# Patient Record
Sex: FEMALE | Race: WHITE | Employment: FULL TIME | URBAN - METROPOLITAN AREA
[De-identification: names, ages, dates, MRNs, and addresses within clinical notes are randomized per-mention and may not be internally consistent; named-entity substitution may affect disease eponyms.]

---

## 2017-01-06 ENCOUNTER — TELEPHONE (OUTPATIENT)
Dept: FAMILY MEDICINE CLINIC | Age: 22
End: 2017-01-06

## 2017-01-06 DIAGNOSIS — R11.2 NAUSEA AND VOMITING, INTRACTABILITY OF VOMITING NOT SPECIFIED, UNSPECIFIED VOMITING TYPE: ICD-10-CM

## 2017-01-06 RX ORDER — ONDANSETRON 4 MG/1
4 TABLET, ORALLY DISINTEGRATING ORAL
Qty: 25 TAB | Refills: 0 | Status: SHIPPED | OUTPATIENT
Start: 2017-01-06 | End: 2017-07-11

## 2017-01-06 NOTE — TELEPHONE ENCOUNTER
OK same dose Zofran- 4 mg  #25 up to every 8 hrs   Rx signed and sent to pharmacy per verbal order Dr Av Heath

## 2017-01-06 NOTE — TELEPHONE ENCOUNTER
Mana Borrero  [phone]    Pt is having nausea and vomiting  x a month. She has an appointment with a gastroenterologist on 01/10/17. She is requesting that Carrie Trinh call in something for nausea until she can be seen by the GI Dr.    Pharmacy confirmed.

## 2017-02-28 RX ORDER — DICYCLOMINE HYDROCHLORIDE 10 MG/1
CAPSULE ORAL
Qty: 120 CAP | Refills: 1 | Status: SHIPPED | OUTPATIENT
Start: 2017-02-28 | End: 2017-04-24 | Stop reason: SDUPTHER

## 2017-04-24 RX ORDER — DICYCLOMINE HYDROCHLORIDE 10 MG/1
CAPSULE ORAL
Qty: 120 CAP | Refills: 1 | Status: SHIPPED | OUTPATIENT
Start: 2017-04-24 | End: 2017-06-21 | Stop reason: SDUPTHER

## 2017-06-07 ENCOUNTER — OFFICE VISIT (OUTPATIENT)
Dept: FAMILY MEDICINE CLINIC | Age: 22
End: 2017-06-07

## 2017-06-07 VITALS
TEMPERATURE: 98 F | BODY MASS INDEX: 31.98 KG/M2 | SYSTOLIC BLOOD PRESSURE: 121 MMHG | WEIGHT: 173.8 LBS | HEART RATE: 88 BPM | RESPIRATION RATE: 18 BRPM | HEIGHT: 62 IN | OXYGEN SATURATION: 98 % | DIASTOLIC BLOOD PRESSURE: 84 MMHG

## 2017-06-07 DIAGNOSIS — R63.5 WEIGHT GAIN: ICD-10-CM

## 2017-06-07 DIAGNOSIS — Z00.00 ROUTINE GENERAL MEDICAL EXAMINATION AT A HEALTH CARE FACILITY: Primary | ICD-10-CM

## 2017-06-07 DIAGNOSIS — F31.9 BIPOLAR 1 DISORDER (HCC): ICD-10-CM

## 2017-06-07 DIAGNOSIS — D51.8 OTHER VITAMIN B12 DEFICIENCY ANEMIA: ICD-10-CM

## 2017-06-07 DIAGNOSIS — R35.89 POLYURIA: ICD-10-CM

## 2017-06-07 RX ORDER — HYDROXYZINE 25 MG/1
25 TABLET, FILM COATED ORAL
Qty: 30 TAB | Refills: 0
Start: 2017-06-07 | End: 2017-06-17

## 2017-06-07 NOTE — MR AVS SNAPSHOT
Visit Information Date & Time Provider Department Dept. Phone Encounter #  
 6/7/2017  2:00 PM Laith Fiore, [facility] [phone] [id number] Upcoming Health Maintenance Date Due  
 HPV AGE 9Y-34Y (1 of 3 - Female 3 Dose Series) 1/20/2004 INFLUENZA AGE 9 TO ADULT 8/1/2017 PAP AKA CERVICAL CYTOLOGY 9/2/2018 DTaP/Tdap/Td series (2 - Td) 6/7/2027 Allergies as of 6/7/2017  Review Complete On: 6/7/2017 By: Laith Fiore NP Severity Noted Reaction Type Reactions Coconut  03/10/2012    Rash Pcn [Penicillins]  03/10/2012    Hives Soy  09/09/2014    Hives Current Immunizations  Never Reviewed Name Date Influenza Vaccine 9/24/2016 Not reviewed this visit You Were Diagnosed With   
  
 Codes Comments Routine general medical examination at a health care facility    -  Primary ICD-10-CM: Z00.00 ICD-9-CM: V70.0 Polyuria     ICD-10-CM: R35.8 ICD-9-CM: 788.42 Weight gain     ICD-10-CM: R63.5 ICD-9-CM: 783.1 Bipolar 1 disorder (HCC)     ICD-10-CM: F31.9 ICD-9-CM: 296.7 Other vitamin B12 deficiency anemia     ICD-10-CM: D51.8 ICD-9-CM: 362. 1 Vitals BP Pulse Temp Resp Height(growth percentile) Weight(growth percentile) 121/84 (BP 1 Location: Left arm, BP Patient Position: Sitting) (!) 107 98 °F (36.7 °C) (Oral) 18 5' 2\" (1.575 m) 173 lb 12.8 oz (78.8 kg) LMP SpO2 BMI OB Status Smoking Status 06/04/2017 (Exact Date) 98% 31.79 kg/m2 Having regular periods Never Smoker BMI and BSA Data Body Mass Index Body Surface Area 31.79 kg/m 2 1.86 m 2 [facility]erred [facility]acy [facility] [facility] [phone] Your Updated Medication List  
  
   
This list is accurate as of: 6/7/17  2:32 PM.  Always use your most recent med list. ALLEGRA 180 mg tablet Generic drug:  fexofenadine Take  by mouth. CYMBALTA 60 mg capsule Generic drug:  DULoxetine Take 60 mg by mouth two (2) times a day. dicyclomine 10 mg capsule Commonly known as:  BENTYL TAKE 1 CAP BY MOUTH FOUR (4) TIMES DAILY. hydrOXYzine HCl 25 mg tablet Commonly known as:  ATARAX Take 1 Tab by mouth three (3) times daily as needed for Itching for up to 10 days. ibuprofen 100 mg tablet Take 100 mg by mouth every six (6) hours as needed. JUNEL FE 1/20 (28) 1 mg-20 mcg (21)/75 mg (7) Tab Generic drug:  norethindrone-ethinyl estradiol LATUDA 120 mg Tab tablet Generic drug:  lurasidone TAKE 1 TABLET BY MOUTH ONCE A DAY  
  
 lithium carbonate 300 mg capsule TAKE 3 CAPSULES BY MOUTH EVERY MORNING AND 3 CAPSULES EVERY NIGHT AT BEDTIME  
  
 ondansetron 4 mg disintegrating tablet Commonly known as:  ZOFRAN ODT Take 1 Tab by mouth every eight (8) hours as needed for Nausea. topiramate 50 mg tablet Commonly known as:  TOPAMAX TAKE 1 TABLET BY MOUTH IN THE MORNING AND TAKE 2 TABLETS BY MOUTH IN THE EVENING  
  
 VITAMIN D3 1,000 unit tablet Generic drug:  cholecalciferol Take  by mouth daily. ZANTAC 75 75 mg tablet Generic drug:  raNITIdine Take 75 mg by mouth daily. We Performed the Following CBC W/O DIFF [81468 CPT(R)] HEMOGLOBIN A1C WITH EAG [50720 CPT(R)] LIPID PANEL [03461 CPT(R)] LITHIUM [id number] CPT(R)] METABOLIC PANEL, COMPREHENSIVE [21643 CPT(R)] PROLACTIN [32865 CPT(R)] THYROID CASCADE PROFILE [TSU02539 Custom] VITAMIN B12 & FOLATE [22662 CPT(R)] VITAMIN D, 25 HYDROXY [id number] CPT(R)] Introducing [facility] & HEALTH SERVICES! [facility] introduces [employer] patient portal. Now you can access parts of your medical record, email your doctor's office, and request medication refills online. 1. In your internet browser, go to [email or web address] 2. Click on the First Time User? Click Here link in the Sign In box. You will see the New Member Sign Up page. 3. Enter your [employer] Access Code exactly as it appears below. You will not need to use this code after youve completed the sign-up process. If you do not sign up before the expiration date, you must request a new code. · [employer] Access Code: [email or web address] Expires: 9/5/2017  2:29 PM 
 
4. Enter the last four digits of your Social Security Number (xxxx) and Date of Birth (mm/dd/yyyy) as indicated and click Submit. You will be taken to the next sign-up page. 5. Create a [employer] ID. This will be your [employer] login ID and cannot be changed, so think of one that is secure and easy to remember. 6. Create a [employer] password. You can change your password at any time. 7. Enter your Password Reset Question and Answer. This can be used at a later time if you forget your password. 8. Enter your e-mail address. You will receive e-mail notification when new information is available in [address] 9. Click Sign Up. You can now view and download portions of your medical record. 10. Click the Download Summary menu link to download a portable copy of your medical information. If you have questions, please visit the Frequently Asked Questions section of the [employer] website. Remember, [employer] is NOT to be used for urgent needs. For medical emergencies, dial 911. Now available from your iPhone and Android! Please provide this summary of care documentation to your next provider. Your primary care clinician is listed as Mabel SCHNEIDER. If you have any questions after today's visit, please call [phone].

## 2017-06-07 NOTE — PROGRESS NOTES
Chief Complaint   Patient presents with    Complete Physical     annual exam, Patient has increased urination and drinking     1. Have you been to the ER, urgent care clinic since your last visit? Hospitalized since your last visit? No    2. Have you seen or consulted any other health care providers outside of the [address] [place] [address] since your last visit? Include any pap smears or colon screening. No     Learning Assessment 9/9/2015   PRIMARY LEARNER Patient   PRIMARY LANGUAGE ENGLISH   LEARNER PREFERENCE PRIMARY OTHER (COMMENT)   ANSWERED BY patient   RELATIONSHIP SELF       Abuse Screening Questionnaire 6/7/2017   Do you ever feel afraid of your partner? N   Are you in a relationship with someone who physically or mentally threatens you? N   Is it safe for you to go home?  Raulito Fritz

## 2017-06-07 NOTE — PROGRESS NOTES
Subjective:   23 y.o. female for Well Woman Check. Her gyne and breast care is done elsewhere by her Ob-Gyne physician. Patient Active Problem List    Diagnosis Date Noted    Iron deficiency anemia 03/31/2016    Bipolar 1 disorder ([address] 03/31/2016    Low serum vitamin D 12/07/2015     Current Outpatient Prescriptions   Medication Sig Dispense Refill    hydrOXYzine HCl (ATARAX) 25 mg tablet Take 1 Tab by mouth three (3) times daily as needed for Itching for up to 10 days. 30 Tab 0    ondansetron (ZOFRAN ODT) 4 mg disintegrating tablet Take 1 Tab by mouth every eight (8) hours as needed for Nausea. 25 Tab 0    lithium carbonate 300 mg capsule TAKE 3 CAPSULES BY MOUTH EVERY MORNING AND 3 CAPSULES EVERY NIGHT AT BEDTIME  0    topiramate (TOPAMAX) 50 mg tablet TAKE 1 TABLET BY MOUTH IN THE MORNING AND TAKE 2 TABLETS BY MOUTH IN THE EVENING  0    LATUDA 120 mg tab tablet TAKE 1 TABLET BY MOUTH ONCE A DAY  0    ranitidine (ZANTAC 75) 75 mg tablet Take 75 mg by mouth daily.  JUNEL FE 1/20, 28, 1 mg-20 mcg (21)/75 mg (7) per tablet       DULoxetine (CYMBALTA) 60 mg capsule Take 60 mg by mouth two (2) times a day.  fexofenadine (ALLEGRA) 180 mg tablet Take  by mouth.  ibuprofen 100 mg tablet Take 100 mg by mouth every six (6) hours as needed.  cholecalciferol, vitamin d3, (VITAMIN D) 1,000 unit tablet Take  by mouth daily.  dicyclomine (BENTYL) 10 mg capsule TAKE 1 CAP BY MOUTH FOUR (4) TIMES DAILY. 120 Cap 1     Allergies   Allergen Reactions    Coconut Rash    Pcn [Penicillins] Hives    Soy Hives     Past Medical History:   Diagnosis Date    Anemia     Anxiety     Arm fracture     Bipolar 1 disorder (HCC)     Depression     Fibromyalgia     IBS (irritable bowel syndrome)      History reviewed. No pertinent surgical history.   Family History   Problem Relation Age of Onset    Depression Mother     Depression Father     Heart Disease Maternal Grandmother     Depression Maternal Grandmother     Cancer Maternal Grandfather      brain cancer    Diabetes Maternal Grandfather     Hypertension Maternal Grandfather     Stroke Maternal Grandfather     Cancer Paternal Grandfather      Social History   Substance Use Topics    Smoking status: Never Smoker    Smokeless tobacco: Never Used    Alcohol use Yes      Comment: occ             ROS: Feeling generally well. No TIA's or unusual headaches, no dysphagia. No prolonged cough. No dyspnea or chest pain on exertion. No abdominal pain, change in bowel habits, black or bloody stools. No urinary tract symptoms. No new or unusual musculoskeletal symptoms. Specific concerns today: She reports being thirsty all the time and uses the bathroom a lot  Her psychiatrist requesting lab done also    Objective: The patient appears well, alert, oriented x 3, in no distress. Visit Vitals    /84 (BP 1 Location: Left arm, BP Patient Position: Sitting)    Pulse 88    Temp 98 °F (36.7 °C) (Oral)    Resp 18    Ht 5' 2\" (1.575 m)    Wt 173 lb 12.8 oz (78.8 kg)    LMP 06/04/2017 (Exact Date)    SpO2 98%    BMI 31.79 kg/m2     ENT normal.  Neck supple. No adenopathy or thyromegaly. MALU. Lungs are clear, good air entry, no wheezes, rhonchi or rales. S1 and S2 normal, no murmurs, regular rate and rhythm. Abdomen soft without tenderness, guarding, mass or organomegaly. Extremities show no edema, normal peripheral pulses. Neurological is normal, no focal findings. Breast and Pelvic exams are deferred. Assessment/Plan:   Well Woman  lose weight, increase physical activity, follow low fat diet, routine labs ordered    ICD-10-CM ICD-9-CM    1. Routine general medical examination at a health care facility Z00.00 V70.0 CBC W/O DIFF      LIPID PANEL      METABOLIC PANEL, COMPREHENSIVE      VITAMIN D, 25 HYDROXY      THYROID CASCADE PROFILE      CANCELED: TSH 3RD GENERATION   2.  Polyuria R35.8 788.42 HEMOGLOBIN A1C WITH EAG 3. Weight gain R63.5 783.1 THYROID CASCADE PROFILE   4.  Bipolar 1 disorder (HCC) F31.9 296.7 PROLACTIN      LITHIUM   5. Other vitamin B12 deficiency anemia D51.8 281.1 VITAMIN B12 & FOLATE   await labs  Pt was given an after visit summary which includes diagnosis, current medicines and vital and voiced understanding of treatment plan

## 2017-06-10 LAB
25(OH)D3+25(OH)D2 SERPL-MCNC: 15.8 NG/ML (ref 30–100)
ALBUMIN SERPL-MCNC: 4.4 G/DL (ref 3.5–5.5)
ALBUMIN/GLOB SERPL: 1.6 {RATIO} (ref 1.2–2.2)
ALP SERPL-CCNC: 81 IU/L (ref 39–117)
ALT SERPL-CCNC: 15 IU/L (ref 0–32)
AST SERPL-CCNC: 16 IU/L (ref 0–40)
BILIRUB SERPL-MCNC: 0.2 MG/DL (ref 0–1.2)
BUN SERPL-MCNC: 11 MG/DL (ref 6–20)
BUN/CREAT SERPL: 15 (ref 9–23)
CALCIUM SERPL-MCNC: 9.4 MG/DL (ref 8.7–10.2)
CHLORIDE SERPL-SCNC: 105 MMOL/L (ref 96–106)
CHOLEST SERPL-MCNC: 202 MG/DL (ref 100–199)
CO2 SERPL-SCNC: 18 MMOL/L (ref 18–29)
CREAT SERPL-MCNC: 0.75 MG/DL (ref 0.57–1)
ERYTHROCYTE [DISTWIDTH] IN BLOOD BY AUTOMATED COUNT: 13.8 % (ref 12.3–15.4)
EST. AVERAGE GLUCOSE BLD GHB EST-MCNC: 88 MG/DL
FOLATE SERPL-MCNC: 5.8 NG/ML
GLOBULIN SER CALC-MCNC: 2.7 G/DL (ref 1.5–4.5)
GLUCOSE SERPL-MCNC: 92 MG/DL (ref 65–99)
HBA1C MFR BLD: 4.7 % (ref 4.8–5.6)
HCT VFR BLD AUTO: 40.1 % (ref 34–46.6)
HDLC SERPL-MCNC: 51 MG/DL
HGB BLD-MCNC: 12.7 G/DL (ref 11.1–15.9)
INTERPRETATION, 910389: NORMAL
INTERPRETIVE COMMENT, 330017: NORMAL
LDLC SERPL CALC-MCNC: 125 MG/DL (ref 0–99)
LITHIUM SERPL-SCNC: 0.7 MMOL/L (ref 0.6–1.2)
MCH RBC QN AUTO: 27 PG (ref 26.6–33)
MCHC RBC AUTO-ENTMCNC: 31.7 G/DL (ref 31.5–35.7)
MCV RBC AUTO: 85 FL (ref 79–97)
PLATELET # BLD AUTO: 357 X10E3/UL (ref 150–379)
POTASSIUM SERPL-SCNC: 4.3 MMOL/L (ref 3.5–5.2)
PROLACTIN SERPL-MCNC: 30.5 NG/ML (ref 4.8–23.3)
PROT SERPL-MCNC: 7.1 G/DL (ref 6–8.5)
RBC # BLD AUTO: 4.7 X10E6/UL (ref 3.77–5.28)
SODIUM SERPL-SCNC: 140 MMOL/L (ref 134–144)
T4 FREE SERPL-MCNC: 0.93 NG/DL (ref 0.82–1.77)
THYROPEROXIDASE AB SERPL-ACNC: 7 IU/ML (ref 0–34)
TRIGL SERPL-MCNC: 129 MG/DL (ref 0–149)
TSH SERPL DL<=0.005 MIU/L-ACNC: 5.43 UIU/ML (ref 0.45–4.5)
VIT B12 SERPL-MCNC: 328 PG/ML (ref 211–946)
VLDLC SERPL CALC-MCNC: 26 MG/DL (ref 5–40)
WBC # BLD AUTO: 9.3 X10E3/UL (ref 3.4–10.8)

## 2017-06-21 RX ORDER — DICYCLOMINE HYDROCHLORIDE 10 MG/1
CAPSULE ORAL
Qty: 120 CAP | Refills: 1 | Status: SHIPPED | OUTPATIENT
Start: 2017-06-21 | End: 2017-08-22 | Stop reason: SDUPTHER

## 2017-07-11 ENCOUNTER — OFFICE VISIT (OUTPATIENT)
Dept: FAMILY MEDICINE CLINIC | Age: 22
End: 2017-07-11

## 2017-07-11 VITALS
OXYGEN SATURATION: 98 % | HEIGHT: 62 IN | TEMPERATURE: 98.4 F | RESPIRATION RATE: 18 BRPM | SYSTOLIC BLOOD PRESSURE: 132 MMHG | HEART RATE: 100 BPM | DIASTOLIC BLOOD PRESSURE: 90 MMHG

## 2017-07-11 DIAGNOSIS — E03.9 ACQUIRED HYPOTHYROIDISM: Primary | ICD-10-CM

## 2017-07-11 DIAGNOSIS — R11.2 NAUSEA AND VOMITING, INTRACTABILITY OF VOMITING NOT SPECIFIED, UNSPECIFIED VOMITING TYPE: ICD-10-CM

## 2017-07-11 RX ORDER — ONDANSETRON 8 MG/1
8 TABLET, ORALLY DISINTEGRATING ORAL
Qty: 25 TAB | Refills: 0 | Status: SHIPPED | OUTPATIENT
Start: 2017-07-11 | End: 2018-05-03 | Stop reason: SDUPTHER

## 2017-07-11 NOTE — PROGRESS NOTES
1. Have you been to the ER, urgent care clinic since your last visit? Hospitalized since your last visit? No    2. Have you seen or consulted any other health care providers outside of the [address] [place] [address] since your last visit? Include any pap smears or colon screening. No     Chief Complaint   Patient presents with    Vomiting     patient vomit on sunday and today     Learning assessment complete    Abuse Screening Questionnaire 7/11/2017   Do you ever feel afraid of your partner? N   Are you in a relationship with someone who physically or mentally threatens you? N   Is it safe for you to go home?  Jim Herrera

## 2017-07-11 NOTE — PROGRESS NOTES
HISTORY OF PRESENT ILLNESS  Luna Benavides is a 23 y.o. female. HPI: Pt report nausea and vomiting for three days. She vomited X 3. Usually taking her psych medications causes nausea especially if she takes them on an empty tomach  Denies abdominal pain, diarrhea or constipation. She has been drinking ginger ale and eating bland diet  Her tsh was elevated last time, will repeat her TSH and free T4. Past Medical History:   Diagnosis Date    Anemia     Anxiety     Arm fracture     Bipolar 1 disorder (HCC)     Depression     Fibromyalgia     IBS (irritable bowel syndrome)      Allergies   Allergen Reactions    Coconut Rash    Pcn [Penicillins] Hives    Soy Hives     Current Outpatient Prescriptions:     ondansetron (ZOFRAN ODT) 8 mg disintegrating tablet, Take 1 Tab by mouth every eight (8) hours as needed for Nausea., Disp: 25 Tab, Rfl: 0    dicyclomine (BENTYL) 10 mg capsule, TAKE 1 CAP BY MOUTH FOUR (4) TIMES DAILY. , Disp: 120 Cap, Rfl: 1    lithium carbonate 300 mg capsule, TAKE 3 CAPSULES BY MOUTH EVERY MORNING AND 3 CAPSULES EVERY NIGHT AT BEDTIME, Disp: , Rfl: 0    topiramate (TOPAMAX) 50 mg tablet, TAKE 1 TABLET BY MOUTH IN THE MORNING AND TAKE 2 TABLETS BY MOUTH IN THE EVENING, Disp: , Rfl: 0    LATUDA 120 mg tab tablet, TAKE 1 TABLET BY MOUTH ONCE A DAY, Disp: , Rfl: 0    JUNEL FE 1/20, 28, 1 mg-20 mcg (21)/75 mg (7) per tablet, , Disp: , Rfl:     DULoxetine (CYMBALTA) 60 mg capsule, Take 60 mg by mouth two (2) times a day., Disp: , Rfl:     fexofenadine (ALLEGRA) 180 mg tablet, Take  by mouth., Disp: , Rfl:     ibuprofen 100 mg tablet, Take 100 mg by mouth every six (6) hours as needed. , Disp: , Rfl:     cholecalciferol, vitamin d3, (VITAMIN D) 1,000 unit tablet, Take  by mouth daily. , Disp: , Rfl:     ranitidine (ZANTAC 75) 75 mg tablet, Take 75 mg by mouth daily. , Disp: , Rfl:   Review of Systems   Constitutional: Negative. Respiratory: Negative.     Cardiovascular: Negative. Gastrointestinal: Positive for nausea and vomiting. Negative for abdominal pain, blood in stool, constipation, diarrhea, heartburn and melena. Blood pressure 132/90, pulse 100, temperature 98.4 °F (36.9 °C), temperature source Oral, resp. rate 18, height 5' 2\" (1.575 m), last menstrual period 06/21/2017, SpO2 98 %. Physical Exam   Constitutional: No distress. HENT:   Mouth/Throat: Oropharynx is clear and moist.   Neck: Normal range of motion. Neck supple. Cardiovascular: Normal rate and regular rhythm. No murmur heard. Pulmonary/Chest: Effort normal and breath sounds normal.   Abdominal: Soft. Bowel sounds are normal.   Nursing note and vitals reviewed. ASSESSMENT and PLAN    ICD-10-CM ICD-9-CM    1.  Acquired hypothyroidism E03.9 244.9 TSH 3RD GENERATION      T4, FREE   2. Nausea and vomiting, intractability of vomiting not specified, unspecified vomiting type R11.2 787.01 ondansetron (ZOFRAN ODT) 8 mg disintegrating tablet   await labs  Advised take medication after eating  Drink carolin tea with honey, eat peanut butter and jelly  Follow up if not improved  Pt was given an after visit summary which includes diagnosis, current medicines and vital and voiced understanding of treatment plan

## 2017-07-11 NOTE — MR AVS SNAPSHOT
Visit Information Date & Time Provider Department Dept. Phone Encounter #  
 7/11/2017  3:45 PM Gonzalo Vance, [address] [facility] [phone] [id number] Upcoming Health Maintenance Date Due  
 HPV AGE 9Y-34Y (1 of 3 - Female 3 Dose Series) 1/20/2004 INFLUENZA AGE 9 TO ADULT 8/1/2017 PAP AKA CERVICAL CYTOLOGY 9/2/2018 DTaP/Tdap/Td series (2 - Td) 6/7/2027 Allergies as of 7/11/2017  Review Complete On: 7/11/2017 By: Gonzalo Vance NP Severity Noted Reaction Type Reactions Coconut  03/10/2012    Rash Pcn [Penicillins]  03/10/2012    Hives Soy  09/09/2014    Hives Current Immunizations  Never Reviewed Name Date Influenza Vaccine 9/24/2016 Not reviewed this visit You Were Diagnosed With   
  
 Codes Comments Acquired hypothyroidism    -  Primary ICD-10-CM: E03.9 ICD-9-CM: 244.9 Nausea and vomiting, intractability of vomiting not specified, unspecified vomiting type     ICD-10-CM: R11.2 ICD-9-CM: 787.01 Vitals BP Pulse Temp Resp Height(growth percentile) LMP  
 132/90 (BP 1 Location: Left arm, BP Patient Position: Sitting) (!) 106 98.4 °F (36.9 °C) (Oral) 18 5' 2\" (1.575 m) 06/21/2017 (Exact Date) SpO2 OB Status Smoking Status 98% Having regular periods Never Smoker Preferred [facility] [address] [place] [address] Rd [phone] Your Updated Medication List  
  
   
This list is accurate as of: 7/11/17  4:20 PM.  Always use your most recent med list. ALLEGRA 180 mg tablet Generic drug:  fexofenadine Take  by mouth. CYMBALTA 60 mg capsule Generic drug:  DULoxetine Take 60 mg by mouth two (2) times a day. dicyclomine 10 mg capsule Commonly known as:  BENTYL TAKE 1 CAP BY MOUTH FOUR (4) TIMES DAILY. ibuprofen 100 mg tablet Take 100 mg by mouth every six (6) hours as needed. JUNEL FE 1/20 (28) 1 mg-20 mcg (21)/75 mg (7) Tab Generic drug:  norethindrone-ethinyl estradiol LATUDA 120 mg Tab tablet Generic drug:  lurasidone TAKE 1 TABLET BY MOUTH ONCE A DAY  
  
 lithium carbonate 300 mg capsule TAKE 3 CAPSULES BY MOUTH EVERY MORNING AND 3 CAPSULES EVERY NIGHT AT BEDTIME  
  
 ondansetron 8 mg disintegrating tablet Commonly known as:  ZOFRAN ODT Take 1 Tab by mouth every eight (8) hours as needed for Nausea. topiramate 50 mg tablet Commonly known as:  TOPAMAX TAKE 1 TABLET BY MOUTH IN THE MORNING AND TAKE 2 TABLETS BY MOUTH IN THE EVENING  
  
 VITAMIN D3 1,000 unit tablet Generic drug:  cholecalciferol Take  by mouth daily. ZANTAC 75 75 mg tablet Generic drug:  raNITIdine Take 75 mg by mouth daily. Prescriptions Sent to Pharmacy Refills  
 ondansetron (ZOFRAN ODT) 8 mg disintegrating tablet 0 Sig: Take 1 Tab by mouth every eight (8) hours as needed for Nausea. Class: Normal  
 Pharmacy: [address] [place] Ph #: [phone] Route: Oral  
  
We Performed the Following T4, FREE [id number] CPT(R)] TSH 3RD GENERATION [97609 CPT(R)] Introducing [facility] & HEALTH SERVICES! Geoff Beth introduces [employer] patient portal. Now you can access parts of your medical record, email your doctor's office, and request medication refills online. 1. In your internet browser, go to [email or web address] 2. Click on the First Time User? Click Here link in the Sign In box. You will see the New Member Sign Up page. 3. Enter your [employer] Access Code exactly as it appears below. You will not need to use this code after youve completed the sign-up process. If you do not sign up before the expiration date, you must request a new code. · [employer] Access Code: [email or web address] Expires: 9/5/2017  2:29 PM 
 
 4. Enter the last four digits of your Social Security Number (xxxx) and Date of Birth (mm/dd/yyyy) as indicated and click Submit. You will be taken to the next sign-up page. 5. Create a [employer] ID. This will be your [employer] login ID and cannot be changed, so think of one that is secure and easy to remember. 6. Create a [employer] password. You can change your password at any time. 7. Enter your Password Reset Question and Answer. This can be used at a later time if you forget your password. 8. Enter your e-mail address. You will receive e-mail notification when new information is available in [address] 9. Click Sign Up. You can now view and download portions of your medical record. 10. Click the Download Summary menu link to download a portable copy of your medical information. If you have questions, please visit the Frequently Asked Questions section of the [employer] website. Remember, [employer] is NOT to be used for urgent needs. For medical emergencies, dial 911. Now available from your iPhone and Android! Please provide this summary of care documentation to your next provider. Your primary care clinician is listed as Rebecca SCHNEIDER. If you have any questions after today's visit, please call [phone].

## 2017-07-12 ENCOUNTER — TELEPHONE (OUTPATIENT)
Dept: FAMILY MEDICINE CLINIC | Age: 22
End: 2017-07-12

## 2017-07-12 NOTE — TELEPHONE ENCOUNTER
Patient report she can't hold anything down, and is worried that she is dehydrated   Advised to go to ER

## 2017-07-12 NOTE — TELEPHONE ENCOUNTER
Patient is calling, patient states that she would like  acall back from the nurse or NP [address] in regards to the symptoms she is currently having, patient states that she believes that she may be dehydrated. Patient states that she peed one time today and it was very dark yellow, she states that it was maybe one drop of urine, no stream. Patient states that when she gets up she is extremely dizzy and nauseas, she states that she has kept down the food recommended by DENI Armendariz at her appointment yesterday, and she is still having sweats and chills. Patient is requesting a call back from the nurse on what she should do about this.     Best call back # for [id number]:[phone]

## 2017-08-22 RX ORDER — DICYCLOMINE HYDROCHLORIDE 10 MG/1
CAPSULE ORAL
Qty: 120 CAP | Refills: 1 | Status: SHIPPED | OUTPATIENT
Start: 2017-08-22 | End: 2017-10-20 | Stop reason: SDUPTHER

## 2017-10-21 RX ORDER — DICYCLOMINE HYDROCHLORIDE 10 MG/1
CAPSULE ORAL
Qty: 120 CAP | Refills: 1 | Status: SHIPPED | OUTPATIENT
Start: 2017-10-21 | End: 2017-12-14 | Stop reason: SDUPTHER

## 2017-11-14 ENCOUNTER — OFFICE VISIT (OUTPATIENT)
Dept: FAMILY MEDICINE CLINIC | Age: 22
End: 2017-11-14

## 2017-11-14 VITALS
OXYGEN SATURATION: 100 % | TEMPERATURE: 97.8 F | RESPIRATION RATE: 16 BRPM | HEIGHT: 62 IN | SYSTOLIC BLOOD PRESSURE: 124 MMHG | WEIGHT: 174 LBS | HEART RATE: 74 BPM | BODY MASS INDEX: 32.02 KG/M2 | DIASTOLIC BLOOD PRESSURE: 88 MMHG

## 2017-11-14 DIAGNOSIS — J06.9 VIRAL UPPER RESPIRATORY TRACT INFECTION: Primary | ICD-10-CM

## 2017-11-14 RX ORDER — AZITHROMYCIN 250 MG/1
TABLET, FILM COATED ORAL
Qty: 6 TAB | Refills: 0 | Status: SHIPPED | OUTPATIENT
Start: 2017-11-14 | End: 2017-11-19

## 2017-11-14 NOTE — PROGRESS NOTES
Pt presents to office today for a cold symptoms that she has been having for 5 days. Chief Complaint   Patient presents with    Cough     Greenish phlegm for 5 days. , Dayquil and Nyquil.  Nasal Congestion     For 5 days.  Fatigue     For 5 days. 1. Have you been to the ER, urgent care clinic since your last visit? Hospitalized since your last visit? No    2. Have you seen or consulted any other health care providers outside of the [address] [place] [address] since your last visit? Include any pap smears or colon screening.  No

## 2017-11-14 NOTE — MR AVS SNAPSHOT
Visit Information Date & Time Provider Department Dept. Phone Encounter #  
 11/14/2017  3:30 PM Pablo Bowers, [address] [phone] [id number] Follow-up Instructions Return if symptoms worsen or fail to improve. Upcoming Health Maintenance Date Due  
 HPV AGE 9Y-34Y (1 of 3 - Female 3 Dose Series) 1/20/2004 PAP AKA CERVICAL CYTOLOGY 9/2/2018 DTaP/Tdap/Td series (2 - Td) 6/7/2027 Allergies as of 11/14/2017  Review Complete On: 11/14/2017 By: Pablo Bowers NP Severity Noted Reaction Type Reactions Coconut  03/10/2012    Rash Pcn [Penicillins]  03/10/2012    Hives Soy  09/09/2014    Hives Current Immunizations  Never Reviewed Name Date Influenza Vaccine 9/6/2017, 9/24/2016 Not reviewed this visit You Were Diagnosed With   
  
 Codes Comments Viral upper respiratory tract infection    -  Primary ICD-10-CM: J06.9, B97.89 ICD-9-CM: 465.9 Vitals BP Pulse Temp Resp Height(growth percentile) Weight(growth percentile) 124/88 74 97.8 °F (36.6 °C) (Oral) 16 5' 2\" (1.575 m) 174 lb (78.9 kg) LMP SpO2 BMI OB Status Smoking Status 08/22/2017 100% 31.83 kg/m2 Having regular periods Never Smoker Vitals History BMI and BSA Data Body Mass Index Body Surface Area  
 31.83 kg/m 2 1.86 m 2 Preferred Pharmacy Pharmacy Name Phone [address] [phone] Your Updated Medication List  
  
   
This list is accurate as of: 11/14/17  3:58 PM.  Always use your most recent med list. ALLEGRA 180 mg tablet Generic drug:  fexofenadine Take  by mouth. azithromycin 250 mg tablet Commonly known as:  Alfredo Rodriguez Take 2 tablets today, then take 1 tablet daily CYMBALTA 60 mg capsule Generic drug:  DULoxetine Take 60 mg by mouth two (2) times a day. dicyclomine 10 mg capsule Commonly known as:  BENTYL TAKE 1 CAP BY MOUTH FOUR (4) TIMES DAILY. ibuprofen 100 mg tablet Take 100 mg by mouth every six (6) hours as needed. JUNEL FE 1/20 (28) 1 mg-20 mcg (21)/75 mg (7) Tab Generic drug:  norethindrone-ethinyl estradiol LATUDA 120 mg Tab tablet Generic drug:  lurasidone TAKE 1 TABLET BY MOUTH ONCE A DAY  
  
 lithium carbonate 300 mg capsule TAKE 3 CAPSULES BY MOUTH EVERY MORNING AND 3 CAPSULES EVERY NIGHT AT BEDTIME  
  
 ondansetron 8 mg disintegrating tablet Commonly known as:  ZOFRAN ODT Take 1 Tab by mouth every eight (8) hours as needed for Nausea. topiramate 50 mg tablet Commonly known as:  TOPAMAX TAKE 1 TABLET BY MOUTH IN THE MORNING AND TAKE 2 TABLETS BY MOUTH IN THE EVENING  
  
 VITAMIN D3 1,000 unit tablet Generic drug:  cholecalciferol Take  by mouth daily. ZANTAC 75 75 mg tablet Generic drug:  raNITIdine Take 75 mg by mouth daily. Prescriptions Printed Refills  
 azithromycin (ZITHROMAX) 250 mg tablet 0 Sig: Take 2 tablets today, then take 1 tablet daily Class: Print Follow-up Instructions Return if symptoms worsen or fail to improve. Patient Instructions Upper Respiratory Infection (Cold): Care Instructions Your Care Instructions An upper respiratory infection, or URI, is an infection of the nose, sinuses, or throat. URIs are spread by coughs, sneezes, and direct contact. The common cold is the most frequent kind of URI. The flu and sinus infections are other kinds of URIs. Almost all URIs are caused by viruses. Antibiotics won't cure them. But you can treat most infections with home care. This may include drinking lots of fluids and taking over-the-counter pain medicine. You will probably feel better in 4 to 10 days. The doctor has checked you carefully, but problems can develop later. If you notice any problems or new symptoms, get medical treatment right away. Follow-up care is a key part of your treatment and safety. Be sure to make and go to all appointments, and call your doctor if you are having problems. It's also a good idea to know your test results and keep a list of the medicines you take. How can you care for yourself at home? · To prevent dehydration, drink plenty of fluids, enough so that your urine is light yellow or clear like water. Choose water and other caffeine-free clear liquids until you feel better. If you have kidney, heart, or liver disease and have to limit fluids, talk with your doctor before you increase the amount of fluids you drink. · Take an over-the-counter pain medicine, such as acetaminophen (Tylenol), ibuprofen (Advil, Motrin), or naproxen (Aleve). Read and follow all instructions on the label. · Before you use cough and cold medicines, check the label. These medicines may not be safe for young children or for people with certain health problems. · Be careful when taking over-the-counter cold or flu medicines and Tylenol at the same time. Many of these medicines have acetaminophen, which is Tylenol. Read the labels to make sure that you are not taking more than the recommended dose. Too much acetaminophen (Tylenol) can be harmful. · Get plenty of rest. 
· Do not smoke or allow others to smoke around you. If you need help quitting, talk to your doctor about stop-smoking programs and medicines. These can increase your chances of quitting for good. When should you call for help? Call 911 anytime you think you may need emergency care. For example, call if: 
? · You have severe trouble breathing. ?Call your doctor now or seek immediate medical care if: 
? · You seem to be getting much sicker. ? · You have new or worse trouble breathing. ? · You have a new or higher fever. ? · You have a new rash. ? Watch closely for changes in your health, and be sure to contact your doctor if: ? · You have a new symptom, such as a sore throat, an earache, or sinus pain. ? · You cough more deeply or more often, especially if you notice more mucus or a change in the color of your mucus. ? · You do not get better as expected. Where can you learn more? Go to http://thomas-bryce.info/. Enter J465 in the search box to learn more about \"Upper Respiratory Infection (Cold): Care Instructions. \" Current as of: May 12, 2017 Content Version: 11.4 © 6275-8664 [employer]. Care instructions adapted under license by [employer] (which disclaims liability or warranty for this information). If you have questions about a medical condition or this instruction, always ask your healthcare professional. [address] any warranty or liability for your use of this information. Introducing [facility] HE[facility]! Dorothy Colon introduces Measy patient portal. Now you can access parts of your medical record, email your doctor's office, and request medication refills online. 1. In your internet browser, go to [email or web address] 2. Click on the First Time User? Click Here link in the Sign In box. You will see the New Member Sign Up page. 3. Enter your Measy Access Code exactly as it appears below. You will not need to use this code after youve completed the sign-up process. If you do not sign up before the expiration date, you must request a new code. · Measy Access Code: [email or web address] Expires: 2/12/2018  3:27 PM 
 
4. Enter the last four digits of your Social Security Number (xxxx) and Date of Birth (mm/dd/yyyy) as indicated and click Submit. You will be taken to the next sign-up page. 5. Create a MySiteAppt ID. This will be your Measy login ID and cannot be changed, so think of one that is secure and easy to remember. 6. Create a MySiteAppt password. You can change your password at any time. 7. Enter your Password Reset Question and Answer. This can be used at a later time if you forget your password. 8. Enter your e-mail address. You will receive e-mail notification when new information is available in [address] 9. Click Sign Up. You can now view and download portions of your medical record. 10. Click the Download Summary menu link to download a portable copy of your medical information. If you have questions, please visit the Frequently Asked Questions section of the [employer] website. Remember, [employer] is NOT to be used for urgent needs. For medical emergencies, dial 911. Now available from your iPhone and Android! Please provide this summary of care documentation to your next provider. Your primary care clinician is listed as Dave SCHNEIDER. If you have any questions after today's visit, please call [phone].

## 2017-11-14 NOTE — PATIENT INSTRUCTIONS
Upper Respiratory Infection (Cold): Care Instructions  Your Care Instructions    An upper respiratory infection, or URI, is an infection of the nose, sinuses, or throat. URIs are spread by coughs, sneezes, and direct contact. The common cold is the most frequent kind of URI. The flu and sinus infections are other kinds of URIs. Almost all URIs are caused by viruses. Antibiotics won't cure them. But you can treat most infections with home care. This may include drinking lots of fluids and taking over-the-counter pain medicine. You will probably feel better in 4 to 10 days. The doctor has checked you carefully, but problems can develop later. If you notice any problems or new symptoms, get medical treatment right away. Follow-up care is a key part of your treatment and safety. Be sure to make and go to all appointments, and call your doctor if you are having problems. It's also a good idea to know your test results and keep a list of the medicines you take. How can you care for yourself at home? · To prevent dehydration, drink plenty of fluids, enough so that your urine is light yellow or clear like water. Choose water and other caffeine-free clear liquids until you feel better. If you have kidney, heart, or liver disease and have to limit fluids, talk with your doctor before you increase the amount of fluids you drink. · Take an over-the-counter pain medicine, such as acetaminophen (Tylenol), ibuprofen (Advil, Motrin), or naproxen (Aleve). Read and follow all instructions on the label. · Before you use cough and cold medicines, check the label. These medicines may not be safe for young children or for people with certain health problems. · Be careful when taking over-the-counter cold or flu medicines and Tylenol at the same time. Many of these medicines have acetaminophen, which is Tylenol. Read the labels to make sure that you are not taking more than the recommended dose.  Too much acetaminophen (Tylenol) can be harmful. · Get plenty of rest.  · Do not smoke or allow others to smoke around you. If you need help quitting, talk to your doctor about stop-smoking programs and medicines. These can increase your chances of quitting for good. When should you call for help? Call 911 anytime you think you may need emergency care. For example, call if:  ? · You have severe trouble breathing. ?Call your doctor now or seek immediate medical care if:  ? · You seem to be getting much sicker. ? · You have new or worse trouble breathing. ? · You have a new or higher fever. ? · You have a new rash. ? Watch closely for changes in your health, and be sure to contact your doctor if:  ? · You have a new symptom, such as a sore throat, an earache, or sinus pain. ? · You cough more deeply or more often, especially if you notice more mucus or a change in the color of your mucus. ? · You do not get better as expected. Where can you learn more? Go to http://thomas-bryce.info/. Enter T324 in the search box to learn more about \"Upper Respiratory Infection (Cold): Care Instructions. \"  Current as of: May 12, 2017  Content Version: 11.4  © 5085-3599 Healthwise[employer] Incorporated. Care instructions adapted under license by [employer] (which disclaims liability or warranty for this information). If you have questions about a medical condition or this instruction, always ask your healthcare professional. [place] [address] any warranty or liability for your use of this information.

## 2017-11-14 NOTE — PROGRESS NOTES
Michoacano Cross is a 23 y.o. female who was seen in clinic today (11/14/2017). Subjective:  Upper Respiratory Infection  Patient complains of symptoms of a URI. Symptoms include congestion, sore throat and cough. Onset of symptoms was 5 days ago, gradually improving since that time. She also c/o achiness, congestion, cough described as productive of clear sputum, purulent nasal discharge, sinus pressure and sore throat for the past 5 days . She is drinking plenty of fluids. Evaluation to date: none. Treatment to date: OTC products. Prior to Admission medications    Medication Sig Start Date End Date Taking? Authorizing Provider   azithromycin (ZITHROMAX) 250 mg tablet Take 2 tablets today, then take 1 tablet daily 11/14/17 11/19/17 Yes Khadijah Burleson NP   dicyclomine (BENTYL) 10 mg capsule TAKE 1 CAP BY MOUTH FOUR (4) TIMES DAILY. 10/21/17  Yes Casie Armendariz NP   ondansetron (ZOFRAN ODT) 8 mg disintegrating tablet Take 1 Tab by mouth every eight (8) hours as needed for Nausea. 7/11/17  Yes Casie Armendariz NP   lithium carbonate 300 mg capsule TAKE 3 CAPSULES BY MOUTH EVERY MORNING AND 3 CAPSULES EVERY NIGHT AT BEDTIME 8/17/16  Yes Historical Provider   topiramate (TOPAMAX) 50 mg tablet TAKE 1 TABLET BY MOUTH IN THE MORNING AND TAKE 2 TABLETS BY MOUTH IN THE EVENING 8/10/16  Yes Historical Provider   LATUDA 120 mg tab tablet TAKE 1 TABLET BY MOUTH ONCE A DAY 7/2/16  Yes Historical Provider   ranitidine (ZANTAC 75) 75 mg tablet Take 75 mg by mouth daily. Yes Historical Provider   JUNEL FE 1/20, 28, 1 mg-20 mcg (21)/75 mg (7) per tablet  8/25/15  Yes Historical Provider   DULoxetine (CYMBALTA) 60 mg capsule Take 60 mg by mouth two (2) times a day. Yes Historical Provider   ibuprofen 100 mg tablet Take 100 mg by mouth every six (6) hours as needed. Yes Stacey Baez MD   cholecalciferol, vitamin d3, (VITAMIN D) 1,000 unit tablet Take  by mouth daily.      Yes Stacey Baez MD   fexofenadine (ALLEGRA) 180 mg tablet Take  by mouth. Historical Provider          Allergies   Allergen Reactions    Coconut Rash    Pcn [Penicillins] Hives    Soy Hives           ROS  See HPI    Objective:   Physical Exam   Constitutional: She is oriented to person, place, and time. She appears well-developed and well-nourished. No distress. HENT:   Right Ear: Tympanic membrane and ear canal normal.   Left Ear: Tympanic membrane and ear canal normal.   Nose: Mucosal edema present. Right sinus exhibits no maxillary sinus tenderness and no frontal sinus tenderness. Left sinus exhibits no maxillary sinus tenderness and no frontal sinus tenderness. Mouth/Throat: Oropharynx is clear and moist.   Cardiovascular: Normal rate, regular rhythm and normal heart sounds. No murmur heard. Pulmonary/Chest: Effort normal and breath sounds normal. She has no decreased breath sounds. She has no wheezes. She has no rhonchi. Lymphadenopathy:     She has no cervical adenopathy. Neurological: She is alert and oriented to person, place, and time. Psychiatric: She has a normal mood and affect. Her behavior is normal.   Nursing note and vitals reviewed. Visit Vitals    /88    Pulse 74    Temp 97.8 °F (36.6 °C) (Oral)    Resp 16    Ht 5' 2\" (1.575 m)    Wt 174 lb (78.9 kg)    LMP 08/22/2017    SpO2 100%    BMI 31.83 kg/m2       Assessment & Plan:  Diagnoses and all orders for this visit:    1. Viral upper respiratory tract infection  Discussed that symptoms were viral and recommended treating symptoms. Increase fluids and rest. Reviewed OTC products that patient could take. -    Delay fill azithromycin (ZITHROMAX) 250 mg tablet; Take 2 tablets today, then take 1 tablet daily      I have discussed the diagnosis with the patient and the intended plan as seen in the above orders. The patient has received an after-visit summary along with patient information handout.   I have discussed medication side effects and warnings with the patient as well. Follow-up Disposition:  Return if symptoms worsen or fail to improve.         Khadijah Burleson NP

## 2017-12-14 RX ORDER — DICYCLOMINE HYDROCHLORIDE 10 MG/1
CAPSULE ORAL
Qty: 120 CAP | Refills: 1 | Status: SHIPPED | OUTPATIENT
Start: 2017-12-14 | End: 2018-02-09 | Stop reason: SDUPTHER

## 2018-01-26 ENCOUNTER — OFFICE VISIT (OUTPATIENT)
Dept: FAMILY MEDICINE CLINIC | Age: 23
End: 2018-01-26

## 2018-01-26 VITALS
TEMPERATURE: 97.2 F | WEIGHT: 179 LBS | OXYGEN SATURATION: 99 % | BODY MASS INDEX: 32.94 KG/M2 | HEIGHT: 62 IN | SYSTOLIC BLOOD PRESSURE: 127 MMHG | HEART RATE: 89 BPM | RESPIRATION RATE: 16 BRPM | DIASTOLIC BLOOD PRESSURE: 81 MMHG

## 2018-01-26 DIAGNOSIS — R79.89 ABNORMAL TSH: ICD-10-CM

## 2018-01-26 DIAGNOSIS — R79.89 LOW SERUM VITAMIN D: ICD-10-CM

## 2018-01-26 DIAGNOSIS — R53.83 FATIGUE, UNSPECIFIED TYPE: Primary | ICD-10-CM

## 2018-01-26 DIAGNOSIS — F31.9 BIPOLAR 1 DISORDER (HCC): ICD-10-CM

## 2018-01-26 DIAGNOSIS — E66.9 OBESITY (BMI 30-39.9): ICD-10-CM

## 2018-01-26 RX ORDER — HYDROXYZINE 25 MG/1
TABLET, FILM COATED ORAL
Refills: 1 | COMMUNITY
Start: 2017-10-25 | End: 2018-05-03 | Stop reason: ALTCHOICE

## 2018-01-26 NOTE — PATIENT INSTRUCTIONS
Fatigue: Care Instructions  Your Care Instructions    Fatigue is a feeling of tiredness, exhaustion, or lack of energy. You may feel fatigue because of too much or not enough activity. It can also come from stress, lack of sleep, boredom, and poor diet. Many medical problems, such as viral infections, can cause fatigue. Emotional problems, especially depression, are often the cause of fatigue. Fatigue is most often a symptom of another problem. Treatment for fatigue depends on the cause. For example, if you have fatigue because you have a certain health problem, treating this problem also treats your fatigue. If depression or anxiety is the cause, treatment may help. Follow-up care is a key part of your treatment and safety. Be sure to make and go to all appointments, and call your doctor if you are having problems. It's also a good idea to know your test results and keep a list of the medicines you take. How can you care for yourself at home? · Get regular exercise. But don't overdo it. Go back and forth between rest and exercise. · Get plenty of rest.  · Eat a healthy diet. Do not skip meals, especially breakfast.  · Reduce your use of caffeine, tobacco, and alcohol. Caffeine is most often found in coffee, tea, cola drinks, and chocolate. · Limit medicines that can cause fatigue. This includes tranquilizers and cold and allergy medicines. When should you call for help? Watch closely for changes in your health, and be sure to contact your doctor if:  ? · You have new symptoms such as fever or a rash. ? · Your fatigue gets worse. ? · You have been feeling down, depressed, or hopeless. Or you may have lost interest in things that you usually enjoy. ? · You are not getting better as expected. Where can you learn more? Go to http://thomas-bryce.info/. Enter N576 in the search box to learn more about \"Fatigue: Care Instructions. \"  Current as of: March 20, 2017  Content Version: 11.4  © 5110-6872 Healthwise, Incorporated. Care instructions adapted under license by [employer] (which disclaims liability or warranty for this information). If you have questions about a medical condition or this instruction, always ask your healthcare professional. [address] any warranty or liability for your use of this information.

## 2018-01-26 NOTE — PROGRESS NOTES
Chief Complaint   Patient presents with    Fatigue     fatigue and coldness, bloodwork    Nausea     medication refill     1. Have you been to the ER, urgent care clinic since your last visit? Hospitalized since your last visit? No    2. Have you seen or consulted any other health care providers outside of the [address] [place] [address] since your last visit? Include any pap smears or colon screening.  No

## 2018-01-26 NOTE — PROGRESS NOTES
Assessment/Plan:     Diagnoses and all orders for this visit:    1. Fatigue, unspecified type  -     CBC WITH AUTOMATED DIFF  Unclear etiology. Labs today. Discussed dietary modifications. Continue to encourage weight loss. Follow up if symptoms persist.     2. Abnormal TSH  -     TSH 3RD GENERATION  -     T4, FREE  Previously mild hypothyroid. Recheck today. 3. Low serum vitamin D  -     VITAMIN D, 25 HYDROXY    4. Bipolar 1 disorder (HCC)  Assessment & Plan:  Stable, based on history, physical exam and review of pertinent labs, studies and medications; meds reconciled; continue current treatment plan., This condition is managed by Specialist.  Key Psychotherapeutic Meds             lithium carbonate 300 mg capsule  (Taking) TAKE 3 CAPSULES BY MOUTH EVERY MORNING AND 3 CAPSULES EVERY NIGHT AT BEDTIME    LATUDA 120 mg tab tablet  (Taking) TAKE 1 TABLET BY MOUTH ONCE A DAY    DULoxetine (CYMBALTA) 60 mg capsule  (Taking) Take 60 mg by mouth two (2) times a day. Other [facility] Meds             topiramate (TOPAMAX) 50 mg tablet  (Taking) TAKE 1 TABLET BY MOUTH IN THE MORNING AND TAKE 2 TABLETS BY MOUTH IN THE EVENING        Lab Results   Component Value Date/Time    Sodium 140 06/09/2017 10:15 AM    Creatinine 0.75 06/09/2017 10:15 AM    TSH 5.430 06/09/2017 10:16 AM    WBC 9.3 06/09/2017 10:15 AM    ALT (SGPT) 15 06/09/2017 10:15 AM    AST (SGOT) 16 06/09/2017 10:15 AM    Lithium level 0.7 06/09/2017 10:16 AM         5. Obesity (BMI 30-39. 9)  She is working on diet modification and we discussed the addition of more protein to her diet. Follow-up Disposition:  Return if symptoms worsen or fail to improve. Discussed expected course/resolution/complications of diagnosis in detail with patient.    Medication risks/benefits/costs/interactions/alternatives discussed with patient.    Pt was given after visit summary which includes diagnoses, current medications & vitals.    Pt expressed understanding with the diagnosis and plan          Subjective:      Tracie Steinberg is a 20 y.o. female who presents for had concerns including Fatigue and Nausea. Fatigue  Patient complains of fatigue. Symptoms began several weeks ago. Sentinal symptom the patient feels fatigue began with: fatigue upon waking. Symptoms of her fatigue have been general malaise. Patient describes the following psychologic symptoms: none. Patient denies fever, significant change in weight. The course has been gradually improving. Severity has been symptoms bothersome, but easily able to carry out all usual work/school/family. Previous visits for this problem: none. History of anemia which she would like evaluated. She is on continuous oral contraception and denies recent bleeding. She is followed by psychiatry for bipolar disorder which she states as well controlled at this time. Current Outpatient Prescriptions   Medication Sig Dispense Refill    hydrOXYzine HCl (ATARAX) 25 mg tablet TAKE 1 TABLET (25 MG) BY MOUTH EVERY 6 HOURS AS NEEDED ANXIETY  1    dicyclomine (BENTYL) 10 mg capsule TAKE 1 CAP BY MOUTH FOUR (4) TIMES DAILY. 120 Cap 1    ondansetron (ZOFRAN ODT) 8 mg disintegrating tablet Take 1 Tab by mouth every eight (8) hours as needed for Nausea. 25 Tab 0    lithium carbonate 300 mg capsule TAKE 3 CAPSULES BY MOUTH EVERY MORNING AND 3 CAPSULES EVERY NIGHT AT BEDTIME  0    topiramate (TOPAMAX) 50 mg tablet TAKE 1 TABLET BY MOUTH IN THE MORNING AND TAKE 2 TABLETS BY MOUTH IN THE EVENING  0    LATUDA 120 mg tab tablet TAKE 1 TABLET BY MOUTH ONCE A DAY  0    ranitidine (ZANTAC 75) 75 mg tablet Take 75 mg by mouth daily.  JUNEL FE 1/20, 28, 1 mg-20 mcg (21)/75 mg (7) per tablet       DULoxetine (CYMBALTA) 60 mg capsule Take 60 mg by mouth two (2) times a day.  ibuprofen 100 mg tablet Take 100 mg by mouth every six (6) hours as needed.         cholecalciferol, vitamin d3, (VITAMIN D) 1,000 unit tablet Take  by mouth daily. Allergies   Allergen Reactions    Coconut Rash    Pcn [Penicillins] Hives    Soy Hives       ROS:   Complete review of systems was reviewed with pertinent information listed in HPI. Objective:     Visit Vitals    /81 (BP 1 Location: Left arm, BP Patient Position: Sitting)    Pulse 89    Temp 97.2 °F (36.2 °C) (Oral)    Resp 16    Ht 5' 2\" (1.575 m)    Wt 179 lb (81.2 kg)    SpO2 99%    BMI 32.74 kg/m2       Vitals and Nurse Documentation reviewed. Physical Exam   Constitutional: No distress. Neck: No thyroid mass and no thyromegaly present. Cardiovascular: S1 normal and S2 normal.  Exam reveals no gallop and no friction rub. No murmur heard. Pulmonary/Chest: Breath sounds normal. No respiratory distress.    Psychiatric: Mood normal.

## 2018-01-26 NOTE — MR AVS SNAPSHOT
[address] 
[phone] Patient: Carmelina Prader MRN: [id number] UZU:[birth year] Visit Information Date & Time Provider Department Dept. Phone Encounter #  
 1/26/2018  3:00 PM Vallery Hatchet,  [facility] [phone] [id number] Follow-up Instructions Return if symptoms worsen or fail to improve. Upcoming Health Maintenance Date Due  
 HPV AGE 9Y-34Y (1 of 3 - Female 3 Dose Series) 1/20/2004 PAP AKA CERVICAL CYTOLOGY 9/2/2018 DTaP/Tdap/Td series (2 - Td) 6/7/2027 Allergies as of 1/26/2018  Review Complete On: 11/14/2017 By: Carmela Lopez NP Severity Noted Reaction Type Reactions Coconut  03/10/2012    Rash Pcn [Penicillins]  03/10/2012    Hives Soy  09/09/2014    Hives Current Immunizations  Never Reviewed Name Date Influenza Vaccine 9/6/2017, 9/24/2016 Not reviewed this visit You Were Diagnosed With   
  
 Codes Comments Fatigue, unspecified type    -  Primary ICD-10-CM: R53.83 ICD-9-CM: 780.79 Abnormal TSH     ICD-10-CM: R94.6 ICD-9-CM: 790.6 Low serum vitamin D     ICD-10-CM: R79.89 ICD-9-CM: 790.6 Vitals BP Pulse Temp Resp Height(growth percentile) Weight(growth percentile) 127/81 (BP 1 Location: Left arm, BP Patient Position: Sitting) 89 97.2 °F (36.2 °C) (Oral) 16 5' 2\" (1.575 m) 179 lb (81.2 kg) SpO2 BMI OB Status Smoking Status 99% 32.74 kg/m2 Medically Induced Never Smoker Vitals History BMI and BSA Data Body Mass Index Body Surface Area 32.74 kg/m 2 1.88 m 2 Preferred Pharmacy Pharmacy Name Phone [facility] [phone] Your Updated Medication List  
  
   
This list is accurate as of: 1/26/18  4:03 PM.  Always use your most recent med list.  
  
  
  
  
 CYMBALTA 60 mg capsule Generic drug:  DULoxetine Take 60 mg by mouth two (2) times a day. dicyclomine 10 mg capsule Commonly known as:  BENTYL TAKE 1 CAP BY MOUTH FOUR (4) TIMES DAILY. hydrOXYzine HCl 25 mg tablet Commonly known as:  ATARAX TAKE 1 TABLET (25 MG) BY MOUTH EVERY 6 HOURS AS NEEDED ANXIETY  
  
 ibuprofen 100 mg tablet Take 100 mg by mouth every six (6) hours as needed. JUNEL FE 1/20 (28) 1 mg-20 mcg (21)/75 mg (7) Tab Generic drug:  norethindrone-ethinyl estradiol LATUDA 120 mg Tab tablet Generic drug:  lurasidone TAKE 1 TABLET BY MOUTH ONCE A DAY  
  
 lithium carbonate 300 mg capsule TAKE 3 CAPSULES BY MOUTH EVERY MORNING AND 3 CAPSULES EVERY NIGHT AT BEDTIME  
  
 ondansetron 8 mg disintegrating tablet Commonly known as:  ZOFRAN ODT Take 1 Tab by mouth every eight (8) hours as needed for Nausea. topiramate 50 mg tablet Commonly known as:  TOPAMAX TAKE 1 TABLET BY MOUTH IN THE MORNING AND TAKE 2 TABLETS BY MOUTH IN THE EVENING  
  
 VITAMIN D3 1,000 unit tablet Generic drug:  cholecalciferol Take  by mouth daily. ZANTAC 75 75 mg tablet Generic drug:  raNITIdine Take 75 mg by mouth daily. We Performed the Following CBC WITH AUTOMATED DIFF [97690 CPT(R)] T4, FREE [id number] CPT(R)] TSH 3RD GENERATION [70971 CPT(R)] VITAMIN D, 25 HYDROXY [id number] CPT(R)] Follow-up Instructions Return if symptoms worsen or fail to improve. Patient Instructions Fatigue: Care Instructions Your Care Instructions Fatigue is a feeling of tiredness, exhaustion, or lack of energy. You may feel fatigue because of too much or not enough activity. It can also come from stress, lack of sleep, boredom, and poor diet. Many medical problems, such as viral infections, can cause fatigue. Emotional problems, especially depression, are often the cause of fatigue. Fatigue is most often a symptom of another problem.  Treatment for fatigue depends on the cause. For example, if you have fatigue because you have a certain health problem, treating this problem also treats your fatigue. If depression or anxiety is the cause, treatment may help. Follow-up care is a key part of your treatment and safety. Be sure to make and go to all appointments, and call your doctor if you are having problems. It's also a good idea to know your test results and keep a list of the medicines you take. How can you care for yourself at home? · Get regular exercise. But don't overdo it. Go back and forth between rest and exercise. · Get plenty of rest. 
· Eat a healthy diet. Do not skip meals, especially breakfast. 
· Reduce your use of caffeine, tobacco, and alcohol. Caffeine is most often found in coffee, tea, cola drinks, and chocolate. · Limit medicines that can cause fatigue. This includes tranquilizers and cold and allergy medicines. When should you call for help? Watch closely for changes in your health, and be sure to contact your doctor if: 
? · You have new symptoms such as fever or a rash. ? · Your fatigue gets worse. ? · You have been feeling down, depressed, or hopeless. Or you may have lost interest in things that you usually enjoy. ? · You are not getting better as expected. Where can you learn more? Go to http://thomas-bryce.info/. Enter V759 in the search box to learn more about \"Fatigue: Care Instructions. \" Current as of: March 20, 2017 Content Version: 11.4 © 0284-9180 Healthwise, Incorporated. Care instructions adapted under license by [employer] (which disclaims liability or warranty for this information). If you have questions about a medical condition or this instruction, always ask your healthcare professional. [place] [address] any warranty or liability for your use of this information. Introducing [facility] & HEALTH SERVICES! Fabio Barrett introduces [employer] patient portal. Now you can access parts of your medical record, email your doctor's office, and request medication refills online. 1. In your internet browser, go to [email or web address] 2. Click on the First Time User? Click Here link in the Sign In box. You will see the New Member Sign Up page. 3. Enter your [employer] Access Code exactly as it appears below. You will not need to use this code after youve completed the sign-up process. If you do not sign up before the expiration date, you must request a new code. · [employer] Access Code: [email or web address] Expires: 2/12/2018  3:27 PM 
 
4. Enter the last four digits of your Social Security Number (xxxx) and Date of Birth (mm/dd/yyyy) as indicated and click Submit. You will be taken to the next sign-up page. 5. Create a [employer] ID. This will be your [employer] login ID and cannot be changed, so think of one that is secure and easy to remember. 6. Create a [employer] password. You can change your password at any time. 7. Enter your Password Reset Question and Answer. This can be used at a later time if you forget your password. 8. Enter your e-mail address. You will receive e-mail notification when new information is available in [address] 9. Click Sign Up. You can now view and download portions of your medical record. 10. Click the Download Summary menu link to download a portable copy of your medical information. If you have questions, please visit the Frequently Asked Questions section of the [employer] website. Remember, [employer] is NOT to be used for urgent needs. For medical emergencies, dial 911. Now available from your iPhone and Android! Please provide this summary of care documentation to your next provider. Your primary care clinician is listed as Elis Vásquez. If you have any questions after today's visit, please call [phone].

## 2018-01-27 LAB
25(OH)D3+25(OH)D2 SERPL-MCNC: 10.7 NG/ML (ref 30–100)
BASOPHILS # BLD AUTO: 0.1 X10E3/UL (ref 0–0.2)
BASOPHILS NFR BLD AUTO: 1 %
EOSINOPHIL # BLD AUTO: 0.2 X10E3/UL (ref 0–0.4)
EOSINOPHIL NFR BLD AUTO: 3 %
ERYTHROCYTE [DISTWIDTH] IN BLOOD BY AUTOMATED COUNT: 13.8 % (ref 12.3–15.4)
HCT VFR BLD AUTO: 38.5 % (ref 34–46.6)
HGB BLD-MCNC: 12.7 G/DL (ref 11.1–15.9)
IMM GRANULOCYTES # BLD: 0 X10E3/UL (ref 0–0.1)
IMM GRANULOCYTES NFR BLD: 0 %
LYMPHOCYTES # BLD AUTO: 2.8 X10E3/UL (ref 0.7–3.1)
LYMPHOCYTES NFR BLD AUTO: 35 %
MCH RBC QN AUTO: 27 PG (ref 26.6–33)
MCHC RBC AUTO-ENTMCNC: 33 G/DL (ref 31.5–35.7)
MCV RBC AUTO: 82 FL (ref 79–97)
MONOCYTES # BLD AUTO: 0.6 X10E3/UL (ref 0.1–0.9)
MONOCYTES NFR BLD AUTO: 7 %
NEUTROPHILS # BLD AUTO: 4.3 X10E3/UL (ref 1.4–7)
NEUTROPHILS NFR BLD AUTO: 54 %
PLATELET # BLD AUTO: 379 X10E3/UL (ref 150–379)
RBC # BLD AUTO: 4.71 X10E6/UL (ref 3.77–5.28)
T4 FREE SERPL-MCNC: 1.05 NG/DL (ref 0.82–1.77)
TSH SERPL DL<=0.005 MIU/L-ACNC: 2.4 UIU/ML (ref 0.45–4.5)
WBC # BLD AUTO: 8 X10E3/UL (ref 3.4–10.8)

## 2018-01-29 DIAGNOSIS — R79.89 LOW SERUM VITAMIN D: Primary | ICD-10-CM

## 2018-01-29 RX ORDER — ERGOCALCIFEROL 1.25 MG/1
50000 CAPSULE ORAL
Qty: 12 CAP | Refills: 3 | Status: SHIPPED | OUTPATIENT
Start: 2018-01-29 | End: 2018-07-31 | Stop reason: SDUPTHER

## 2018-01-29 NOTE — PROGRESS NOTES
Vitamin D is very low. Rest of labs are normal.  I have called in a prescription vitamin d to take once weekly. Let's see if that improves her energy.

## 2018-01-30 ENCOUNTER — TELEPHONE (OUTPATIENT)
Dept: FAMILY MEDICINE CLINIC | Age: 23
End: 2018-01-30

## 2018-01-30 NOTE — PROGRESS NOTES
Received permission from patient to leave detailed message.  Informed patient of results and recommendations

## 2018-01-30 NOTE — TELEPHONE ENCOUNTER
Patient is calling in regards to her most recent lab draw, she is requesting a call back with the results.      Best call back # for patient: [phone] *patient states that she works tonight and would like to give permission to leave a  if needed*

## 2018-01-30 NOTE — TELEPHONE ENCOUNTER
Notes Recorded by Pam Collier LPN on 0/61/4288 at 34:85 PM  Received permission from patient to leave detailed message. Informed patient of results and recommendations    RE:  Notes Recorded by Laurel Wright NP on 1/29/2018 at 8:07 AM  Vitamin D is very low.  Rest of labs are normal.  I have called in a prescription vitamin d to take once weekly.  Let's see if that improves her energy.

## 2018-02-09 RX ORDER — DICYCLOMINE HYDROCHLORIDE 10 MG/1
CAPSULE ORAL
Qty: 120 CAP | Refills: 1 | Status: SHIPPED | OUTPATIENT
Start: 2018-02-09 | End: 2018-04-03 | Stop reason: SDUPTHER

## 2018-02-20 ENCOUNTER — TELEPHONE (OUTPATIENT)
Dept: FAMILY MEDICINE CLINIC | Age: 23
End: 2018-02-20

## 2018-02-20 NOTE — TELEPHONE ENCOUNTER
----- Message from Maria C Navarro sent at 2/20/2018  9:45 AM EST -----  Regarding: DENI Woodard/Zach  The patient is requesting a call back from the NP to confirm that she needs to continue taking Rx Vitamin D. ([phone]

## 2018-02-21 NOTE — TELEPHONE ENCOUNTER
If she is getting through milk and diet as well as 15 minutes of sun exposure daily, can stop and recheck at next routine lab draw.

## 2018-02-21 NOTE — TELEPHONE ENCOUNTER
Call to patient.  verified. Informed patient that if she gets Vitamin d though diet and milk and sun she can stop high dose vitamin d. Patient states she does not drink milk. Not getting a lot through her diet. Informed patient to continue taking vitamin d and to follow up with pcp or alejandra at the end of March (around 8 weeks) for evaluation and lab draw.  Patient understands

## 2018-03-27 ENCOUNTER — TELEPHONE (OUTPATIENT)
Dept: FAMILY MEDICINE CLINIC | Age: 23
End: 2018-03-27

## 2018-03-27 NOTE — TELEPHONE ENCOUNTER
----- Message from Arnold Sadler sent at 3/27/2018  8:18 AM EDT -----  Regarding: DENI Woodard/telephone  Pt would like a call back regarding a bill she received for lab that were drawn at [address] [place] [address] contacted [employer] and was told that she needed to contact [facility] Cal[facility]illo patient billing. When she spoke to the billing department she was told that she would have to contact her doctor's office, because the bill had the wrong codes on it Pt spoke to her insurance company and was told that the thyroxine and vitamin d should be covered under her insurance.  Pt can be reached at [phone].

## 2018-04-04 ENCOUNTER — TELEPHONE (OUTPATIENT)
Dept: FAMILY MEDICINE CLINIC | Age: 23
End: 2018-04-04

## 2018-04-04 RX ORDER — DICYCLOMINE HYDROCHLORIDE 10 MG/1
CAPSULE ORAL
Qty: 120 CAP | Refills: 1 | Status: SHIPPED | OUTPATIENT
Start: 2018-04-04 | End: 2019-04-22 | Stop reason: SDUPTHER

## 2018-04-04 NOTE — TELEPHONE ENCOUNTER
DENI Stevens, LPN        Caller: Unspecified (Today,  4:03 PM)                     She needs to call her psychiatrist       Patient informed of note.

## 2018-04-04 NOTE — TELEPHONE ENCOUNTER
Patient is calling stating that she was seen by her Psychiatrics and was prescribed Braylar 3mg for her Bi-Polor Disorder. She states that she has been experiencing hand tremors on and off and would like [facility] opinion about it.     Patient last seen by Yamilet :January 26, 2018  Patient is requesting a call back from the nurse    Patients best call back : [phone]

## 2018-05-02 ENCOUNTER — TELEPHONE (OUTPATIENT)
Dept: FAMILY MEDICINE CLINIC | Age: 23
End: 2018-05-02

## 2018-05-02 NOTE — TELEPHONE ENCOUNTER
Called and spoke with patient. Patient states that she ate salami and crackers. Patient did suspect that the salami was bad but it smelled fine and tasted fine. Patient states that she had nausea and vomiting that started last night 3 hrs after eating the salami and crackers. Spoke to Antony Bautista NP, she states for patient to drink carolin tea with honey and to see Ricarda Mcmanus NP tomorrow. Gave patient this message.  Appt is scheduled for Thursday, May 03, 2018 10:15 AM.

## 2018-05-02 NOTE — TELEPHONE ENCOUNTER
Pt feels she ate something that is making her feel bad. Would like to know if we can call in a script.  She will come in if needed  Pharmacy on file verified  Best # [phone]

## 2018-05-03 ENCOUNTER — OFFICE VISIT (OUTPATIENT)
Dept: FAMILY MEDICINE CLINIC | Age: 23
End: 2018-05-03

## 2018-05-03 VITALS
OXYGEN SATURATION: 98 % | BODY MASS INDEX: 30.69 KG/M2 | TEMPERATURE: 98.5 F | RESPIRATION RATE: 16 BRPM | HEIGHT: 62 IN | WEIGHT: 166.8 LBS | DIASTOLIC BLOOD PRESSURE: 70 MMHG | HEART RATE: 99 BPM | SYSTOLIC BLOOD PRESSURE: 108 MMHG

## 2018-05-03 DIAGNOSIS — A08.4 VIRAL GASTROENTERITIS: Primary | ICD-10-CM

## 2018-05-03 DIAGNOSIS — E66.9 OBESITY (BMI 30-39.9): ICD-10-CM

## 2018-05-03 RX ORDER — ONDANSETRON 8 MG/1
8 TABLET, ORALLY DISINTEGRATING ORAL
Qty: 20 TAB | Refills: 0 | Status: SHIPPED | OUTPATIENT
Start: 2018-05-03 | End: 2019-05-08 | Stop reason: SDUPTHER

## 2018-05-03 NOTE — MR AVS SNAPSHOT
Pham Howard 
 
 
 [address] 
[phone] Patient: Sophia Stoll MRN: [id number] QRY:9/99/[birth year] Visit Information Date & Time Provider Department Dept. Phone Encounter #  
 5/3/2018 10:15 AM Jamison Gracia  [facility] [phone] [id number] Upcoming Health Maintenance Date Due  
 HPV Age 9Y-34Y (1 of 1 - Female 3 Dose Series) 1/20/2004 Influenza Age 5 to Adult 8/1/2018 PAP AKA CERVICAL CYTOLOGY 9/2/2018 DTaP/Tdap/Td series (2 - Td) 6/7/2027 Allergies as of 5/3/2018  Review Complete On: 5/3/2018 By: Jamison Gracia NP Severity Noted Reaction Type Reactions Coconut  03/10/2012    Rash Pcn [Penicillins]  03/10/2012    Hives Soy  09/09/2014    Hives Current Immunizations  Never Reviewed Name Date Influenza Vaccine 9/6/2017, 9/24/2016 Not reviewed this visit You Were Diagnosed With   
  
 Codes Comments Viral gastroenteritis    -  Primary ICD-10-CM: A08.4 ICD-9-CM: 637. 8 Vitals BP Pulse Temp Resp Height(growth percentile) Weight(growth percentile) 108/70 99 98.5 °F (36.9 °C) (Oral) 16 5' 2\" (1.575 m) 166 lb 12.8 oz (75.7 kg) SpO2 BMI OB Status Smoking Status 98% 30.51 kg/m2 Medically Induced Never Smoker BMI and BSA Data Body Mass Index Body Surface Area 30.51 kg/m 2 1.82 m 2 Preferred Pharmacy [facility] [address] [phone] Your Updated Medication List  
  
   
This list is accurate as of 5/3/18 10:36 AM.  Always use your most recent med list.  
  
  
  
  
 CYMBALTA 60 mg capsule Generic drug:  DULoxetine Take 60 mg by mouth two (2) times a day. dicyclomine 10 mg capsule Commonly known as:  BENTYL TAKE 1 CAPSULE BY MOUTH 4 TIMES A DAY  
  
 ergocalciferol 50,000 unit capsule Commonly known as:  ERGOCALCIFEROL Take 1 Cap by mouth every seven (7) days. Indications: Vitamin D Deficiency  
  
 ibuprofen 100 mg tablet Take 100 mg by mouth every six (6) hours as needed. JUNEL FE 1/20 (28) 1 mg-20 mcg (21)/75 mg (7) Tab Generic drug:  norethindrone-ethinyl estradiol  
  
 lithium carbonate 300 mg capsule TAKE 3 CAPSULES BY MOUTH EVERY MORNING AND 3 CAPSULES EVERY NIGHT AT BEDTIME  
  
 ondansetron 8 mg disintegrating tablet Commonly known as:  ZOFRAN ODT Take 1 Tab by mouth every eight (8) hours as needed for Nausea. topiramate 50 mg tablet Commonly known as:  TOPAMAX TAKE 1 TABLET BY MOUTH IN THE MORNING AND TAKE 2 TABLETS BY MOUTH IN THE EVENING  
  
 VRAYLAR 3 mg capsule Generic drug:  cariprazine Take 3 mg by mouth nightly. ZANTAC 75 75 mg tablet Generic drug:  raNITIdine Take 75 mg by mouth daily. Prescriptions Sent to Pharmacy Refills  
 ondansetron (ZOFRAN ODT) 8 mg disintegrating tablet 0 Sig: Take 1 Tab by mouth every eight (8) hours as needed for Nausea. Class: Normal  
 Pharmacy: [facility] [address] [address] [place] Ph #: [phone] Route: Oral  
  
Introducing [facility] & HEALTH SERVICES! [employer] introduces [employer] patient portal. Now you can access parts of your medical record, email your doctor's office, and request medication refills online. 1. In your internet browser, go to [email or web address] 2. Click on the First Time User? Click Here link in the Sign In box. You will see the New Member Sign Up page. 3. Enter your [employer] Access Code exactly as it appears below. You will not need to use this code after youve completed the sign-up process. If you do not sign up before the expiration date, you must request a new code. · [employer] Access Code: [email or web address] Expires: 8/1/2018 10:36 AM 
 
 4. Enter the last four digits of your Social Security Number (xxxx) and Date of Birth (mm/dd/yyyy) as indicated and click Submit. You will be taken to the next sign-up page. 5. Create a [employer] ID. This will be your [employer] login ID and cannot be changed, so think of one that is secure and easy to remember. 6. Create a [employer] password. You can change your password at any time. 7. Enter your Password Reset Question and Answer. This can be used at a later time if you forget your password. 8. Enter your e-mail address. You will receive e-mail notification when new information is available in [address] 9. Click Sign Up. You can now view and download portions of your medical record. 10. Click the Download Summary menu link to download a portable copy of your medical information. If you have questions, please visit the Frequently Asked Questions section of the [employer] website. Remember, [employer] is NOT to be used for urgent needs. For medical emergencies, dial 911. Now available from your iPhone and Android! Please provide this summary of care documentation to your next provider. Your primary care clinician is listed as Eric SCHNEIDER. If you have any questions after today's visit, please call [phone].

## 2018-05-03 NOTE — LETTER
NOTIFICATION RETURN TO WORK / SCHOOL 
 
5/3/2018 10:34 AM 
 
Ms. Tushar Hernández [address] [address] To Whom It May Concern: 
 
Tushar Hernández is currently under the care of DELANEY Leroy. She has been out school 5/2/18 and 5/3/18 due to illness. If there are questions or concerns please have the patient contact our office.  
 
 
 
Sincerely, 
 
 
Jamison Gracia NP

## 2018-05-03 NOTE — PROGRESS NOTES
Pt presents to office today for a Nausea and Vomiting that she has had for last 3 days, threw up once 5/1/2018. She reports that she felt really sick the last 2 days and has been dry heaving especially after eating a meal. Pt also states that she is also taking Benzatropine (from her psychiatrist) but not sure os her dosage, so will call office back with that information. Chief Complaint   Patient presents with    Nausea     For 3 days    Vomiting     For 3 day, has not taken medication for her symptoms yet. 1. Have you been to the ER, urgent care clinic since your last visit? Hospitalized since your last visit? No    2. Have you seen or consulted any other health care providers outside of the [address] [place] [address] since your last visit? Include any pap smears or colon screening.  No

## 2018-05-03 NOTE — PROGRESS NOTES
HISTORY OF PRESENT ILLNESS  Nuria Nova is a 20 y.o. female. HPI  C/o nausea x 3 days. Positive vomiting three days ago, now resolved. No fever, mild chills. Continues with mild nausea. Appetite is improving. Taking in liquids without difficulty. Past medical history, social history, family history and medications were reviewed and updated. Visit Vitals    /70    Pulse 99    Temp 98.5 °F (36.9 °C) (Oral)    Resp 16    Ht 5' 2\" (1.575 m)    Wt 166 lb 12.8 oz (75.7 kg)    SpO2 98%    BMI 30.51 kg/m2     Review of Systems   Constitutional: Negative for chills, fever and malaise/fatigue. Respiratory: Negative. Cardiovascular: Negative. Gastrointestinal: Positive for nausea. Negative for abdominal pain, constipation, diarrhea and vomiting. All other systems reviewed and are negative. Physical Exam   Constitutional: No distress. Overweight. Neck: Neck supple. Cardiovascular: Normal rate, regular rhythm and normal heart sounds. Pulmonary/Chest: Effort normal and breath sounds normal.   Abdominal: Soft. She exhibits no distension. There is no tenderness. There is no guarding. Lymphadenopathy:     She has no cervical adenopathy. Skin: Skin is warm and dry. ASSESSMENT and PLAN  Diagnoses and all orders for this visit:    1. Viral gastroenteritis  -     ondansetron (ZOFRAN ODT) 8 mg disintegrating tablet; Take 1 Tab by mouth every eight (8) hours as needed for Nausea. Symptoms resolving. Soft, bland diet. Advance as tolerated. Maintain adequate fluid intake. May use zofran prn. Medication risks, benefits and potential side effects were reviewed. 2. Obesity (BMI 30-39. 9)  Reviewed healthy diet and exercise recommendations. Follow up if sx worsen or fail to resolve.

## 2018-07-10 ENCOUNTER — OFFICE VISIT (OUTPATIENT)
Dept: FAMILY MEDICINE CLINIC | Age: 23
End: 2018-07-10

## 2018-07-10 VITALS
DIASTOLIC BLOOD PRESSURE: 88 MMHG | HEART RATE: 98 BPM | BODY MASS INDEX: 30.47 KG/M2 | TEMPERATURE: 98.2 F | RESPIRATION RATE: 16 BRPM | OXYGEN SATURATION: 100 % | HEIGHT: 62 IN | SYSTOLIC BLOOD PRESSURE: 128 MMHG | WEIGHT: 165.6 LBS

## 2018-07-10 DIAGNOSIS — K58.9 IRRITABLE BOWEL SYNDROME, UNSPECIFIED TYPE: ICD-10-CM

## 2018-07-10 DIAGNOSIS — E66.9 OBESITY (BMI 30.0-34.9): ICD-10-CM

## 2018-07-10 DIAGNOSIS — R10.30 LOWER ABDOMINAL PAIN: Primary | ICD-10-CM

## 2018-07-10 LAB
BILIRUB UR QL STRIP: NEGATIVE
GLUCOSE UR-MCNC: NEGATIVE MG/DL
HCG URINE, QL. (POC): NEGATIVE
KETONES P FAST UR STRIP-MCNC: NEGATIVE MG/DL
PH UR STRIP: 7 [PH] (ref 4.6–8)
PROT UR QL STRIP: NEGATIVE
SP GR UR STRIP: 1.01 (ref 1–1.03)
UA UROBILINOGEN AMB POC: NORMAL (ref 0.2–1)
URINALYSIS CLARITY POC: CLEAR
URINALYSIS COLOR POC: COLORLESS
URINE BLOOD POC: NEGATIVE
URINE LEUKOCYTES POC: NORMAL
URINE NITRITES POC: NEGATIVE
VALID INTERNAL CONTROL?: YES

## 2018-07-10 RX ORDER — FAMOTIDINE 20 MG/1
20 TABLET, FILM COATED ORAL 2 TIMES DAILY
Qty: 30 TAB | Refills: 0 | Status: SHIPPED | OUTPATIENT
Start: 2018-07-10 | End: 2018-08-12 | Stop reason: SDUPTHER

## 2018-07-10 RX ORDER — BENZTROPINE MESYLATE 1 MG/1
TABLET ORAL 2 TIMES DAILY
COMMUNITY
End: 2020-08-31 | Stop reason: ALTCHOICE

## 2018-07-10 NOTE — PROGRESS NOTES
HISTORY OF PRESENT ILLNESS  Shannon Park is a 20 y.o. female. HPI: Patient complaints of pain in lower abdomen x two days. She describes pain as burning. It is associated with frequent bowel movements  She has histroy of IBS, depression/anxiety and FM. She is under care of psychiatrist for her bipolar disorder  . She reports her girlfriend and her broke up recently. She is very anxious , as a result she has been eating a lot of fast food and drinking more alcohol. She lives with her parents and usually eat at home. She sexually active. and reports she has been also having frequent urination. She obese, doesn't exercise, doesn't watch diet  Past Medical History:   Diagnosis Date    Anemia     Anxiety     Arm fracture     Bipolar 1 disorder (HCC)     Depression     Fibromyalgia     IBS (irritable bowel syndrome)      History reviewed. No pertinent surgical history. Allergies   Allergen Reactions    Coconut Rash    Pcn [Penicillins] Hives    Soy Hives       Current Outpatient Prescriptions:     benztropine (COGENTIN) 1 mg tablet, Take  by mouth two (2) times a day., Disp: , Rfl:     famotidine (PEPCID) 20 mg tablet, Take 1 Tab by mouth two (2) times a day., Disp: 30 Tab, Rfl: 0    cariprazine (VRAYLAR) 3 mg capsule, Take 4.5 mg by mouth nightly., Disp: , Rfl:     ondansetron (ZOFRAN ODT) 8 mg disintegrating tablet, Take 1 Tab by mouth every eight (8) hours as needed for Nausea., Disp: 20 Tab, Rfl: 0    dicyclomine (BENTYL) 10 mg capsule, TAKE 1 CAPSULE BY MOUTH 4 TIMES A DAY, Disp: 120 Cap, Rfl: 1    ergocalciferol (ERGOCALCIFEROL) 50,000 unit capsule, Take 1 Cap by mouth every seven (7) days.  Indications: Vitamin D Deficiency, Disp: 12 Cap, Rfl: 3    lithium carbonate 300 mg capsule, TAKE 3 CAPSULES BY MOUTH EVERY MORNING AND 3 CAPSULES EVERY NIGHT AT BEDTIME, Disp: , Rfl: 0    topiramate (TOPAMAX) 50 mg tablet, TAKE 1 TABLET BY MOUTH IN THE MORNING AND TAKE 2 TABLETS BY MOUTH IN THE EVENING, Disp: , Rfl: 0    ranitidine (ZANTAC 75) 75 mg tablet, Take 75 mg by mouth daily. , Disp: , Rfl:     JUNEL FE 1/20, 28, 1 mg-20 mcg (21)/75 mg (7) per tablet, , Disp: , Rfl:     DULoxetine (CYMBALTA) 60 mg capsule, Take 60 mg by mouth daily. Takes 1 cap in the am., Disp: , Rfl:     ibuprofen 100 mg tablet, Take 100 mg by mouth every six (6) hours as needed. , Disp: , Rfl:   Review of Systems   Constitutional: Negative. Respiratory: Negative. Cardiovascular: Negative. Gastrointestinal: Positive for abdominal pain. Genitourinary: Positive for frequency. Blood pressure 128/88, pulse 98, temperature 98.2 °F (36.8 °C), temperature source Oral, resp. rate 16, height 5' 2\" (1.575 m), weight 165 lb 9.6 oz (75.1 kg), SpO2 100 %. Body mass index is 30.29 kg/(m^2). Physical Exam   Constitutional: No distress. HENT:   Mouth/Throat: Oropharynx is clear and moist.   Neck: Normal range of motion. Neck supple. Cardiovascular: Normal rate and regular rhythm. No murmur heard. Pulmonary/Chest: Effort normal and breath sounds normal.   Abdominal: Soft. Bowel sounds are normal. She exhibits no distension and no mass. There is tenderness. There is no rebound and no guarding. Genitourinary: No vaginal discharge found. Genitourinary Comments: UA is positive for (+1) leukocytes  Urine HCG is negative   Nursing note and vitals reviewed. ASSESSMENT and PLAN  Diagnoses and all orders for this visit:    1. Lower abdominal pain  -     AMB POC URINALYSIS DIP STICK MANUAL W/O MICRO  -     famotidine (PEPCID) 20 mg tablet; Take 1 Tab by mouth two (2) times a day. -     CULTURE, URINE  -     AMB POC URINE PREGNANCY TEST, VISUAL COLOR COMPARISON        -       2. Irritable bowel syndrome, unspecified type       Take bentyl QID rather than bid        Avoid fast food and alcohol  3.  Obesity (BMI 30.0-34.9)      Normal BMI discussed, advised to watch diet and exercise  Follow up if not improved  Pt was given an after visit summary which includes diagnosis, current medicines and vital and voiced understanding of treatment plan

## 2018-07-10 NOTE — PROGRESS NOTES
Chief Complaint   Patient presents with    Abdominal Pain     Right behind belly button , feels like a scratch on the inside for about 2 days, worse today. 1. Have you been to the ER, urgent care clinic since your last visit? Hospitalized since your last visit? No    2. Have you seen or consulted any other health care providers outside of the [address] [place] [address] since your last visit? Include any pap smears or colon screening.  No

## 2018-07-10 NOTE — MR AVS SNAPSHOT
[address] 
[phone] Patient: Ara Orantes MRN: [id number] NNW:[birth year] Visit Information Date & Time Provider Department Dept. Phone Encounter #  
 7/10/2018  2:45 PM Ashwini Jerze, 403 [facility] [phone] [id number] Upcoming Health Maintenance Date Due  
 HPV Age 9Y-34Y (1 of 1 - Female 3 Dose Series) 1/20/2004 PAP AKA CERVICAL CYTOLOGY 9/2/2018 Influenza Age 5 to Adult 8/1/2018 DTaP/Tdap/Td series (2 - Td) 6/7/2027 Allergies as of 7/10/2018  Review Complete On: 7/10/2018 By: Ashwini Jerez NP Severity Noted Reaction Type Reactions Coconut  03/10/2012    Rash Pcn [Penicillins]  03/10/2012    Hives Soy  09/09/2014    Hives Current Immunizations  Never Reviewed Name Date Influenza Vaccine 9/6/2017, 9/24/2016 Not reviewed this visit You Were Diagnosed With   
  
 Codes Comments Lower abdominal pain    -  Primary ICD-10-CM: R10.30 ICD-9-CM: 789.09 Irritable bowel syndrome, unspecified type     ICD-10-CM: K58.9 ICD-9-CM: 666.7 Vitals BP Pulse Temp Resp Height(growth percentile) Weight(growth percentile) 128/88 98 98.2 °F (36.8 °C) (Oral) 16 5' 2\" (1.575 m) 165 lb 9.6 oz (75.1 kg) SpO2 BMI OB Status Smoking Status 100% 30.29 kg/m2 Medically Induced Never Smoker Vitals History BMI and BSA Data Body Mass Index Body Surface Area  
 30.29 kg/m 2 1.81 m 2 Preferred Pharmacy Pharmacy Name Phone [facility] [address] [phone] Your Updated Medication List  
  
   
This list is accurate as of 7/10/18  3:02 PM.  Always use your most recent med list.  
  
  
  
  
 benztropine 1 mg tablet Commonly known as:  COGENTIN Take  by mouth two (2) times a day. CYMBALTA 60 mg capsule Generic drug:  DULoxetine Take 60 mg by mouth daily. Takes 1 cap in the am.  
  
 dicyclomine 10 mg capsule Commonly known as:  BENTYL TAKE 1 CAPSULE BY MOUTH 4 TIMES A DAY  
  
 ergocalciferol 50,000 unit capsule Commonly known as:  ERGOCALCIFEROL Take 1 Cap by mouth every seven (7) days. Indications: Vitamin D Deficiency  
  
 famotidine 20 mg tablet Commonly known as:  PEPCID Take 1 Tab by mouth two (2) times a day. ibuprofen 100 mg tablet Take 100 mg by mouth every six (6) hours as needed. JUNEL FE 1/20 (28) 1 mg-20 mcg (21)/75 mg (7) Tab Generic drug:  norethindrone-ethinyl estradiol  
  
 lithium carbonate 300 mg capsule TAKE 3 CAPSULES BY MOUTH EVERY MORNING AND 3 CAPSULES EVERY NIGHT AT BEDTIME  
  
 ondansetron 8 mg disintegrating tablet Commonly known as:  ZOFRAN ODT Take 1 Tab by mouth every eight (8) hours as needed for Nausea. topiramate 50 mg tablet Commonly known as:  TOPAMAX TAKE 1 TABLET BY MOUTH IN THE MORNING AND TAKE 2 TABLETS BY MOUTH IN THE EVENING  
  
 VRAYLAR 3 mg capsule Generic drug:  cariprazine Take 4.5 mg by mouth nightly. ZANTAC 75 75 mg tablet Generic drug:  raNITIdine Take 75 mg by mouth daily. Prescriptions Sent to Pharmacy Refills  
 famotidine (PEPCID) 20 mg tablet 0 Sig: Take 1 Tab by mouth two (2) times a day. Class: Normal  
 Pharmacy: [facility] [address] [place] [address] [place] Ph #: [phone] Route: Oral  
  
We Performed the Following AMB POC URINALYSIS DIP STICK MANUAL W/O MICRO [42024 CPT(R)] CULTURE, URINE [id number] CPT(R)] Introducing [facility] & HEALTH SERVICES! [address] introduces Zuber[employer] patient portal. Now you can access parts of your medical record, email your doctor's office, and request medication refills online. 1. In your internet browser, go to [email or web address] 2. Click on the First Time User? Click Here link in the Sign In box.  You will see the New Member Sign Up page. 3. Enter your Spotted Access Code exactly as it appears below. You will not need to use this code after youve completed the sign-up process. If you do not sign up before the expiration date, you must request a new code. · Spotted Access Code: [email or web address] Expires: 8/1/2018 10:36 AM 
 
4. Enter the last four digits of your Social Security Number (xxxx) and Date of Birth (mm/dd/yyyy) as indicated and click Submit. You will be taken to the next sign-up page. 5. Create a Aetel[employer]inc (Droppy)t ID. This will be your Spotted login ID and cannot be changed, so think of one that is secure and easy to remember. 6. Create a Spotted password. You can change your password at any time. 7. Enter your Password Reset Question and Answer. This can be used at a later time if you forget your password. 8. Enter your e-mail address. You will receive e-mail notification when new information is available in [address] 9. Click Sign Up. You can now view and download portions of your medical record. 10. Click the Download Summary menu link to download a portable copy of your medical information. If you have questions, please visit the Frequently Asked Questions section of the Spotted website. Remember, Spotted is NOT to be used for urgent needs. For medical emergencies, dial 911. Now available from your iPhone and Android! Please provide this summary of care documentation to your next provider. Your primary care clinician is listed as Alyson SCHNEIDER. If you have any questions after today's visit, please call [phone].

## 2018-07-11 LAB — BACTERIA UR CULT: NORMAL

## 2018-07-16 ENCOUNTER — TELEPHONE (OUTPATIENT)
Dept: FAMILY MEDICINE CLINIC | Age: 23
End: 2018-07-16

## 2018-07-17 NOTE — TELEPHONE ENCOUNTER
Sher Gasca, DENI Cristobal LPN        Caller: Unspecified Bipin August,  3:43 PM)                     Left a message with labs results

## 2018-07-31 ENCOUNTER — OFFICE VISIT (OUTPATIENT)
Dept: FAMILY MEDICINE CLINIC | Age: 23
End: 2018-07-31

## 2018-07-31 VITALS
OXYGEN SATURATION: 98 % | BODY MASS INDEX: 29.96 KG/M2 | HEIGHT: 62 IN | HEART RATE: 86 BPM | DIASTOLIC BLOOD PRESSURE: 78 MMHG | SYSTOLIC BLOOD PRESSURE: 110 MMHG | RESPIRATION RATE: 16 BRPM | WEIGHT: 162.8 LBS | TEMPERATURE: 98.1 F

## 2018-07-31 DIAGNOSIS — E66.3 OVER WEIGHT: ICD-10-CM

## 2018-07-31 DIAGNOSIS — R79.89 LOW SERUM VITAMIN D: ICD-10-CM

## 2018-07-31 DIAGNOSIS — L08.9 SKIN INFECTION: Primary | ICD-10-CM

## 2018-07-31 DIAGNOSIS — M79.7 FIBROMYALGIA: ICD-10-CM

## 2018-07-31 RX ORDER — DOXYCYCLINE 100 MG/1
100 TABLET ORAL 2 TIMES DAILY
Qty: 20 TAB | Refills: 0 | Status: SHIPPED | OUTPATIENT
Start: 2018-07-31 | End: 2018-08-10

## 2018-07-31 RX ORDER — ERGOCALCIFEROL 1.25 MG/1
50000 CAPSULE ORAL
Qty: 12 CAP | Refills: 3 | Status: SHIPPED | OUTPATIENT
Start: 2018-07-31 | End: 2021-04-08 | Stop reason: ALTCHOICE

## 2018-07-31 RX ORDER — CYCLOBENZAPRINE HCL 5 MG
5 TABLET ORAL
Qty: 20 TAB | Refills: 0 | Status: SHIPPED | OUTPATIENT
Start: 2018-07-31 | End: 2018-09-07 | Stop reason: SDUPTHER

## 2018-07-31 NOTE — LETTER
NOTIFICATION RETURN TO WORK / SCHOOL 
 
7/31/2018 3:33 PM 
 
Ms. Tushar Hernández [address] [address] To Whom It May Concern: 
 
Tushar Hernández is currently under the care of DELANEY Leroy. She return to school 8/1/2018 If there are questions or concerns please have the patient contact our office. Sincerely, Chris Hart NP

## 2018-07-31 NOTE — PATIENT INSTRUCTIONS
Fibromyalgia: Care Instructions  Your Care Instructions    Fibromyalgia is a painful condition that is not completely understood by medical experts. The cause of fibromyalgia is not known. It can make you feel tired and ache all over. It causes tender spots at specific points of the body that hurt only when you press on them. You may have trouble sleeping, as well as other symptoms. These problems can upset your work and home life. Symptoms tend to come and go, although they may never go away completely. Fibromyalgia does not harm your muscles, joints, or organs. Follow-up care is a key part of your treatment and safety. Be sure to make and go to all appointments, and call your doctor if you are having problems. It's also a good idea to know your test results and keep a list of the medicines you take. How can you care for yourself at home? · Exercise often. Walk, swim, or bike to help with pain and sleep problems and to make you feel better. · Try to get a good night's sleep. Go to bed and get up at the same time each day, whether you feel rested or not. Make sure you have a good mattress and pillow. · Reduce stress. Avoid things that cause you stress, if you can. If not, work at making them less stressful. Learn to use biofeedback, guided imagery, meditation, or other methods to relax. · Make healthy changes. Eat a balanced diet, quit smoking, and limit alcohol and caffeine. · Use a heating pad set on low or take warm baths or showers for pain. Using cold packs for up to 20 minutes at a time can also relieve pain. Put a thin cloth between the cold pack and your skin. A gentle massage might help too. · Be safe with medicines. Take your medicines exactly as prescribed. Call your doctor if you think you are having a problem with your medicine. Your doctor may talk to you about taking antidepressant medicines. These medicines may improve sleep, relieve pain, and in some cases treat depression.   · Learn about fibromyalgia. This makes coping easier. Then, take an active role in your treatment. · Think about joining a support group with others who have fibromyalgia to learn more and get support. When should you call for help? Watch closely for changes in your health, and be sure to contact your doctor if:    · You feel sad, helpless, or hopeless; lose interest in things you used to enjoy; or have other symptoms of depression.     · Your fibromyalgia symptoms get worse. Where can you learn more? Go to http://thomas-bryce.info/. Enter V003 in the search box to learn more about \"Fibromyalgia: Care Instructions. \"  Current as of: October 9, 2017  Content Version: 11.7  © 3706-3810 [employer], [employer]. Care instructions adapted under license by [employer]map (which disclaims liability or warranty for this information). If you have questions about a medical condition or this instruction, always ask your healthcare professional. [address] any warranty or liability for your use of this information.

## 2018-07-31 NOTE — MR AVS SNAPSHOT
[address] [facility] 
 
 
 [address] 
[phone] Patient: Luis Richards MRN: [id number] EEI:[birth year] Visit Information Date & Time Provider Department Dept. Phone Encounter #  
 7/31/2018  3:00 PM Viky Turcios, 403 [facility] [phone] [id number] Upcoming Health Maintenance Date Due  
 HPV Age 9Y-34Y (1 of 1 - Female 3 Dose Series) 1/20/2004 PAP AKA CERVICAL CYTOLOGY 9/2/2018 Influenza Age 5 to Adult 8/1/2018 DTaP/Tdap/Td series (2 - Td) 6/7/2027 Allergies as of 7/31/2018  Review Complete On: 7/31/2018 By: Viky Turcios NP Severity Noted Reaction Type Reactions Coconut  03/10/2012    Rash Pcn [Penicillins]  03/10/2012    Hives Soy  09/09/2014    Hives Current Immunizations  Never Reviewed Name Date Influenza Vaccine 9/6/2017, 9/24/2016 Not reviewed this visit You Were Diagnosed With   
  
 Codes Comments Skin infection    -  Primary ICD-10-CM: L08.9 ICD-9-CM: 686.9 Low serum vitamin D     ICD-10-CM: R79.89 ICD-9-CM: 790.6 Fibromyalgia     ICD-10-CM: M79.7 ICD-9-CM: 729.1 Vitals BP Pulse Temp Resp Height(growth percentile) Weight(growth percentile) 110/78 86 98.1 °F (36.7 °C) (Oral) 16 5' 2\" (1.575 m) 162 lb 12.8 oz (73.8 kg) SpO2 BMI OB Status Smoking Status 98% 29.78 kg/m2 Medically Induced Never Smoker Vitals History BMI and BSA Data Body Mass Index Body Surface Area  
 29.78 kg/m 2 1.8 m 2 Preferred Pharmacy [facility]acy [facility] [facility] [phone] Your Updated Medication List  
  
   
This list is accurate as of 7/31/18  3:38 PM.  Always use your most recent med list.  
  
  
  
  
 benztropine 1 mg tablet Commonly known as:  COGENTIN Take  by mouth two (2) times a day. cyclobenzaprine 5 mg tablet Commonly known as:  FLEXERIL Take 1 Tab by mouth nightly. CYMBALTA 60 mg capsule Generic drug:  DULoxetine Take 60 mg by mouth two (2) times a day. Takes 1 cap in the am and 1 cap at night. dicyclomine 10 mg capsule Commonly known as:  BENTYL TAKE 1 CAPSULE BY MOUTH 4 TIMES A DAY  
  
 doxycycline 100 mg tablet Commonly known as:  ADOXA Take 1 Tab by mouth two (2) times a day for 10 days. ergocalciferol 50,000 unit capsule Commonly known as:  ERGOCALCIFEROL Take 1 Cap by mouth every seven (7) days. Indications: Vitamin D Deficiency  
  
 famotidine 20 mg tablet Commonly known as:  PEPCID Take 1 Tab by mouth two (2) times a day. ibuprofen 100 mg tablet Take 100 mg by mouth every six (6) hours as needed. JUNEL FE 1/20 (28) 1 mg-20 mcg (21)/75 mg (7) Tab Generic drug:  norethindrone-ethinyl estradiol  
  
 lithium carbonate 300 mg capsule TAKE 3 CAPSULES BY MOUTH EVERY MORNING AND 3 CAPSULES EVERY NIGHT AT BEDTIME  
  
 ondansetron 8 mg disintegrating tablet Commonly known as:  ZOFRAN ODT Take 1 Tab by mouth every eight (8) hours as needed for Nausea. topiramate 50 mg tablet Commonly known as:  TOPAMAX TAKE 1 TABLET BY MOUTH IN THE MORNING AND TAKE 2 TABLETS BY MOUTH IN THE EVENING  
  
 VRAYLAR 3 mg capsule Generic drug:  cariprazine Take 4.5 mg by mouth nightly. ZANTAC 75 75 mg tablet Generic drug:  raNITIdine Take 75 mg by mouth daily. Prescriptions Sent to Pharmacy Refills  
 ergocalciferol (ERGOCALCIFEROL) 50,000 unit capsule 3 Sig: Take 1 Cap by mouth every seven (7) days. Indications: Vitamin D Deficiency Class: Normal  
 Pharmacy: [facility] [id number] [place] [facility], [address] Ph #: [phone] Route: Oral  
 doxycycline (ADOXA) 100 mg tablet 0 Sig: Take 1 Tab by mouth two (2) times a day for 10 days.   
 Class: Normal  
 Pharmacy: [facility] [id number] [place] [address] [place] Ph #: [phone] Route: Oral  
 cyclobenzaprine (FLEXERIL) 5 mg tablet 0 Sig: Take 1 Tab by mouth nightly. Class: Normal  
 Pharmacy: [facility] [id number] [place] [address] [place] Ph #: [phone] Route: Oral  
  
Patient Instructions Fibromyalgia: Care Instructions Your Care Instructions Fibromyalgia is a painful condition that is not completely understood by medical experts. The cause of fibromyalgia is not known. It can make you feel tired and ache all over. It causes tender spots at specific points of the body that hurt only when you press on them. You may have trouble sleeping, as well as other symptoms. These problems can upset your work and home life. Symptoms tend to come and go, although they may never go away completely. Fibromyalgia does not harm your muscles, joints, or organs. Follow-up care is a key part of your treatment and safety. Be sure to make and go to all appointments, and call your doctor if you are having problems. It's also a good idea to know your test results and keep a list of the medicines you take. How can you care for yourself at home? · Exercise often. Walk, swim, or bike to help with pain and sleep problems and to make you feel better. · Try to get a good night's sleep. Go to bed and get up at the same time each day, whether you feel rested or not. Make sure you have a good mattress and pillow. · Reduce stress. Avoid things that cause you stress, if you can. If not, work at making them less stressful. Learn to use biofeedback, guided imagery, meditation, or other methods to relax. · Make healthy changes. Eat a balanced diet, quit smoking, and limit alcohol and caffeine. · Use a heating pad set on low or take warm baths or showers for pain. Using cold packs for up to 20 minutes at a time can also relieve pain.  Put a thin cloth between the cold pack and your skin. A gentle massage might help too. · Be safe with medicines. Take your medicines exactly as prescribed. Call your doctor if you think you are having a problem with your medicine. Your doctor may talk to you about taking antidepressant medicines. These medicines may improve sleep, relieve pain, and in some cases treat depression. · Learn about fibromyalgia. This makes coping easier. Then, take an active role in your treatment. · Think about joining a support group with others who have fibromyalgia to learn more and get support. When should you call for help? Watch closely for changes in your health, and be sure to contact your doctor if: 
  · You feel sad, helpless, or hopeless; lose interest in things you used to enjoy; or have other symptoms of depression.  
  · Your fibromyalgia symptoms get worse. Where can you learn more? Go to http://thomas-bryce.info/. Enter V003 in the search box to learn more about \"Fibromyalgia: Care Instructions. \" Current as of: October 9, 2017 Content Version: 11.7 © 3343-4460 [employer]. Care instructions adapted under license by [employer] (which disclaims liability or warranty for this information). If you have questions about a medical condition or this instruction, always ask your healthcare professional. [address] any warranty or liability for your use of this information. Introducing [facility] & HEALTH SERVICES! [employer] introduces Pingboard patient portal. Now you can access parts of your medical record, email your doctor's office, and request medication refills online. 1. In your internet browser, go to [email or web address] 2. Click on the First Time User? Click Here link in the Sign In box. You will see the New Member Sign Up page. 3. Enter your Pingboard Access Code exactly as it appears below.  You will not need to use this code after youve completed the sign-up process. If you do not sign up before the expiration date, you must request a new code. · [employer] Access Code: [email or web address] Expires: 8/1/2018 10:36 AM 
 
4. Enter the last four digits of your Social Security Number (xxxx) and Date of Birth (mm/dd/yyyy) as indicated and click Submit. You will be taken to the next sign-up page. 5. Create a [employer] ID. This will be your [employer] login ID and cannot be changed, so think of one that is secure and easy to remember. 6. Create a [employer] password. You can change your password at any time. 7. Enter your Password Reset Question and Answer. This can be used at a later time if you forget your password. 8. Enter your e-mail address. You will receive e-mail notification when new information is available in [address] 9. Click Sign Up. You can now view and download portions of your medical record. 10. Click the Download Summary menu link to download a portable copy of your medical information. If you have questions, please visit the Frequently Asked Questions section of the [employer] website. Remember, Bio[employer] Systems is NOT to be used for urgent needs. For medical emergencies, dial 911. Now available from your iPhone and Android! Please provide this summary of care documentation to your next provider. Your primary care clinician is listed as Quang SCHNEIDER. If you have any questions after today's visit, please call [phone].

## 2018-07-31 NOTE — PROGRESS NOTES
HISTORY OF PRESENT ILLNESS  Carlos Ventura is a 20 y.o. female. HPI: Patient reports redness and swelling with purulent discharge from her naval for 2-3 days. Swelling  is slowly getting better  She also states that she has been under a lot of stress lately and that her fibromyalgia has been acting up. She has aches and pains all over her boday. Taking her usual medication for FM is not helping. She has called her rheumatologist for appointment  but they haven't called her back. She will stop by the office to get appointment    Filippo maintenance: she is over weight, doesn't watch her diet, doesn't exercise, but she is active during the day    Past Medical History:   Diagnosis Date    Anemia     Anxiety     Arm fracture     Bipolar 1 disorder (HCC)     Depression     Fibromyalgia     IBS (irritable bowel syndrome)    History reviewed. No pertinent surgical history. Allergies   Allergen Reactions    Coconut Rash    Pcn [Penicillins] Hives    Soy Hives     Current Outpatient Prescriptions:     ergocalciferol (ERGOCALCIFEROL) 50,000 unit capsule, Take 1 Cap by mouth every seven (7) days. Indications: Vitamin D Deficiency, Disp: 12 Cap, Rfl: 3    doxycycline (ADOXA) 100 mg tablet, Take 1 Tab by mouth two (2) times a day for 10 days. , Disp: 20 Tab, Rfl: 0    cyclobenzaprine (FLEXERIL) 5 mg tablet, Take 1 Tab by mouth nightly., Disp: 20 Tab, Rfl: 0    benztropine (COGENTIN) 1 mg tablet, Take  by mouth two (2) times a day., Disp: , Rfl:     famotidine (PEPCID) 20 mg tablet, Take 1 Tab by mouth two (2) times a day., Disp: 30 Tab, Rfl: 0    cariprazine (VRAYLAR) 3 mg capsule, Take 4.5 mg by mouth nightly., Disp: , Rfl:     ondansetron (ZOFRAN ODT) 8 mg disintegrating tablet, Take 1 Tab by mouth every eight (8) hours as needed for Nausea., Disp: 20 Tab, Rfl: 0    dicyclomine (BENTYL) 10 mg capsule, TAKE 1 CAPSULE BY MOUTH 4 TIMES A DAY, Disp: 120 Cap, Rfl: 1    lithium carbonate 300 mg capsule, TAKE 3 CAPSULES BY MOUTH EVERY MORNING AND 3 CAPSULES EVERY NIGHT AT BEDTIME, Disp: , Rfl: 0    topiramate (TOPAMAX) 50 mg tablet, TAKE 1 TABLET BY MOUTH IN THE MORNING AND TAKE 2 TABLETS BY MOUTH IN THE EVENING, Disp: , Rfl: 0    JUNEL FE 1/20, 28, 1 mg-20 mcg (21)/75 mg (7) per tablet, , Disp: , Rfl:     DULoxetine (CYMBALTA) 60 mg capsule, Take 60 mg by mouth two (2) times a day. Takes 1 cap in the am and 1 cap at night., Disp: , Rfl:     ibuprofen 100 mg tablet, Take 100 mg by mouth every six (6) hours as needed. , Disp: , Rfl:     ranitidine (ZANTAC 75) 75 mg tablet, Take 75 mg by mouth daily. , Disp: , Rfl:   Review of Systems   Constitutional: Positive for malaise/fatigue. Respiratory: Negative. Cardiovascular: Negative. Gastrointestinal: Negative. Blood pressure 110/78, pulse 86, temperature 98.1 °F (36.7 °C), temperature source Oral, resp. rate 16, height 5' 2\" (1.575 m), weight 162 lb 12.8 oz (73.8 kg), SpO2 98 %. Body mass index is 29.78 kg/(m^2). Physical Exam   HENT:   Mouth/Throat: Oropharynx is clear and moist.   Neck: Normal range of motion. Neck supple. Cardiovascular: Normal rate and regular rhythm. No murmur heard. Pulmonary/Chest: Effort normal and breath sounds normal.   Abdominal: Soft. Bowel sounds are normal.   Skin:   Small erythematous bump on her naval, appear like insect bite   Nursing note and vitals reviewed. ASSESSMENT and PLAN  Diagnoses and all orders for this visit:    1. Skin infection  -     doxycycline (ADOXA) 100 mg tablet; Take 1 Tab by mouth two (2) times a day for 10 days. 2. Low serum vitamin D  -     ergocalciferol (ERGOCALCIFEROL) 50,000 unit capsule; Take 1 Cap by mouth every seven (7) days. Indications: Vitamin D Deficiency    3. Fibromyalgia  -     cyclobenzaprine (FLEXERIL) 5 mg tablet; Take 1 Tab by mouth nightly.     4. Over weight     Normal BMI discussed, advised to watch diet and exercise  Pt was given an after visit summary which includes diagnosis, current medicines and vital and voiced understanding of treatment plan

## 2018-08-04 ENCOUNTER — TELEPHONE (OUTPATIENT)
Dept: FAMILY MEDICINE CLINIC | Age: 23
End: 2018-08-04

## 2018-08-04 NOTE — TELEPHONE ENCOUNTER
Pt called saying doxy making her very nauseated  Reviewed note  Called pt and left message advising her to stop doxy, has had it enough for a small infection  Don't see any surgical history  Wonder if she has small granuloma  At any rate not wanting to give more abx without seeing it.  Could follow up next week if needed

## 2018-08-12 DIAGNOSIS — R10.30 LOWER ABDOMINAL PAIN: ICD-10-CM

## 2018-08-12 RX ORDER — FAMOTIDINE 20 MG/1
TABLET, FILM COATED ORAL
Qty: 30 TAB | Refills: 0 | Status: SHIPPED | OUTPATIENT
Start: 2018-08-12 | End: 2018-08-14 | Stop reason: SDUPTHER

## 2018-08-14 DIAGNOSIS — R10.30 LOWER ABDOMINAL PAIN: ICD-10-CM

## 2018-08-14 RX ORDER — FAMOTIDINE 20 MG/1
TABLET, FILM COATED ORAL
Qty: 30 TAB | Refills: 0 | Status: SHIPPED | OUTPATIENT
Start: 2018-08-14 | End: 2018-09-13 | Stop reason: SDUPTHER

## 2018-09-07 DIAGNOSIS — M79.7 FIBROMYALGIA: ICD-10-CM

## 2018-09-08 RX ORDER — CYCLOBENZAPRINE HCL 5 MG
TABLET ORAL
Qty: 20 TAB | Refills: 0 | Status: SHIPPED | OUTPATIENT
Start: 2018-09-08 | End: 2020-12-31 | Stop reason: ALTCHOICE

## 2018-09-13 DIAGNOSIS — R10.30 LOWER ABDOMINAL PAIN: ICD-10-CM

## 2018-09-14 RX ORDER — FAMOTIDINE 20 MG/1
TABLET, FILM COATED ORAL
Qty: 30 TAB | Refills: 0 | Status: SHIPPED | OUTPATIENT
Start: 2018-09-14 | End: 2018-10-16 | Stop reason: SDUPTHER

## 2018-10-16 DIAGNOSIS — R10.30 LOWER ABDOMINAL PAIN: ICD-10-CM

## 2018-10-16 RX ORDER — FAMOTIDINE 20 MG/1
TABLET, FILM COATED ORAL
Qty: 30 TAB | Refills: 0 | Status: SHIPPED | OUTPATIENT
Start: 2018-10-16 | End: 2018-11-18 | Stop reason: SDUPTHER

## 2018-10-27 DIAGNOSIS — R10.30 LOWER ABDOMINAL PAIN: ICD-10-CM

## 2018-10-29 RX ORDER — FAMOTIDINE 20 MG/1
TABLET, FILM COATED ORAL
Qty: 30 TAB | Refills: 0 | OUTPATIENT
Start: 2018-10-29

## 2018-11-18 DIAGNOSIS — R10.30 LOWER ABDOMINAL PAIN: ICD-10-CM

## 2018-11-19 RX ORDER — FAMOTIDINE 20 MG/1
TABLET, FILM COATED ORAL
Qty: 30 TAB | Refills: 0 | Status: SHIPPED | OUTPATIENT
Start: 2018-11-19 | End: 2019-01-02 | Stop reason: SDUPTHER

## 2019-01-02 DIAGNOSIS — R10.30 LOWER ABDOMINAL PAIN: ICD-10-CM

## 2019-01-02 RX ORDER — FAMOTIDINE 20 MG/1
TABLET, FILM COATED ORAL
Qty: 30 TAB | Refills: 0 | Status: SHIPPED | OUTPATIENT
Start: 2019-01-02 | End: 2019-01-17 | Stop reason: SDUPTHER

## 2019-01-03 ENCOUNTER — OFFICE VISIT (OUTPATIENT)
Dept: FAMILY MEDICINE CLINIC | Age: 24
End: 2019-01-03

## 2019-01-03 VITALS
DIASTOLIC BLOOD PRESSURE: 84 MMHG | HEIGHT: 62 IN | BODY MASS INDEX: 31.21 KG/M2 | RESPIRATION RATE: 16 BRPM | WEIGHT: 169.6 LBS | HEART RATE: 90 BPM | SYSTOLIC BLOOD PRESSURE: 128 MMHG | OXYGEN SATURATION: 99 % | TEMPERATURE: 98.1 F

## 2019-01-03 DIAGNOSIS — J06.9 UPPER RESPIRATORY TRACT INFECTION, UNSPECIFIED TYPE: Primary | ICD-10-CM

## 2019-01-03 RX ORDER — AZITHROMYCIN 250 MG/1
TABLET, FILM COATED ORAL
Qty: 6 TAB | Refills: 0 | Status: SHIPPED | OUTPATIENT
Start: 2019-01-03 | End: 2019-01-08

## 2019-01-03 NOTE — PROGRESS NOTES
Chief Complaint   Patient presents with    Cough     For about 1 week, coughing up greenish Phlegm.  Sore Throat     Per Pt, may be from coughing so much. 1. Have you been to the ER, urgent care clinic since your last visit? Hospitalized since your last visit? No    2. Have you seen or consulted any other health care providers outside of the [address] [place] [address] since your last visit? Include any pap smears or colon screening. Yes .

## 2019-01-03 NOTE — PROGRESS NOTES
HISTORY OF PRESENT ILLNESS  Phan More is a 20 y.o. female. HPI  C/o cough and chest congestion x 6 days. Feeling feverish with chills. Taking nyquil at bedtime with some sx relief. Past medical history, social history, family history and medications were reviewed and updated. Blood pressure 128/84, pulse 90, temperature 98.1 °F (36.7 °C), temperature source Oral, resp. rate 16, height 5' 2\" (1.575 m), weight 169 lb 9.6 oz (76.9 kg), SpO2 99 %. Review of Systems   Constitutional: Positive for chills and malaise/fatigue. Negative for fever. HENT: Positive for congestion and sore throat. Negative for ear pain and sinus pain. Respiratory: Positive for cough. Negative for sputum production, shortness of breath and wheezing. Cardiovascular: Negative for chest pain. All other systems reviewed and are negative. Physical Exam   Constitutional:   overweight   HENT:   Right Ear: Tympanic membrane and ear canal normal.   Left Ear: Tympanic membrane and ear canal normal.   Nose: Mucosal edema present. Right sinus exhibits no maxillary sinus tenderness and no frontal sinus tenderness. Left sinus exhibits no maxillary sinus tenderness and no frontal sinus tenderness. Mouth/Throat: Posterior oropharyngeal erythema present. No oropharyngeal exudate or posterior oropharyngeal edema. Neck: Neck supple. Cardiovascular: Normal rate, regular rhythm and normal heart sounds. Pulmonary/Chest: Effort normal and breath sounds normal. She has no wheezes. Lymphadenopathy:     She has no cervical adenopathy. Skin: Skin is warm and dry. ASSESSMENT and PLAN  Diagnoses and all orders for this visit:    1. Upper respiratory tract infection, unspecified type  -     azithromycin (ZITHROMAX) 250 mg tablet; Take 2 tablets today, then take 1 tablet daily  Rest and fluids. Add mucinex DM bid as directed. Begin z pack INI over next 48-72. Follow up prn if sx worsen or FTI.     2. BMI 31.0-31.9,adult  Reviewed healthy diet and exercise recommendations.

## 2019-01-17 DIAGNOSIS — R10.30 LOWER ABDOMINAL PAIN: ICD-10-CM

## 2019-01-18 RX ORDER — FAMOTIDINE 20 MG/1
TABLET, FILM COATED ORAL
Qty: 30 TAB | Refills: 0 | Status: SHIPPED | OUTPATIENT
Start: 2019-01-18 | End: 2019-02-02 | Stop reason: SDUPTHER

## 2019-02-02 DIAGNOSIS — R10.30 LOWER ABDOMINAL PAIN: ICD-10-CM

## 2019-02-04 RX ORDER — FAMOTIDINE 20 MG/1
TABLET, FILM COATED ORAL
Qty: 30 TAB | Refills: 0 | Status: SHIPPED | OUTPATIENT
Start: 2019-02-04 | End: 2019-03-10 | Stop reason: SDUPTHER

## 2019-03-10 DIAGNOSIS — R10.30 LOWER ABDOMINAL PAIN: ICD-10-CM

## 2019-03-11 RX ORDER — FAMOTIDINE 20 MG/1
TABLET, FILM COATED ORAL
Qty: 30 TAB | Refills: 0 | Status: SHIPPED | OUTPATIENT
Start: 2019-03-11 | End: 2019-03-22 | Stop reason: SDUPTHER

## 2019-03-22 DIAGNOSIS — R10.30 LOWER ABDOMINAL PAIN: ICD-10-CM

## 2019-03-24 RX ORDER — FAMOTIDINE 20 MG/1
TABLET, FILM COATED ORAL
Qty: 30 TAB | Refills: 0 | Status: SHIPPED | OUTPATIENT
Start: 2019-03-24 | End: 2019-04-28 | Stop reason: SDUPTHER

## 2019-04-22 RX ORDER — DICYCLOMINE HYDROCHLORIDE 10 MG/1
CAPSULE ORAL
Qty: 120 CAP | Refills: 0 | Status: SHIPPED | OUTPATIENT
Start: 2019-04-22 | End: 2020-06-03 | Stop reason: SDUPTHER

## 2019-04-23 RX ORDER — DICYCLOMINE HYDROCHLORIDE 10 MG/1
CAPSULE ORAL
Qty: 120 CAP | Refills: 0 | Status: SHIPPED | OUTPATIENT
Start: 2019-04-23 | End: 2019-04-29 | Stop reason: SDUPTHER

## 2019-04-28 DIAGNOSIS — R10.30 LOWER ABDOMINAL PAIN: ICD-10-CM

## 2019-04-29 ENCOUNTER — TELEPHONE (OUTPATIENT)
Dept: FAMILY MEDICINE CLINIC | Age: 24
End: 2019-04-29

## 2019-04-29 RX ORDER — FAMOTIDINE 20 MG/1
TABLET, FILM COATED ORAL
Qty: 30 TAB | Refills: 0 | Status: SHIPPED | OUTPATIENT
Start: 2019-04-29 | End: 2019-06-07 | Stop reason: SDUPTHER

## 2019-04-29 RX ORDER — DICYCLOMINE HYDROCHLORIDE 10 MG/1
CAPSULE ORAL
Qty: 120 CAP | Refills: 0 | Status: CANCELLED | OUTPATIENT
Start: 2019-04-29

## 2019-04-29 RX ORDER — DICYCLOMINE HYDROCHLORIDE 10 MG/1
10 CAPSULE ORAL 4 TIMES DAILY
Qty: 120 CAP | Refills: 0 | Status: SHIPPED | OUTPATIENT
Start: 2019-04-29 | End: 2019-05-08 | Stop reason: SDUPTHER

## 2019-04-29 NOTE — TELEPHONE ENCOUNTER
Patient is calling stating that she really needs her medicine, pt is getting nauseous. I see under the medication it says \"transmission to pharmacy failed\". dicyclomine (BENTYL) 10 mg capsule       Best callback:  [phone]      . Pharmacy verified      LOV:  Thursday, January 03, 2019

## 2019-05-08 ENCOUNTER — OFFICE VISIT (OUTPATIENT)
Dept: FAMILY MEDICINE CLINIC | Age: 24
End: 2019-05-08

## 2019-05-08 VITALS
OXYGEN SATURATION: 98 % | RESPIRATION RATE: 18 BRPM | HEART RATE: 91 BPM | DIASTOLIC BLOOD PRESSURE: 78 MMHG | SYSTOLIC BLOOD PRESSURE: 106 MMHG | WEIGHT: 167 LBS | BODY MASS INDEX: 30.73 KG/M2 | TEMPERATURE: 98 F | HEIGHT: 62 IN

## 2019-05-08 DIAGNOSIS — R19.7 DIARRHEA, UNSPECIFIED TYPE: Primary | ICD-10-CM

## 2019-05-08 DIAGNOSIS — R11.0 NAUSEA: ICD-10-CM

## 2019-05-08 DIAGNOSIS — F31.9 BIPOLAR 1 DISORDER (HCC): ICD-10-CM

## 2019-05-08 RX ORDER — ONDANSETRON 8 MG/1
8 TABLET, ORALLY DISINTEGRATING ORAL
Qty: 20 TAB | Refills: 0 | Status: SHIPPED | OUTPATIENT
Start: 2019-05-08 | End: 2020-12-31

## 2019-05-08 RX ORDER — LOPERAMIDE HYDROCHLORIDE 2 MG/1
2 CAPSULE ORAL
Qty: 20 CAP | Refills: 0 | Status: SHIPPED | OUTPATIENT
Start: 2019-05-08 | End: 2019-05-18

## 2019-05-08 NOTE — PROGRESS NOTES
HISTORY OF PRESENT ILLNESS  Issac Vogel is a 30 y.o. female. HPI: Patient is complaining of nausea, mild abdominal tenderness, diarrhea and  headache x 3 days. She is able to drink and eat but she has diarrhea as soon as she eats. She has not taking anything for diarrhea or nausea. She has history of bipolar diseases and depression, she is followed by a psychiatrist, but she  requesting another psychiatrist.   Past Medical History:   Diagnosis Date    Anemia     Anxiety     Arm fracture     Bipolar 1 disorder ([address]     Depression     Fibromyalgia     IBS (irritable bowel syndrome)    History reviewed. No pertinent surgical history. Allergies   Allergen Reactions    Coconut Rash    Pcn [Penicillins] Hives    Soy Hives     Current Outpatient Medications:     loperamide (IMODIUM A-D) 2 mg capsule, Take 1 Cap by mouth four (4) times daily as needed for Diarrhea for up to 10 days. , Disp: 20 Cap, Rfl: 0    ondansetron (ZOFRAN ODT) 8 mg disintegrating tablet, Take 1 Tab by mouth every eight (8) hours as needed for Nausea., Disp: 20 Tab, Rfl: 0    famotidine (PEPCID) 20 mg tablet, TAKE 1 TABLET BY MOUTH TWICE A DAY, Disp: 30 Tab, Rfl: 0    dicyclomine (BENTYL) 10 mg capsule, TAKE 1 CAPSULE BY MOUTH 4 TIMES A DAY, Disp: 120 Cap, Rfl: 0    cyclobenzaprine (FLEXERIL) 5 mg tablet, TAKE 1 TABLET BY MOUTH EVERY DAY AT NIGHT, Disp: 20 Tab, Rfl: 0    ergocalciferol (ERGOCALCIFEROL) 50,000 unit capsule, Take 1 Cap by mouth every seven (7) days.  Indications: Vitamin D Deficiency, Disp: 12 Cap, Rfl: 3    benztropine (COGENTIN) 1 mg tablet, Take  by mouth two (2) times a day., Disp: , Rfl:     cariprazine (VRAYLAR) 3 mg capsule, Take 4.5 mg by mouth nightly., Disp: , Rfl:     lithium carbonate 300 mg capsule, TAKE 3 CAPSULES BY MOUTH EVERY MORNING AND 3 CAPSULES EVERY NIGHT AT BEDTIME, Disp: , Rfl: 0    topiramate (TOPAMAX) 50 mg tablet, TAKE 1 TABLET BY MOUTH IN THE MORNING AND TAKE 2 TABLETS BY MOUTH IN THE EVENING, Disp: , Rfl: 0    JUNEL FE 1/20, 28, 1 mg-20 mcg (21)/75 mg (7) per tablet, , Disp: , Rfl:     DULoxetine (CYMBALTA) 60 mg capsule, Take 60 mg by mouth two (2) times a day. Takes 1 cap in the am and 1 cap at night., Disp: , Rfl:     ibuprofen 100 mg tablet, Take 100 mg by mouth every six (6) hours as needed. , Disp: , Rfl:     Review of Systems   Constitutional: Negative. Respiratory: Negative. Cardiovascular: Negative. Gastrointestinal: Positive for abdominal pain, diarrhea and nausea. Negative for blood in stool, constipation, melena and vomiting. Psychiatric/Behavioral: Positive for depression. Blood pressure 106/78, pulse 91, temperature 98 °F (36.7 °C), temperature source Oral, resp. rate 18, height 5' 2\" (1.575 m), weight 167 lb (75.8 kg), SpO2 98 %. Physical Exam   Constitutional: No distress. HENT:   Mouth/Throat: Oropharynx is clear and moist.   Neck: Normal range of motion. Neck supple. Cardiovascular: Normal rate and regular rhythm. No murmur heard. Pulmonary/Chest: Effort normal and breath sounds normal.   Abdominal: Soft. Bowel sounds are normal. She exhibits no distension and no mass. There is tenderness. There is no rebound and no guarding. Mild generalised abdominal tenderness in palpation   Psychiatric: She has a normal mood and affect. Her behavior is normal.   Nursing note and vitals reviewed. ASSESSMENT and PLAN  Diagnoses and all orders for this visit:    1. Diarrhea, unspecified type  -     loperamide (IMODIUM A-D) 2 mg capsule; Take 1 Cap by mouth four (4) times daily as needed for Diarrhea for up to 10 days.        -     Fluids   2. Nausea  -     ondansetron (ZOFRAN ODT) 8 mg disintegrating tablet; Take 1 Tab by mouth every eight (8) hours as needed for Nausea.     3. Bipolar 1 disorder ([facility]  -     REFERRAL TO PSYCHIATRY  Pt was given an after visit summary which includes diagnosis, current medicines and vital and voiced understanding of treatment plan

## 2019-05-08 NOTE — PROGRESS NOTES
Chief Complaint   Patient presents with    Headache     x 4 days     Diarrhea     x 4 days    Nausea     x 4 days      1. Have you been to the ER, urgent care clinic since your last visit? Hospitalized since your last visit? No    2. Have you seen or consulted any other health care providers outside of the [address] [place] [address] since your last visit? Include any pap smears or colon screening.  No

## 2019-05-08 NOTE — PATIENT INSTRUCTIONS
Diarrhea: Care Instructions  Your Care Instructions    Diarrhea is loose, watery stools (bowel movements). The exact cause is often hard to find. Sometimes diarrhea is your body's way of getting rid of what caused an upset stomach. Viruses, food poisoning, and many medicines can cause diarrhea. Some people get diarrhea in response to emotional stress, anxiety, or certain foods. Almost everyone has diarrhea now and then. It usually isn't serious, and your stools will return to normal soon. The important thing to do is replace the fluids you have lost, so you can prevent dehydration. The doctor has checked you carefully, but problems can develop later. If you notice any problems or new symptoms, get medical treatment right away. Follow-up care is a key part of your treatment and safety. Be sure to make and go to all appointments, and call your doctor if you are having problems. It's also a good idea to know your test results and keep a list of the medicines you take. How can you care for yourself at home? · Watch for signs of dehydration, which means your body has lost too much water. Dehydration is a serious condition and should be treated right away. Signs of dehydration are:  ? Increasing thirst and dry eyes and mouth. ? Feeling faint or lightheaded. ? Darker urine, and a smaller amount of urine than normal.  · To prevent dehydration, drink plenty of fluids, enough so that your urine is light yellow or clear like water. Choose water and other caffeine-free clear liquids until you feel better. If you have kidney, heart, or liver disease and have to limit fluids, talk with your doctor before you increase the amount of fluids you drink. · Begin eating small amounts of mild foods the next day, if you feel like it. ? Try yogurt that has live cultures of Lactobacillus. (Check the label.)  ? Avoid spicy foods, fruits, alcohol, and caffeine until 48 hours after all symptoms are gone. ?  Avoid chewing gum that contains sorbitol. ? Avoid dairy products (except for yogurt with Lactobacillus) while you have diarrhea and for 3 days after symptoms are gone. · The doctor may recommend that you take over-the-counter medicine, such as loperamide (Imodium), if you still have diarrhea after 6 hours. Read and follow all instructions on the label. Do not use this medicine if you have bloody diarrhea, a high fever, or other signs of serious illness. Call your doctor if you think you are having a problem with your medicine. When should you call for help? Call 911 anytime you think you may need emergency care. For example, call if:    · You passed out (lost consciousness).     · Your stools are maroon or very bloody.    Call your doctor now or seek immediate medical care if:    · You are dizzy or lightheaded, or you feel like you may faint.     · Your stools are black and look like tar, or they have streaks of blood.     · You have new or worse belly pain.     · You have symptoms of dehydration, such as:  ? Dry eyes and a dry mouth. ? Passing only a little dark urine. ? Feeling thirstier than usual.     · You have a new or higher fever.    Watch closely for changes in your health, and be sure to contact your doctor if:    · Your diarrhea is getting worse.     · You see pus in the diarrhea.     · You are not getting better after 2 days (48 hours). Where can you learn more? Go to http://thomas-bryce.info/. Enter K878 in the search box to learn more about \"Diarrhea: Care Instructions. \"  Current as of: September 23, 2018  Content Version: 11.9  © 5985-7160 [employer]. Care instructions adapted under license by [employer] (which disclaims liability or warranty for this information).  If you have questions about a medical condition or this instruction, always ask your healthcare professional. [place] [address] any warranty or liability for your use of this information.

## 2019-06-07 DIAGNOSIS — R10.30 LOWER ABDOMINAL PAIN: ICD-10-CM

## 2019-06-07 RX ORDER — FAMOTIDINE 20 MG/1
TABLET, FILM COATED ORAL
Qty: 30 TAB | Refills: 5 | Status: SHIPPED | OUTPATIENT
Start: 2019-06-07 | End: 2020-12-31

## 2019-06-07 NOTE — TELEPHONE ENCOUNTER
----- Message from Toya Salcido sent at 6/7/2019 12:37 PM EDT -----  Regarding: DENI Armendariz/Refill  Pt is requesting refill on famotidine (PEPCID) 20 mg tablet sent to [facility] pharmacy [phone]. Best contact number is [phone].

## 2019-10-28 ENCOUNTER — TELEPHONE (OUTPATIENT)
Dept: FAMILY MEDICINE CLINIC | Age: 24
End: 2019-10-28

## 2019-10-28 NOTE — TELEPHONE ENCOUNTER
Has a list from insurance. Most are not taking new pts and can't get in with anyone.   Wants advised on what to do

## 2019-10-28 NOTE — TELEPHONE ENCOUNTER
Patient is calling stating that she has been calling around for the psychiatrist who takes Medicaid and there are none. She has couple more days till she runs out of her antipsychotic drugs, requesting call back to what to do.     [facility]# [phone]

## 2019-10-28 NOTE — TELEPHONE ENCOUNTER
----- Message from Ulysses Rei sent at 10/28/2019  4:52 PM EDT -----  Regarding: [facility] [place]/ Telephone  Patient return call    Caller's first and last name and relationship (if not the patient):      Best contact number(s):  [phone]    Whose call is being returned:  Jhonatan Jairon    Details to clarify the request:  Pt states it was in regards to a physiatrist. Pt has a week of medication left.     Ulysses Rei

## 2019-10-28 NOTE — TELEPHONE ENCOUNTER
DENI Ramirez LPN   Caller: Unspecified (Today,  2:03 PM)             [facility]      [facility]

## 2019-10-31 ENCOUNTER — TELEPHONE (OUTPATIENT)
Dept: FAMILY MEDICINE CLINIC | Age: 24
End: 2019-10-31

## 2019-10-31 NOTE — TELEPHONE ENCOUNTER
Patient is calling stating that she is in [facility] and they do not do medication management . She only has medication for one more day. She can call the [facility] but there apt is not until mid February 2020. She is asking [facility] to prescribe her medication until min feb.     Seroquel 150mgs  Lithium 200mgs twice daily  Topamax 100mgs twice daily  Cymbalta 60mgs twice daily        Pharmacy verified   LOV : May 08, 2019 10:30 AM   Requetsing call back [facility]# [phone]

## 2019-11-01 RX ORDER — DULOXETIN HYDROCHLORIDE 60 MG/1
60 CAPSULE, DELAYED RELEASE ORAL 2 TIMES DAILY
Qty: 60 CAP | Refills: 2 | Status: SHIPPED | OUTPATIENT
Start: 2019-11-01

## 2019-11-01 RX ORDER — TOPIRAMATE 50 MG/1
100 TABLET, FILM COATED ORAL 2 TIMES DAILY
Qty: 60 TAB | Refills: 2 | Status: SHIPPED | OUTPATIENT
Start: 2019-11-01 | End: 2019-11-05 | Stop reason: SDUPTHER

## 2019-11-01 RX ORDER — LITHIUM CARBONATE 300 MG/1
300 CAPSULE ORAL 2 TIMES DAILY WITH MEALS
Qty: 60 CAP | Refills: 2 | Status: ON HOLD | OUTPATIENT
Start: 2019-11-01 | End: 2021-07-14 | Stop reason: DRUGHIGH

## 2019-11-01 NOTE — TELEPHONE ENCOUNTER
There is no seroquel in her medication list, she needs to call her pharmacy to send me the medication with dosage   She also need to find a psychiatrist, no psychologist . She should call Maxim Reyes-- she is taking new patients-- it will take time for appointment and  I will refill her medications for 2-3 months until she makes appointment with a psychiatrist

## 2019-11-01 NOTE — TELEPHONE ENCOUNTER
----- Message from [employer] sent at 11/1/2019  2:30 PM EDT -----  Regarding: Tanesha/Telephone   General Message/Vendor Calls    Caller's first and last name:Pt       Reason for call:Rx      Callback required yes/no and why:Yes      Best contact number(s):[phone]      Details to clarify the request:Pt requesting a call back regarding seeing if the provider can refill the \"Seroquel\" Rx due to the [address] [place] [address] [facility] was not able to fill the Rx. Pt stated this is her 2nd attempt and she's currently out of the Rx. Pt stated if no answer;please leave a detail message.       Candice A Joella Severance

## 2019-11-04 RX ORDER — QUETIAPINE FUMARATE 100 MG/1
TABLET, FILM COATED ORAL
Refills: 0 | COMMUNITY
Start: 2019-09-17 | End: 2019-11-04 | Stop reason: SDUPTHER

## 2019-11-04 RX ORDER — QUETIAPINE FUMARATE 100 MG/1
TABLET, FILM COATED ORAL
Qty: 45 TAB | Refills: 2 | Status: SHIPPED | OUTPATIENT
Start: 2019-11-04

## 2019-11-04 NOTE — TELEPHONE ENCOUNTER
----- Message from Liza Hatchet sent at 11/1/2019  6:37 PM EDT -----  Regarding: Marcello CHEEMA/Refill  URGENT  Medication Refill    Caller (if not patient):  Yumiko Flynn      Relationship of caller (if not patient):  Self      Best contact number(s):  [phone]      Name of medication and dosage if known:  Seraquel      Is patient out of this medication (yes/no):  Yes      Pharmacy name:  [facility]    Pharmacy listed in chart? (yes/no):  Yes  Pharmacy phone number:      Details to clarify the request:  Pt states she was given the number on 11/01/19 to call psychiatrist Dr. Becky Casanova office to make an appointment, the practice is not open on Friday's and the Pt is upset that she does not have enough medication to last her through the weekend. Pt states she can't go without her medication, as it would be a big problem for her. Pt is requesting for a refill to be sent to the [facility] at least until she can get an appointment to see Dr. Ivan Szymanski or whomever can see her sooner rather than later.     Thanks,  Liza Hatchet

## 2019-11-04 NOTE — TELEPHONE ENCOUNTER
----- Message from [employer] sent at 11/4/2019 10:48 AM EST -----  Regarding: Luis Martínez / Refill  Patient has been talking to nurse Betsey Turcios because she can't find a psychiatrist taking new Medicaid patients. Patient has scheduled with Dr. Sergio Dowd in March and needs refill on Seroquel at [address] on file. Rx is 100mg, and patient takes 1.5 tabs nightly. Ivory stated DENI Armendariz can call to verify that patient is taking Rx. Patient received a few pills from pharmacy to hold her over. She can be reached at [phone].

## 2019-11-05 RX ORDER — TOPIRAMATE 50 MG/1
100 TABLET, FILM COATED ORAL 2 TIMES DAILY
Qty: 120 TAB | Refills: 2 | Status: SHIPPED | OUTPATIENT
Start: 2019-11-05 | End: 2020-12-31 | Stop reason: SDUPTHER

## 2019-11-05 RX ORDER — TOPIRAMATE 50 MG/1
100 TABLET, FILM COATED ORAL 2 TIMES DAILY
Qty: 60 TAB | Refills: 2 | Status: CANCELLED | OUTPATIENT
Start: 2019-11-05

## 2019-11-05 NOTE — TELEPHONE ENCOUNTER
Pharmacy needs clarification on topamax. Directions are 2 bid but only was given 60 pills .  Would like rx changed for 30 days

## 2020-04-28 ENCOUNTER — VIRTUAL VISIT (OUTPATIENT)
Dept: FAMILY MEDICINE CLINIC | Age: 25
End: 2020-04-28

## 2020-04-28 DIAGNOSIS — K13.0 ANGULAR CHEILITIS: Primary | ICD-10-CM

## 2020-04-28 RX ORDER — MUPIROCIN 20 MG/G
OINTMENT TOPICAL DAILY
Qty: 30 G | Refills: 1 | Status: SHIPPED | OUTPATIENT
Start: 2020-04-28 | End: 2021-04-27

## 2020-04-28 NOTE — PROGRESS NOTES
Lindsey rigo Reliance  30 y.o. female  1995  [address] [id number]  [id number]     DELANE[address]       Encounter Date: 4/28/2020           Established Patient Visit Note: Elver Christensen NP    Reason for Appointment:  Chief Complaint   Patient presents with    Skin Problem       History of Present Illness:  History provided by patient    Ziyad Rossi is a 30 y.o. female who presents today for painful, dry cracked in the corner of lips. She states that her endocrinologist checked her labs and she was low on vitamin B complex. She is taking vitamin B supplement daily . She denies having Sjogren syndrome, celiac diease or crohn's diease        Review of Systems  Review of Systems   Constitutional: Negative. HENT:        Dry, painful skin in the corner of her month   Respiratory: Negative. Cardiovascular: Negative. Gastrointestinal: Negative. Allergies: Coconut; Pcn [penicillins]; and Soy    Medications: (Updated to reflect final medication list after visit)    Current Outpatient Medications:     mupirocin (BACTROBAN) 2 % ointment, Apply  to affected area daily. , Disp: 30 g, Rfl: 1    topiramate (TOPAMAX) 50 mg tablet, Take 2 Tabs by mouth two (2) times a day., Disp: 120 Tab, Rfl: 2    QUEtiapine (SEROQUEL) 100 mg tablet, TAKE 1 AND 1/2 TABLET BY MOUTH EVERY EVENING, Disp: 45 Tab, Rfl: 2    DULoxetine (CYMBALTA) 60 mg capsule, Take 1 Cap by mouth two (2) times a day. Takes 1 cap in the am and 1 cap at night., Disp: 60 Cap, Rfl: 2    lithium carbonate 300 mg capsule, Take 1 Cap by mouth two (2) times daily (with meals). , Disp: 60 Cap, Rfl: 2    famotidine (PEPCID) 20 mg tablet, TAKE 1 TABLET BY MOUTH TWICE A DAY, Disp: 30 Tab, Rfl: 5    ondansetron (ZOFRAN ODT) 8 mg disintegrating tablet, Take 1 Tab by mouth every eight (8) hours as needed for Nausea., Disp: 20 Tab, Rfl: 0    dicyclomine (BENTYL) 10 mg capsule, TAKE 1 CAPSULE BY MOUTH 4 TIMES A DAY, Disp: 120 Cap, Rfl: 0    cyclobenzaprine (FLEXERIL) 5 mg tablet, TAKE 1 TABLET BY MOUTH EVERY DAY AT NIGHT, Disp: 20 Tab, Rfl: 0    ergocalciferol (ERGOCALCIFEROL) 50,000 unit capsule, Take 1 Cap by mouth every seven (7) days. Indications: Vitamin D Deficiency, Disp: 12 Cap, Rfl: 3    benztropine (COGENTIN) 1 mg tablet, Take  by mouth two (2) times a day., Disp: , Rfl:     cariprazine (VRAYLAR) 3 mg capsule, Take 4.5 mg by mouth nightly., Disp: , Rfl:     JUNEL FE 1/20, 28, 1 mg-20 mcg (21)/75 mg (7) per tablet, , Disp: , Rfl:     ibuprofen 100 mg tablet, Take 100 mg by mouth every six (6) hours as needed. , Disp: , Rfl:     History  Patient Care Team:  Kalee Knott NP as PCP - General (Family Practice)  Kalee Knott NP as PCP - [facility] Provider  Lourdes Hamilton NP as Nurse Practitioner (Nurse Practitioner)    Past Medical History: she has a past medical history of Anemia, Anxiety, Arm fracture, Bipolar 1 disorder ([address] Depression, Fibromyalgia, and IBS (irritable bowel syndrome). Past Surgical History: she has no past surgical history on file. Family Medical History: family history includes Cancer in her maternal grandfather and paternal grandfather; Depression in her father, maternal grandmother, and mother; Diabetes in her maternal grandfather; Heart Disease in her maternal grandmother; Hypertension in her maternal grandfather; Stroke in her maternal grandfather. Social History: she reports that she has never smoked. She has never used smokeless tobacco. She reports current alcohol use. She reports that she does not use drugs. No specialty comments available. Objective:   Vital Signs  Unable to obtain vital signs today as patient does not have equipment for this at home    Physical Exam  Constitutional:       Appearance: Normal appearance.    HENT:      Mouth/Throat:      Comments: Skin in corners of mouths is dry, red and cracked   Neurological:      Mental Status: She is alert and oriented to person, place, and time. Psychiatric:         Mood and Affect: Mood normal.         Thought Content: Thought content normal.         Assessment & Plan:    1. Angular cheilitis    - mupirocin (BACTROBAN) 2 % ointment; Apply  to affected area daily. Dispense: 30 g; Refill: 1  Advised to take biotin daily    I was in the office while conducting this encounter. Consent:  She and/or her healthcare decision maker is aware that this patient-initiated Telehealth encounter is a billable service, with coverage as determined by her insurance carrier. She is aware that she may receive a bill and has provided verbal consent to proceed: Yes    This virtual visit was conducted via [email or web address]. Pursuant to the emergency declaration under the [facility], [facility] waiver authority and the "[employer] and [employer] Act, this Virtual  Visit was conducted to reduce the patient's risk of exposure to COVID-19 and provide continuity of care for an established patient. Services were provided through a video synchronous discussion virtually to substitute for in-person clinic visit. Due to this being a TeleHealth evaluation, many elements of the physical examination are unable to be assessed. Total Time: minutes: 11-20 minutes. I have discussed the diagnosis with the patient and the intended plan as seen in the above orders. The patient has received an after-visit summary along with patient information handout. I have discussed medication side effects and warnings with the patient as well.     Disposition    Will call back if not improved,, will check for Sjogren, and celiac disease     Burgess Maribell NP

## 2020-05-07 ENCOUNTER — TELEPHONE (OUTPATIENT)
Dept: FAMILY MEDICINE CLINIC | Age: 25
End: 2020-05-07

## 2020-05-07 NOTE — TELEPHONE ENCOUNTER
[phone] notified patient via private VM Bactroban can use it daily as needed and if she has question to call me back

## 2020-05-07 NOTE — TELEPHONE ENCOUNTER
----- Message from Suzanna Feliciano sent at 5/6/2020  1:14 PM EDT -----  Regarding: DENI, Marcello/Telephone  Env General Message/Vendor Calls    Caller's first and last name:      Reason for call: Regarding the medicated ointment for the dry cracking by her mouth if she could use it for more than 5 days.        Call back required yes/no and why: Yes       Best contact number(s): [phone]      Details to clarify the request:      Suzannacorey Whitmanjose    Pt was given mupirocin (BACTROBAN) 2 % ointment [[id number]]     On 4/28/20

## 2020-05-26 ENCOUNTER — OFFICE VISIT (OUTPATIENT)
Dept: PRIMARY CARE CLINIC | Age: 25
End: 2020-05-26

## 2020-05-26 VITALS — OXYGEN SATURATION: 99 % | TEMPERATURE: 99.1 F | HEART RATE: 104 BPM

## 2020-05-26 DIAGNOSIS — Z20.822 SUSPECTED COVID-19 VIRUS INFECTION: ICD-10-CM

## 2020-05-26 DIAGNOSIS — R19.7 DIARRHEA, UNSPECIFIED TYPE: ICD-10-CM

## 2020-05-26 DIAGNOSIS — M94.0 COSTOCHONDRITIS, ACUTE: Primary | ICD-10-CM

## 2020-05-26 DIAGNOSIS — J20.9 ACUTE BRONCHITIS, UNSPECIFIED ORGANISM: ICD-10-CM

## 2020-05-26 RX ORDER — AZITHROMYCIN 250 MG/1
TABLET, FILM COATED ORAL
Qty: 6 TAB | Refills: 0 | Status: SHIPPED | OUTPATIENT
Start: 2020-05-26 | End: 2020-05-31

## 2020-05-26 RX ORDER — NAPROXEN 375 MG/1
375 TABLET ORAL 2 TIMES DAILY WITH MEALS
Qty: 30 TAB | Refills: 0 | Status: SHIPPED | OUTPATIENT
Start: 2020-05-26 | End: 2021-02-26

## 2020-05-26 NOTE — PROGRESS NOTES
HISTORY OF PRESENT ILLNESS  Rufina Nogueira is a 30 y.o. female. seen in flu clinic along with mom. HPI  This patient was seen in drive through Flu Clinic at [facility] while in their vehicle due to COVID-19 pandemic with PPE and focused examination in order to decrease community viral transmission. Please seen scanned form for visit documentation. Cough,fever,CP x 3 days accompanied by SOB. No known exposure    Review of Systems   Constitutional: Positive for chills and fever. Negative for malaise/fatigue. HENT: Positive for sore throat. Negative for congestion, ear pain and tinnitus. Eyes: Negative for blurred vision, double vision, pain and discharge. Respiratory: Positive for cough. Negative for shortness of breath and wheezing. Cardiovascular: Positive for chest pain. Negative for palpitations and leg swelling. Gastrointestinal: Negative for abdominal pain, blood in stool, constipation, diarrhea, nausea and vomiting. Genitourinary: Negative for dysuria, frequency, hematuria and urgency. Musculoskeletal: Negative for back pain, joint pain and myalgias. Skin: Negative for rash. Neurological: Negative for dizziness, tremors, seizures and headaches. Endo/Heme/Allergies: Negative for polydipsia. Does not bruise/bleed easily. Psychiatric/Behavioral: Negative for depression and substance abuse. The patient is not nervous/anxious. Physical Exam  Vitals signs and nursing note reviewed. Constitutional:       General: She is not in acute distress. HENT:      Right Ear: Tympanic membrane normal. No decreased hearing noted. No drainage. No middle ear effusion. Tympanic membrane is not injected. Left Ear: Tympanic membrane normal. No decreased hearing noted. No drainage. No middle ear effusion. Tympanic membrane is not injected. Nose: Mucosal edema present. No rhinorrhea. Right Sinus: Maxillary sinus tenderness and frontal sinus tenderness present.       Left Sinus: Maxillary sinus tenderness and frontal sinus tenderness present. Mouth/Throat:      Pharynx: Uvula midline. Posterior oropharyngeal erythema present. No oropharyngeal exudate. Cardiovascular:      Rate and Rhythm: Regular rhythm. Heart sounds: Normal heart sounds. No murmur. Pulmonary:      Effort: Pulmonary effort is normal.      Breath sounds: Normal breath sounds. No wheezing or rales. Chest:       Lymphadenopathy:      Head:      Right side of head: No tonsillar adenopathy. Left side of head: No tonsillar adenopathy. Cervical: No cervical adenopathy. ASSESSMENT and PLAN  Diagnoses and all orders for this visit:    1. Costochondritis, acute  -     naproxen (NAPROSYN) 375 mg tablet; Take 1 Tab by mouth two (2) times daily (with meals). 2. Acute bronchitis, unspecified organism  -     azithromycin (ZITHROMAX) 250 mg tablet; Take 2 tablets today, then take 1 tablet daily  -     NOVEL CORONAVIRUS (COVID-19); Future    3. Diarrhea, unspecified type    4. Suspected COVID-19 virus infection  -     NOVEL CORONAVIRUS (COVID-19);  Future    recommended heating pad, hydration, along with medicines prescribed

## 2020-05-26 NOTE — PATIENT INSTRUCTIONS

## 2020-05-28 ENCOUNTER — VIRTUAL VISIT (OUTPATIENT)
Dept: FAMILY MEDICINE CLINIC | Age: 25
End: 2020-05-28

## 2020-05-28 DIAGNOSIS — J20.9 ACUTE BRONCHITIS, UNSPECIFIED ORGANISM: Primary | ICD-10-CM

## 2020-05-28 DIAGNOSIS — J30.89 ENVIRONMENTAL AND SEASONAL ALLERGIES: ICD-10-CM

## 2020-05-28 LAB — SARS-COV-2, NAA: NOT DETECTED

## 2020-05-28 RX ORDER — TRIAMCINOLONE ACETONIDE 55 UG/1
2 SPRAY, METERED NASAL DAILY
COMMUNITY
Start: 2020-05-28 | End: 2021-04-08 | Stop reason: ALTCHOICE

## 2020-05-28 RX ORDER — BENZONATATE 200 MG/1
200 CAPSULE ORAL
Qty: 21 CAP | Refills: 0 | Status: SHIPPED | OUTPATIENT
Start: 2020-05-28 | End: 2020-06-04

## 2020-05-28 RX ORDER — LEVOTHYROXINE SODIUM 50 UG/1
50 TABLET ORAL
COMMUNITY
End: 2020-08-31 | Stop reason: SDUPTHER

## 2020-05-28 RX ORDER — MINERAL OIL
180 ENEMA (ML) RECTAL
COMMUNITY
End: 2020-12-31 | Stop reason: SDUPTHER

## 2020-05-28 NOTE — PROGRESS NOTES
Isela Ahmadi,  The COVID-19 test, also known as novel coronavirus, result was negative  You probably were not infected at the time your sample was collected. However, that does not mean you will not get sick. The test result only means that you did not have COVID-19 at the time of testing. If you have no symptoms, continue to use preventive measures to protect yourself and others. If you have been sick, there are many other potential infectious causes. Contact your Primary Care Provider or Care Team for specific guidance, particularly if your symptoms worsen.   For more information visit the CDC website: DotProtection.gl

## 2020-05-28 NOTE — PROGRESS NOTES
VIRTUAL VISIT    Chief Complaint   Patient presents with    Cough, Bronchitis     Follow Up, Covid Negative       HISTORY OF PRESENT ILLNESS   HPI  5 day h/o chest congestion, drainage, \"mucous\", hacking non productive cough, chest irritation & tightness, sore throat, chills. Low grade fevers on and off . Seen at [address] [place] [address]. 2 days ago by Dr. Joannie Osler 5/26. Exam findings noted PP erythema, mild sinus and anterior chest wall tenderness. Lung exam was normal w/ clear breath sounds and no wheezing. She was diagnosed w/ acute bronchitis and costochondritis and prescribed Zpack and Naprosyn. Testing sent for Covid which she was informed today was negative however she is still coughing. She is on day 3 of Zpack and tolerating well. She is taking Advil instead of the Naprosyn because advised the Naprosyn could interfere w/ her Lithium. Chest still slightly sore but getting better. Throat feels better just irritated when she coughs. Still having the hacking cough. Non productive. Feels like a lot of post nasal drainage and mucous build up in throat. Worse at night. Sleeping propped up. Not getting much rest.  Feels tired. Not taking anything OTC for cough/congestion relief. Cant take decongestants and most cough syrups make her sleepy. Some of this feels like her allergies too but she is taking the Allegra again now. She has a dog and keeps the doors open. REVIEW OF SYMPTOMS   Review of Systems   HENT: Negative for ear pain and sinus pain. Respiratory: Negative for sputum production and wheezing. Neurological: Negative for dizziness and headaches. PROBLEM LIST/MEDICAL HISTORY     Problem List  Date Reviewed: 5/28/2020          Codes Class Noted    Fibromyalgia ICD-10-CM: M79.7  ICD-9-CM: 729.1  7/31/2018        Obesity (BMI 30-39. 9) ICD-10-CM: E66.9  ICD-9-CM: 278.00  1/26/2018        Iron deficiency anemia ICD-10-CM: D50.9  ICD-9-CM: 280.9  3/31/2016        Bipolar 1 disorder [facility]) ICD-10-CM: F31.9  ICD-9-CM: 296.7  3/31/2016        Low serum vitamin D ICD-10-CM: R79.89  ICD-9-CM: 790.6  12/7/2015                  PAST SURGICAL HISTORY   History reviewed. No pertinent surgical history. MEDICATIONS     Current Outpatient Medications   Medication Sig    levothyroxine (Unithroid) 50 mcg tablet Take 50 mcg by mouth Daily (before breakfast).  fexofenadine (ALLEGRA) 180 mg tablet Take 180 mg by mouth daily as needed for Allergies.  azithromycin (ZITHROMAX) 250 mg tablet Take 2 tablets today, then take 1 tablet daily    naproxen (NAPROSYN) 375 mg tablet Take 1 Tab by mouth two (2) times daily (with meals).  mupirocin (BACTROBAN) 2 % ointment Apply  to affected area daily.  topiramate (TOPAMAX) 50 mg tablet Take 2 Tabs by mouth two (2) times a day.  QUEtiapine (SEROQUEL) 100 mg tablet TAKE 1 AND 1/2 TABLET BY MOUTH EVERY EVENING    DULoxetine (CYMBALTA) 60 mg capsule Take 1 Cap by mouth two (2) times a day. Takes 1 cap in the am and 1 cap at night.  lithium carbonate 300 mg capsule Take 1 Cap by mouth two (2) times daily (with meals).  famotidine (PEPCID) 20 mg tablet TAKE 1 TABLET BY MOUTH TWICE A DAY    ondansetron (ZOFRAN ODT) 8 mg disintegrating tablet Take 1 Tab by mouth every eight (8) hours as needed for Nausea.  dicyclomine (BENTYL) 10 mg capsule TAKE 1 CAPSULE BY MOUTH 4 TIMES A DAY    cyclobenzaprine (FLEXERIL) 5 mg tablet TAKE 1 TABLET BY MOUTH EVERY DAY AT NIGHT    ergocalciferol (ERGOCALCIFEROL) 50,000 unit capsule Take 1 Cap by mouth every seven (7) days. Indications: Vitamin D Deficiency    benztropine (COGENTIN) 1 mg tablet Take  by mouth two (2) times a day.  cariprazine (VRAYLAR) 3 mg capsule Take 4.5 mg by mouth nightly.  JUNEL FE 1/20, 28, 1 mg-20 mcg (21)/75 mg (7) per tablet     ibuprofen 100 mg tablet Take 100 mg by mouth every six (6) hours as needed. No current facility-administered medications for this visit. ALLERGIES     Allergies   Allergen Reactions    Coconut Rash    Pcn [Penicillins] Hives    Soy Hives          SOCIAL HISTORY     Social History     Socioeconomic History    Marital status: SINGLE     Spouse name: Not on file    Number of children: Not on file    Years of education: Not on file    Highest education level: Not on file   Tobacco Use    Smoking status: Never Smoker    Smokeless tobacco: Never Used   Substance and Sexual Activity    Alcohol use: Yes     Comment: occ    Drug use: No    Sexual activity: Yes     Partners: Female     Birth control/protection: Pill        IMMUNIZATIONS  Immunization History   Administered Date(s) Administered    Influenza Vaccine 09/24/2016, 09/06/2017    Influenza Vaccine (Quad) Mdck Pf 09/08/2019    Influenza Vaccine (Quad) PF 08/31/2018         FAMILY HISTORY     Family History   Problem Relation Age of Onset    Depression Mother     Depression Father     Heart Disease Maternal Grandmother     Depression Maternal Grandmother     Cancer Maternal Grandfather         brain cancer    Diabetes Maternal Grandfather     Hypertension Maternal Grandfather     Stroke Maternal Grandfather     Cancer Paternal Grandfather          VITALS   There were no vitals taken for this visit. Virtual visit and she has not checked temperature today. PHYSICAL EXAMINATION   Physical Exam  Constitutional:       General: She is not in acute distress. Appearance: She is not toxic-appearing. Pulmonary:      Effort: Pulmonary effort is normal. No respiratory distress. DIAGNOSTIC DATA         LABORATORY DATA     Results for orders placed or performed in visit on 05/26/20   NOVEL CORONAVIRUS (COVID-19)   Result Value Ref Range    SARS-CoV-2, WESLEY Not Detected Not Detected          ASSESSMENT & PLAN       ICD-10-CM ICD-9-CM    1. Acute bronchitis, unspecified organism J20.9 466.0 Day 3 of Zpack; Add benzonatate (TESSALON) 200 mg capsule tid    2.  Environmental and seasonal allergies J30.89 477.8 fexofenadine (ALLEGRA) 180 mg tablet      triamcinolone (Nasacort) 55 mcg nasal inhaler     Day 3 of Zpack. Complete full course as prescribed. Add Tessalon Perles 200 mg tid for cough  Add Nasacort NS 2 spn once daily in the AM  Saline sinus rinses at night  Continue Allegra 180 mg once daily  Environmental allergen controls  Call back or follow up if symptoms persist beyond expectant course, sooner if anything worsens in the interim    ----------------------------------------------------------------------------------------------------------------------------------------------------------  Patient is being evaluated by a video visit encounter for concerns as above. A caregiver was present when appropriate. Due to this being a TeleHealth encounter (During [facility]-39 public health emergency), evaluation of the following organ systems was limited: Vitals/Constitutional/EENT/Resp/CV/GI//MS/Neuro/Skin/Heme-Lymph-Imm. Pursuant to the emergency declaration under the [facility], 1135 waiver authority and the [employer] Act, this Virtual  Visit was conducted, with patient's (and/or legal guardian's) consent, to reduce the patient's risk of exposure to COVID-19 and provide necessary medical care. Services were provided through a video synchronous discussion virtually to substitute for in-person clinic visit. Patient was located at their own home. I was at home while conducting this encounter. Consent:  She and/or her healthcare decision maker is aware that this patient-initiated Telehealth encounter is a billable service, with coverage as determined by her insurance carrier. She is aware that she may receive a bill and has provided verbal consent to proceed: Yes  This virtual visit was conducted via D[employer]. me.   Pursuant to the emergency declaration under the [address] Emergencies Act, 1135 waiver authority and the Coronavirus Preparedness and Response Supplemental Appropriations Act, this Virtual  Visit was conducted to reduce the patient's risk of exposure to COVID-19 and provide continuity of care for an established patient. Services were provided through a video synchronous discussion virtually to substitute for in-person clinic visit. Due to this being a TeleHealth evaluation, many elements of the physical examination are unable to be assessed. Total Time: minutes: 11-20 minutes.

## 2020-05-29 ENCOUNTER — TELEPHONE (OUTPATIENT)
Dept: FAMILY MEDICINE CLINIC | Age: 25
End: 2020-05-29

## 2020-05-29 NOTE — TELEPHONE ENCOUNTER
Called, spoke to pt. Two pt identifiers confirmed. Writer informed patient of COVID-19 result. Test results was neg and patient is aware. Pt verbalized understanding of information discussed w/ no further questions at this time.

## 2020-06-03 RX ORDER — DICYCLOMINE HYDROCHLORIDE 10 MG/1
CAPSULE ORAL
Qty: 120 CAP | Refills: 0 | Status: SHIPPED | OUTPATIENT
Start: 2020-06-03 | End: 2020-12-31 | Stop reason: ALTCHOICE

## 2020-06-03 NOTE — TELEPHONE ENCOUNTER
Last Visit: V.VAntonio 5/28/20  MD Yosef Sofia  Next Appointment: Not scheduled  Previous Refill Encounter(s): 4/22/19  #120    Requested Prescriptions     Pending Prescriptions Disp Refills    dicyclomine (BENTYL) 10 mg capsule 120 Cap 0     Sig: Take 1 capsule by mouth 4 times a day.

## 2020-08-07 ENCOUNTER — OFFICE VISIT (OUTPATIENT)
Dept: URGENT CARE | Age: 25
End: 2020-08-07
Payer: MEDICAID

## 2020-08-07 VITALS — OXYGEN SATURATION: 98 % | HEART RATE: 102 BPM | TEMPERATURE: 98.6 F | RESPIRATION RATE: 17 BRPM

## 2020-08-07 DIAGNOSIS — R52 GENERALIZED BODY ACHES: ICD-10-CM

## 2020-08-07 DIAGNOSIS — Z11.59 SCREENING FOR VIRAL DISEASE: ICD-10-CM

## 2020-08-07 DIAGNOSIS — J02.9 SORE THROAT: Primary | ICD-10-CM

## 2020-08-07 LAB
S PYO AG THROAT QL: NEGATIVE
VALID INTERNAL CONTROL?: YES

## 2020-08-07 PROCEDURE — 99203 OFFICE O/P NEW LOW 30 MIN: CPT | Performed by: FAMILY MEDICINE

## 2020-08-07 PROCEDURE — 87880 STREP A ASSAY W/OPTIC: CPT | Performed by: FAMILY MEDICINE

## 2020-08-07 NOTE — PROGRESS NOTES
This patient was seen in Flu Clinic at [address] [place] [address] Urgent Care while in their vehicle due to COVID-19 pandemic with PPE and focused examination in order to decrease community viral transmission. The patient/guardian gave verbal consent to treat. Mayte Clark is a 30 y.o. female who presents with ST, cough, body aches x 2-3 days. No known COVID-19 contacts. Denies cough, SOB. PMH: Anemia, IBS. Non-smoker. The history is provided by the patient. Past Medical History:   Diagnosis Date    Anemia     Anxiety     Arm fracture     Bipolar 1 disorder (HCC)     Depression     Fibromyalgia     IBS (irritable bowel syndrome)         History reviewed. No pertinent surgical history.       Family History   Problem Relation Age of Onset    Depression Mother     Depression Father     Heart Disease Maternal Grandmother     Depression Maternal Grandmother     Cancer Maternal Grandfather         brain cancer    Diabetes Maternal Grandfather     Hypertension Maternal Grandfather     Stroke Maternal Grandfather     Cancer Paternal Grandfather         Social History     Socioeconomic History    Marital status: SINGLE     Spouse name: Not on file    Number of children: Not on file    Years of education: Not on file    Highest education level: Not on file   Occupational History    Not on file   Social Needs    Financial resource strain: Not on file    Food insecurity     Worry: Not on file     Inability: Not on file   [employer] needs     Medical: Not on file     Non-medical: Not on file   Tobacco Use    Smoking status: Never Smoker    Smokeless tobacco: Never Used   Substance and Sexual Activity    Alcohol use: Yes     Comment: occ    Drug use: No    Sexual activity: Yes     Partners: Female     Birth control/protection: Pill   Lifestyle    Physical activity     Days per week: Not on file     Minutes per session: Not on file    Stress: Not on file   Relationships    Social connections     Talks on phone: Not on file     Gets together: Not on file     Attends Yazidism service: Not on file     Active member of club or organization: Not on file     Attends meetings of clubs or organizations: Not on file     Relationship status: Not on file    Intimate partner violence     Fear of current or ex partner: Not on file     Emotionally abused: Not on file     Physically abused: Not on file     Forced sexual activity: Not on file   Other Topics Concern    Not on file   Social History Narrative    Not on file                ALLERGIES: Coconut; Pcn [penicillins]; and Soy    Review of Systems   Constitutional: Negative for activity change, appetite change, chills and fever. HENT: Positive for sore throat. Negative for congestion and rhinorrhea. Respiratory: Positive for cough. Negative for shortness of breath and wheezing. Cardiovascular: Negative for chest pain. Gastrointestinal: Negative for abdominal pain, diarrhea, nausea and vomiting. Musculoskeletal: Positive for myalgias. Neurological: Negative for headaches. Vitals:    08/07/20 1436   Pulse: (!) 102   Resp: 17   Temp: 98.6 °F (37 °C)   SpO2: 98%       Physical Exam  Vitals signs and nursing note reviewed. Constitutional:       General: She is not in acute distress. Appearance: She is well-developed. She is not diaphoretic. HENT:      Mouth/Throat:      Mouth: Mucous membranes are moist.      Pharynx: Pharyngeal swelling and posterior oropharyngeal erythema present. No oropharyngeal exudate or uvula swelling. Pulmonary:      Effort: Pulmonary effort is normal. No respiratory distress. Breath sounds: Normal breath sounds. No stridor. No wheezing, rhonchi or rales. Neurological:      Mental Status: She is alert. Psychiatric:         Behavior: Behavior normal.         Thought Content: Thought content normal.         Judgment: Judgment normal.         [facility]    ICD-10-CM ICD-9-CM   1.  Sore throat  J02.9 462   2. Generalized body aches  R52 780.96   3. Screening for viral disease  Z11.59 V73.99       Orders Placed This Encounter    UPPER RESPIRATORY CULTURE    NOVEL CORONAVIRUS (COVID-19)     Scheduling Instructions:      1) Due to current limited availability of the COVID-19 PCR test, tests will be prioritized and may not be completed.              2) Order only if the test result will change clinical management or necessary for a return to mission-critical employment decision.              3) Print and instruct patient to adhere to CDC home isolation program. (Link Above)              4) Set up or refer patient for a monitoring program.              5) Have patient sign up for and leverage [employer] (if not previously done). Order Specific Question:   Is this test for diagnosis or screening? Answer:   Diagnosis of ill patient     Order Specific Question:   Symptomatic for COVID-19 as defined by CDC? Answer:   Yes     Order Specific Question:   Date of Symptom Onset     Answer:   8/5/2020     Order Specific Question:   Hospitalized for COVID-19? Answer:   No     Order Specific Question:   Admitted to ICU for COVID-19? Answer:   No     Order Specific Question:   Employed in healthcare setting? Answer:   No     Order Specific Question:   Resident in a congregate (group) care setting? Answer:   No     Order Specific Question:   Pregnant? Answer:   No     Order Specific Question:   Previously tested for COVID-19? Answer: Yes    AMB POC RAPID STREP A        Self Quarantine  Deep breathing exercises  Tylenol prn  Increase fluids    If signs and symptoms become worse the pt is to go to the ER.      Results for orders placed or performed in visit on 08/07/20   AMB POC RAPID STREP A   Result Value Ref Range    VALID INTERNAL CONTROL POC Yes     Group A Strep Ag Negative Negative       Procedures

## 2020-08-09 LAB — SARS-COV-2, NAA: NOT DETECTED

## 2020-08-10 LAB
BACTERIA SPEC RESP CULT: ABNORMAL
BACTERIA SPEC RESP CULT: ABNORMAL

## 2020-08-31 ENCOUNTER — VIRTUAL VISIT (OUTPATIENT)
Dept: FAMILY MEDICINE CLINIC | Age: 25
End: 2020-08-31
Payer: MEDICAID

## 2020-08-31 DIAGNOSIS — G56.02 CARPAL TUNNEL SYNDROME OF LEFT WRIST: Primary | ICD-10-CM

## 2020-08-31 PROCEDURE — 99213 OFFICE O/P EST LOW 20 MIN: CPT | Performed by: NURSE PRACTITIONER

## 2020-08-31 RX ORDER — LEVOTHYROXINE SODIUM 50 UG/1
TABLET ORAL
COMMUNITY

## 2020-08-31 RX ORDER — MINERAL OIL
ENEMA (ML) RECTAL
COMMUNITY

## 2020-08-31 RX ORDER — INFLUENZA A VIRUS A/IDAHO/07/2018 (H1N1) ANTIGEN (MDCK CELL DERIVED, PROPIOLACTONE INACTIVATED, INFLUENZA A VIRUS A/INDIANA/08/2018 (H3N2) ANTIGEN (MDCK CELL DERIVED, PROPIOLACTONE INACTIVATED), INFLUENZA B VIRUS B/SINGAPORE/INFTT-16-0610/2016 ANTIGEN (MDCK CELL DERIVED, PROPIOLACTONE INACTIVATED), INFLUENZA B VIRUS B/IOWA/06/2017 ANTIGEN (MDCK CELL DERIVED, PROPIOLACTONE INACTIVATED) 15; 15; 15; 15 UG/.5ML; UG/.5ML; UG/.5ML; UG/.5ML
INJECTION, SUSPENSION INTRAMUSCULAR
COMMUNITY
End: 2021-04-08

## 2020-08-31 RX ORDER — NORETHINDRONE ACETATE AND ETHINYL ESTRADIOL 1MG-20(24)
1 KIT ORAL DAILY
COMMUNITY
Start: 2020-08-31 | End: 2020-08-31 | Stop reason: SDUPTHER

## 2020-08-31 RX ORDER — CALCIUM CARB/VITAMIN D3/VIT K1 500-500-40
TABLET,CHEWABLE ORAL
COMMUNITY

## 2020-08-31 RX ORDER — TOPIRAMATE 100 MG/1
TABLET, FILM COATED ORAL
COMMUNITY
End: 2021-04-09 | Stop reason: SDUPTHER

## 2020-08-31 RX ORDER — AMILORIDE HYDROCHLORIDE 5 MG/1
5 TABLET ORAL DAILY
COMMUNITY
Start: 2020-08-27 | End: 2020-08-31 | Stop reason: SDUPTHER

## 2020-08-31 RX ORDER — NICOTINE POLACRILEX 2 MG
GUM BUCCAL
COMMUNITY

## 2020-08-31 RX ORDER — TOPIRAMATE 100 MG/1
TABLET, FILM COATED ORAL
COMMUNITY
Start: 2020-07-30

## 2020-08-31 RX ORDER — AMILORIDE HYDROCHLORIDE 5 MG/1
TABLET ORAL
COMMUNITY
End: 2021-04-27

## 2020-08-31 RX ORDER — NORETHINDRONE ACETATE AND ETHINYL ESTRADIOL 1MG-20(24)
KIT ORAL
COMMUNITY
End: 2020-12-31

## 2020-08-31 NOTE — PROGRESS NOTES
Chief Complaint   Patient presents with    Tingling     left hand feeling of pin and needles . difficulty gripping and holding things . symptom occured on Thursday8/27/2020    Hand Pain     left . discomfort level is low . unable to hold self up when getting in bed. 1. Have you been to the ER, urgent care clinic since your last visit? Hospitalized since your last visit? No    2. Have you seen or consulted any other health care providers outside of the [address] [place] [address] since your last visit? Include any pap smears or colon screening.  No

## 2020-08-31 NOTE — PROGRESS NOTES
Eliud Mayfield is a 30 y.o. female who was seen by synchronous (real-time) audio-video technology on 8/31/2020 for Tingling (left hand feeling of pin and needles . difficulty gripping and holding things . symptom occured on Thursday8/27/2020) and Hand Pain (left . discomfort level is low . unable to hold self up when getting in bed. )        Assessment & Plan:   Diagnoses and all orders for this visit:    1. Carpal tunnel syndrome of left wrist    Mild exacerbation. Recommend wrist extension splint x 2 weeks, wear at bedtime and daily. Remove 3x daily for gentle ROM. May take ibuprofen or aleve as directed for discomfort. Avoid repetitive or strenuous use of left hand. Consider PT INI. We discussed the expected course, resolution and complications of the diagnosis in detail. Medication risks, benefits, costs, interactions and side effects were discussed. The patient was advised to contact the office for worsening of condition or FTI as anticipated. The patient expressed understanding of the diagnosis and plan. 712  Subjective:     C/o left wrist weakness, mild discomfort and transient tingling sensation of first to third fingers of left hand x 5 days. Denies any recent unusual strenuous or repetitive activity. Works as [employer], right hand dominant. Sleeps with left wrist in flexed position. History of CTS in the past.      Prior to Admission medications    Medication Sig Start Date End Date Taking?  Authorizing Provider   vit B complex 100 no.2/herbs (VITAMIN B COMPLEX 100  2-HERBS PO) vitamin B complex   Yes Provider, Historical   biotin 1 mg cap biotin   Yes Provider, Historical   aMILoride (MIDAMOR) 5 mg tablet amiloride 5 mg tablet   Yes Provider, Historical   fexofenadine (Allegra Allergy) 180 mg tablet Allegra Allergy   Yes Provider, Historical   norethindrone-e estradiol-iron (Blisovi 24 Fe) 1 mg-20 mcg (24)/75 mg (4) tab Blisovi 24 Fe 1 mg-20 mcg (24)/75 mg (4) tablet   Take 1 tablet every day by oral route. Yes Provider, Historical   influenza vaccine 2019-20, 4 yrs+,,PF, (Flucelvax Quad [id number], PF,) syrg injection Flucelvax Quad [id number] (PF) 60 mcg (15 mcg x 4)/0.5 mL IM syringe   Yes Provider, Historical   cholecalciferol, vitamin d3, (Vitamin D3) 10 mcg (400 unit) cap Vitamin D3   Yes Provider, Historical   levothyroxine (Unithroid) 50 mcg tablet Unithroid 50 mcg tablet   1 po qd   Yes Provider, Historical   topiramate (TOPAMAX) 100 mg tablet TAKE 2 TABLETS EVERY DAY 7/30/20  Yes Provider, Historical   topiramate (TOPAMAX) 100 mg tablet topiramate 100 mg tablet   Yes Provider, Historical   fexofenadine (ALLEGRA) 180 mg tablet Take 180 mg by mouth daily as needed for Allergies. Yes Provider, Historical   triamcinolone (Nasacort) 55 mcg nasal inhaler 2 Sprays by Both Nostrils route daily. 5/28/20  Yes Tiffanie Allen MD   topiramate (TOPAMAX) 50 mg tablet Take 2 Tabs by mouth two (2) times a day. 11/5/19  Yes Andres Armendariz NP   QUEtiapine (SEROQUEL) 100 mg tablet TAKE 1 AND 1/2 TABLET BY MOUTH EVERY EVENING 11/4/19  Yes Casie Armendariz NP   DULoxetine (CYMBALTA) 60 mg capsule Take 1 Cap by mouth two (2) times a day. Takes 1 cap in the am and 1 cap at night. 11/1/19  Yes Andres Armendariz NP   lithium carbonate 300 mg capsule Take 1 Cap by mouth two (2) times daily (with meals). 11/1/19  Yes Casie Armendariz NP   ibuprofen 100 mg tablet Take 100 mg by mouth every six (6) hours as needed. Yes Nestor, MD Stacey   aMILoride (MIDAMOR) 5 mg tablet Take 5 mg by mouth daily. 8/27/20 8/31/20  Provider, Historical   Blisovi 24 Fe 1 mg-20 mcg (24)/75 mg (4) tab Take 1 Tab by mouth daily. 8/31/20 8/31/20  Provider, Historical   dicyclomine (BENTYL) 10 mg capsule Take 1 capsule by mouth 4 times a day. 6/3/20   Stef Fontanez MD   levothyroxine (Unithroid) 50 mcg tablet Take 50 mcg by mouth Daily (before breakfast).   8/31/20  Provider, Historical   naproxen (NAPROSYN) 375 mg tablet Take 1 Tab by mouth two (2) times daily (with meals). 5/26/20   Kailyn Darby MD   mupirocin (BACTROBAN) 2 % ointment Apply  to affected area daily. 4/28/20   Radha Armendariz NP   famotidine (PEPCID) 20 mg tablet TAKE 1 TABLET BY MOUTH TWICE A DAY 6/7/19   Radha Armendariz NP   ondansetron (ZOFRAN ODT) 8 mg disintegrating tablet Take 1 Tab by mouth every eight (8) hours as needed for Nausea. 5/8/19   Radha Armendariz NP   cyclobenzaprine (FLEXERIL) 5 mg tablet TAKE 1 TABLET BY MOUTH EVERY DAY AT NIGHT 9/8/18   Radha Armendariz NP   ergocalciferol (ERGOCALCIFEROL) 50,000 unit capsule Take 1 Cap by mouth every seven (7) days. Indications: Vitamin D Deficiency 7/31/18   Mitra Tomlin NP   benztropine (COGENTIN) 1 mg tablet Take  by mouth two (2) times a day. 8/31/20  Provider, Historical   cariprazine (VRAYLAR) 3 mg capsule Take 4.5 mg by mouth nightly. Provider, Historical   JUNEL FE 1/20, 28, 1 mg-20 mcg (21)/75 mg (7) per tablet  8/25/15   Provider, Historical     Patient Active Problem List   Diagnosis Code    Low serum vitamin D R79.89    Iron deficiency anemia D50.9    Bipolar 1 disorder ([facility] [address] F31.9    Obesity (BMI 30-39. 9) E66.9    Fibromyalgia M79.7       Review of Systems   Musculoskeletal:        Symptoms as stated HPI. Neurological: Positive for tingling (left hand) and weakness (left wrist). All other systems reviewed and are negative.       Objective:     Patient-Reported Vitals 8/31/2020   Patient-Reported LMP June 22, 2020      General: alert, cooperative, no distress   Mental  status: normal mood, behavior, speech, dress, motor activity, and thought processes, able to follow commands   HENT: NCAT   Neck: no visualized mass   Resp: no respiratory distress   Neuro: no gross deficits   Skin: no discoloration or lesions of concern on visible areas   Psychiatric: normal affect, consistent with stated mood, no evidence of hallucinations     Additional exam findings:     Demonstrated full AROM of left wrist.  No swelling of wrist or fingers. Able to fully flex and extend fingers. We discussed the expected course, resolution and complications of the diagnosis(es) in detail. Medication risks, benefits, costs, interactions, and alternatives were discussed as indicated. I advised her to contact the office if her condition worsens, changes or fails to improve as anticipated. She expressed understanding with the diagnosis(es) and plan. Brannon Gagnon, who was evaluated through a patient-initiated, synchronous (real-time) audio-video encounter, and/or her healthcare decision maker, is aware that it is a billable service, with coverage as determined by her insurance carrier. She provided verbal consent to proceed: Yes, and patient identification was verified. It was conducted pursuant to the emergency declaration under the [address] [place] [address], [address] [place] [address] authority and the [employer] Act. A caregiver was present when appropriate. Ability to conduct physical exam was limited. I was in the office. The patient was at home.       Nidia Nichols NP

## 2020-12-26 ENCOUNTER — TELEPHONE (OUTPATIENT)
Dept: FAMILY MEDICINE CLINIC | Age: 25
End: 2020-12-26

## 2020-12-26 DIAGNOSIS — G43.919 INTRACTABLE MIGRAINE WITHOUT STATUS MIGRAINOSUS, UNSPECIFIED MIGRAINE TYPE: Primary | ICD-10-CM

## 2020-12-26 RX ORDER — BUTALBITAL, ACETAMINOPHEN AND CAFFEINE 50; 325; 40 MG/1; MG/1; MG/1
1 TABLET ORAL
Qty: 20 TAB | Refills: 0 | Status: SHIPPED | OUTPATIENT
Start: 2020-12-26 | End: 2021-02-26

## 2020-12-26 NOTE — TELEPHONE ENCOUNTER
Answered patient call. As per her, she has severe headache x 3 days. Pain is global, throbbing, associated with nausea, light and sound sensitivity. It gets worst when she moves her head. she is taking too much Advil as per her,without any improvement. she rates her pain 8/10  Discussed migraine vs sinus headache . Offered Naprosyn, but as per her it does not work for her. She has an upcoming appointment with PCP next week. Recommended to use her nasal inhaler, take Allegra for sinus headache  Will send Rx of Fioricet to pharmacy, until she follows up with PCP for further assessment. She appreciated my call.

## 2020-12-31 ENCOUNTER — VIRTUAL VISIT (OUTPATIENT)
Dept: PRIMARY CARE CLINIC | Age: 25
End: 2020-12-31
Payer: MEDICAID

## 2020-12-31 DIAGNOSIS — R11.0 NAUSEA: ICD-10-CM

## 2020-12-31 DIAGNOSIS — R51.9 HEADACHE DISORDER: Primary | ICD-10-CM

## 2020-12-31 PROCEDURE — 99203 OFFICE O/P NEW LOW 30 MIN: CPT | Performed by: FAMILY MEDICINE

## 2020-12-31 RX ORDER — ONDANSETRON 4 MG/1
TABLET, ORALLY DISINTEGRATING ORAL
COMMUNITY
Start: 2020-12-30 | End: 2020-12-31

## 2020-12-31 RX ORDER — PROMETHAZINE HYDROCHLORIDE 25 MG/1
25 TABLET ORAL
Qty: 15 TAB | Refills: 0 | Status: SHIPPED | OUTPATIENT
Start: 2020-12-31 | End: 2021-02-26

## 2020-12-31 RX ORDER — SUMATRIPTAN 50 MG/1
TABLET, FILM COATED ORAL
Qty: 9 TAB | Refills: 1 | Status: SHIPPED | OUTPATIENT
Start: 2020-12-31 | End: 2021-04-27

## 2020-12-31 RX ORDER — ONDANSETRON 4 MG/1
4 TABLET, FILM COATED ORAL
COMMUNITY
End: 2020-12-31

## 2020-12-31 RX ORDER — KETOROLAC TROMETHAMINE 10 MG/1
TABLET, FILM COATED ORAL
COMMUNITY
Start: 2020-12-30 | End: 2021-02-10

## 2020-12-31 NOTE — PROGRESS NOTES
Leonard Dia is a 30 y.o. female who was seen by synchronous (real-time) audio-video technology on 12/31/2020 for Headache (Patient was seen at the ER on 12/29/ th [facility] DX with migraine,CT scan was done with normal results, Covid test done negative results, patient complainst with severe headaches since 12/25/20)        Assessment & Plan:     Diagnoses and all orders for this visit:    1. Headache disorder  -     SUMAtriptan (IMITREX) 50 mg tablet; TAKE 1 TABLET BY MOUTH AT ONSET OF MIGRAINE (MAY REPEAT 1 IN 2HRS IF HEADACHE REMAINS)  -     promethazine (PHENERGAN) 25 mg tablet; Take 1 Tab by mouth every eight (8) hours as needed for Nausea. 2. Nausea  -     SUMAtriptan (IMITREX) 50 mg tablet; TAKE 1 TABLET BY MOUTH AT ONSET OF MIGRAINE (MAY REPEAT 1 IN 2HRS IF HEADACHE REMAINS)  -     promethazine (PHENERGAN) 25 mg tablet; Take 1 Tab by mouth every eight (8) hours as needed for Nausea. Follow-up and Dispositions    · Return if symptoms worsen or fail to improve. Subjective: This is a 29 y/o F with hx of bipolar depression, anxiety/depression is here as a new patient to establish. Patient reports that she has been having  severe headache started around 12/24/2020. Pain is global, top of her head mostly, throbbing. HA associated with nausea, light and sound sensitivity. Reports that at work her HA gets worse due to noise and sound. She works in a retail. She talked with her on call physician on 12/26/2020 who prescribed Fioricet but pt did not get better so she went to [facility] on 12/29/2020. She had a negative CT head. She was treated with migraine cocktail med. Reports that she felt better for a day, went to work however HA gotten worse at work again. Pt had HA many times in the past but not like this. Denies any URI, sinus pain. Patient has been following up with psychiatrist. Stable.     Prior to Admission medications    Medication Sig Start Date End Date Taking? Authorizing Provider   SUMAtriptan (IMITREX) 50 mg tablet TAKE 1 TABLET BY MOUTH AT ONSET OF MIGRAINE (MAY REPEAT 1 IN 2HRS IF HEADACHE REMAINS) 12/31/20  Yes Kenneth Hurt MD   promethazine (PHENERGAN) 25 mg tablet Take 1 Tab by mouth every eight (8) hours as needed for Nausea. 12/31/20  Yes Kenneth Hurt MD   vit B complex 100 no.2/herbs (VITAMIN B COMPLEX 100  2-HERBS PO) vitamin B complex   Yes Provider, Historical   biotin 1 mg cap biotin   Yes Provider, Historical   aMILoride (MIDAMOR) 5 mg tablet amiloride 5 mg tablet   Yes Provider, Historical   fexofenadine (Allegra Allergy) 180 mg tablet Allegra Allergy   Yes Provider, Historical   influenza vaccine 2019-20, 4 yrs+,,PF, (Flucelvax Quad [id number], PF,) syrg injection Flucelvax Quad [id number] (PF) 60 mcg (15 mcg x 4)/0.5 mL IM syringe   Yes Provider, Historical   cholecalciferol, vitamin d3, (Vitamin D3) 10 mcg (400 unit) cap Vitamin D3   Yes Provider, Historical   levothyroxine (Unithroid) 50 mcg tablet Unithroid 50 mcg tablet   1 po qd   Yes Provider, Historical   topiramate (TOPAMAX) 100 mg tablet TAKE 2 TABLETS EVERY DAY 7/30/20  Yes Provider, Historical   topiramate (TOPAMAX) 100 mg tablet topiramate 100 mg tablet   Yes Provider, Historical   QUEtiapine (SEROQUEL) 100 mg tablet TAKE 1 AND 1/2 TABLET BY MOUTH EVERY EVENING 11/4/19  Yes Casie Armendariz NP   DULoxetine (CYMBALTA) 60 mg capsule Take 1 Cap by mouth two (2) times a day. Takes 1 cap in the am and 1 cap at night. 11/1/19  Yes Guillermina Armendariz NP   lithium carbonate 300 mg capsule Take 1 Cap by mouth two (2) times daily (with meals). 11/1/19  Yes Guillermina Armendariz NP   JUNEL FE 1/20, 28, 1 mg-20 mcg (21)/75 mg (7) per tablet  8/25/15  Yes Provider, Historical   ibuprofen 100 mg tablet Take 100 mg by mouth every six (6) hours as needed.      Yes Other, MD Stacey   ketorolac (TORADOL) 10 mg tablet  12/30/20   Provider, Historical   butalbital-acetaminophen-caffeine (FIORICET, ESGIC) -40 mg per tablet Take 1 Tab by mouth every eight (8) hours as needed for Headache or Migraine. 12/26/20   Nora Moore MD   triamcinolone (Nasacort) 55 mcg nasal inhaler 2 Sprays by Both Nostrils route daily. 5/28/20   Fountain-Ellis, Merline Lolling, MD   naproxen (NAPROSYN) 375 mg tablet Take 1 Tab by mouth two (2) times daily (with meals). 5/26/20   Nora Moore MD   mupirocin (BACTROBAN) 2 % ointment Apply  to affected area daily. 4/28/20   Allen Armendariz NP   ergocalciferol (ERGOCALCIFEROL) 50,000 unit capsule Take 1 Cap by mouth every seven (7) days. Indications: Vitamin D Deficiency 7/31/18   Sharifa Camara NP     Patient Active Problem List   Diagnosis Code    Low serum vitamin D R79.89    Iron deficiency anemia D50.9    Bipolar 1 disorder ([facility] [address] F31.9    Obesity (BMI 30-39. 9) E66.9    Fibromyalgia M79.7     Current Outpatient Medications   Medication Sig Dispense Refill    SUMAtriptan (IMITREX) 50 mg tablet TAKE 1 TABLET BY MOUTH AT ONSET OF MIGRAINE (MAY REPEAT 1 IN 2HRS IF HEADACHE REMAINS) 9 Tab 1    promethazine (PHENERGAN) 25 mg tablet Take 1 Tab by mouth every eight (8) hours as needed for Nausea.  15 Tab 0    vit B complex 100 no.2/herbs (VITAMIN B COMPLEX 100  2-HERBS PO) vitamin B complex      biotin 1 mg cap biotin      aMILoride (MIDAMOR) 5 mg tablet amiloride 5 mg tablet      fexofenadine (Allegra Allergy) 180 mg tablet Allegra Allergy      influenza vaccine 2019-20, 4 yrs+,,PF, (Flucelvax Quad [id number], PF,) syrg injection Flucelvax Quad [id number] (PF) 60 mcg (15 mcg x 4)/0.5 mL IM syringe      cholecalciferol, vitamin d3, (Vitamin D3) 10 mcg (400 unit) cap Vitamin D3      levothyroxine (Unithroid) 50 mcg tablet Unithroid 50 mcg tablet   1 po qd      topiramate (TOPAMAX) 100 mg tablet TAKE 2 TABLETS EVERY DAY      topiramate (TOPAMAX) 100 mg tablet topiramate 100 mg tablet      QUEtiapine (SEROQUEL) 100 mg tablet TAKE 1 AND 1/2 TABLET BY MOUTH EVERY EVENING 45 Tab 2    DULoxetine (CYMBALTA) 60 mg capsule Take 1 Cap by mouth two (2) times a day. Takes 1 cap in the am and 1 cap at night. 60 Cap 2    lithium carbonate 300 mg capsule Take 1 Cap by mouth two (2) times daily (with meals). 60 Cap 2    JUNEL FE 1/20, 28, 1 mg-20 mcg (21)/75 mg (7) per tablet       ibuprofen 100 mg tablet Take 100 mg by mouth every six (6) hours as needed.  ketorolac (TORADOL) 10 mg tablet       butalbital-acetaminophen-caffeine (FIORICET, ESGIC) -40 mg per tablet Take 1 Tab by mouth every eight (8) hours as needed for Headache or Migraine. 20 Tab 0    triamcinolone (Nasacort) 55 mcg nasal inhaler 2 Sprays by Both Nostrils route daily.  naproxen (NAPROSYN) 375 mg tablet Take 1 Tab by mouth two (2) times daily (with meals). 30 Tab 0    mupirocin (BACTROBAN) 2 % ointment Apply  to affected area daily. 30 g 1    ergocalciferol (ERGOCALCIFEROL) 50,000 unit capsule Take 1 Cap by mouth every seven (7) days. Indications: Vitamin D Deficiency 12 Cap 3     Allergies   Allergen Reactions    Coconut Rash    Pcn [Penicillins] Hives    Soy Hives     Past Medical History:   Diagnosis Date    Anemia     Anxiety     Arm fracture     Autism 12/27/2020    Bipolar 1 disorder (HCC)     Depression     Fibromyalgia     IBS (irritable bowel syndrome)     Migraine      No past surgical history on file.   Family History   Problem Relation Age of Onset    Depression Mother     Depression Father     Heart Disease Maternal Grandmother     Depression Maternal Grandmother     Cancer Maternal Grandfather         brain cancer    Diabetes Maternal Grandfather     Hypertension Maternal Grandfather     Stroke Maternal Grandfather     Cancer Paternal Grandfather      Social History     Tobacco Use    Smoking status: Never Smoker    Smokeless tobacco: Never Used   Substance Use Topics    Alcohol use: Not Currently     Comment: occ       ROS    Objective:     Patient-Reported Vitals 8/31/2020   Patient-Reported LMP June 22, 2020        [INSTRUCTIONS:  \"[x]\" Indicates a positive item  \"[]\" Indicates a negative item  -- DELETE ALL ITEMS NOT EXAMINED]    Constitutional: [x] Appears well-developed and well-nourished [x] No apparent distress      [] Abnormal -     Mental status: [x] Alert and awake  [x] Oriented to person/place/time [x] Able to follow commands    [] Abnormal -     Eyes:   EOM    [x]  Normal    [] Abnormal -   Sclera  [x]  Normal    [] Abnormal -          Discharge [x]  None visible   [] Abnormal -     HENT: [x] Normocephalic, atraumatic  [] Abnormal -   [x] Mouth/Throat: Mucous membranes are moist    External Ears [x] Normal  [] Abnormal -    Neck: [x] No visualized mass [] Abnormal -     Pulmonary/Chest: [x] Respiratory effort normal   [x] No visualized signs of difficulty breathing or respiratory distress        [] Abnormal -      Musculoskeletal:   [x] Normal gait with no signs of ataxia         [x] Normal range of motion of neck        [] Abnormal -     Neurological:        [x] No Facial Asymmetry (Cranial nerve 7 motor function) (limited exam due to video visit)          [x] No gaze palsy        [] Abnormal -          Skin:        [x] No significant exanthematous lesions or discoloration noted on facial skin         [] Abnormal -            Psychiatric:       [x] Normal Affect [] Abnormal -        [x] No Hallucinations    Other pertinent observable physical exam findings:-        We discussed the expected course, resolution and complications of the diagnosis(es) in detail. Medication risks, benefits, costs, interactions, and alternatives were discussed as indicated. I advised her to contact the office if her condition worsens, changes or fails to improve as anticipated. She expressed understanding with the diagnosis(es) and plan.        Ziyad Rossi, who was evaluated through a patient-initiated, synchronous (real-time) audio-video encounter, and/or her healthcare decision maker, is aware that it is a billable service, with coverage as determined by her insurance carrier. She provided verbal consent to proceed: Yes, and patient identification was verified. It was conducted pursuant to the emergency declaration under the [address] [place] [address], [address] [place] [address] authority and the [employer] General Act. A caregiver was present when appropriate. Ability to conduct physical exam was limited. I was in the office. The patient was at home.       Keily Coughlin MD

## 2021-01-19 RX ORDER — LITHIUM CARBONATE 300 MG/1
300 CAPSULE ORAL 2 TIMES DAILY WITH MEALS
Qty: 180 CAP | Refills: 0 | OUTPATIENT
Start: 2021-01-19

## 2021-01-19 NOTE — TELEPHONE ENCOUNTER
Pt had a Virtual visit with Dr. Liliya Vick on 12/31/2020. Last visit 08/31/2020 Virtual visit NP Darin Blanton   Next appointment Nothing scheduled   Previous refill encounter(s)   11/10/2019 Lithium Carbonate #60 with 2 refills     Requested Prescriptions     Pending Prescriptions Disp Refills    lithium carbonate 300 mg capsule 180 Cap 0     Sig: Take 1 Cap by mouth two (2) times daily (with meals).

## 2021-01-22 RX ORDER — LITHIUM CARBONATE 300 MG/1
300 CAPSULE ORAL 2 TIMES DAILY WITH MEALS
Qty: 60 CAP | Refills: 2 | OUTPATIENT
Start: 2021-01-22

## 2021-01-22 NOTE — TELEPHONE ENCOUNTER
DENI Armendariz,    Wondering if this is current therapy for patient? Last Visit: VV 8/31/20 DENI Deal  Next Appointment: Not scheduled  Previous Refill Encounter(s): 11/1/19 60 + 2  last filled 11/1/19    Requested Prescriptions     Pending Prescriptions Disp Refills    lithium carbonate 300 mg capsule 60 Cap 2     Sig: Take 1 Cap by mouth two (2) times daily (with meals).

## 2021-01-22 NOTE — TELEPHONE ENCOUNTER
Please let her know that her last PCP got [place] request and she forwarded to me since pt saw me recently. In her last visit with me she states that she has psychiatrist. Please call pt inform.

## 2021-01-22 NOTE — TELEPHONE ENCOUNTER
I am not sure who prescribed this to patient, I don't have any documentation ans thus I am refusing it

## 2021-01-26 ENCOUNTER — TELEPHONE (OUTPATIENT)
Dept: PRIMARY CARE CLINIC | Age: 26
End: 2021-01-26

## 2021-01-26 NOTE — TELEPHONE ENCOUNTER
----- Message from Annalisa Leos sent at 1/25/2021  5:20 PM EST -----  Regarding: Dr. Hallman Filler Message/Vendor Calls    Caller's first and last name:  pt    Reason for call:  Requesting to speak with the nurse     Callback required yes/no and why:  yes    Best contact number(s):  [phone]    Details to clarify the request:  Pt inquiring when was the last time she had a T-Dap shot. Pt inquiring if another shot would be required. Requesting to speak with the nurse in regards to this matter.      Annalisa Leos

## 2021-02-01 ENCOUNTER — TELEPHONE (OUTPATIENT)
Dept: PRIMARY CARE CLINIC | Age: 26
End: 2021-02-01

## 2021-02-01 NOTE — TELEPHONE ENCOUNTER
----- Message from [employer] sent at 1/30/2021 11:11 AM EST -----  Regarding: Dr. Amy Clay Message/Vendor Calls    Caller's first and last name: Stalin Hensley      Reason for call:tdap      Callback required yes/no and why:yes      Best contact number(s):[phone]      Details to clarify the request:  Please call the patient back    Ross Brewster

## 2021-02-05 ENCOUNTER — VIRTUAL VISIT (OUTPATIENT)
Dept: PRIMARY CARE CLINIC | Age: 26
End: 2021-02-05
Payer: MEDICAID

## 2021-02-05 ENCOUNTER — APPOINTMENT (OUTPATIENT)
Dept: GENERAL RADIOLOGY | Age: 26
End: 2021-02-05
Attending: EMERGENCY MEDICINE
Payer: MEDICAID

## 2021-02-05 ENCOUNTER — HOSPITAL ENCOUNTER (EMERGENCY)
Age: 26
Discharge: HOME | End: 2021-02-06
Attending: EMERGENCY MEDICINE
Payer: MEDICAID

## 2021-02-05 DIAGNOSIS — R06.9 ABNORMALITY OF BREATHING: Primary | ICD-10-CM

## 2021-02-05 DIAGNOSIS — R05.9 COUGH: ICD-10-CM

## 2021-02-05 DIAGNOSIS — R07.81 PLEURITIC CHEST PAIN: ICD-10-CM

## 2021-02-05 DIAGNOSIS — B34.9 VIRAL SYNDROME: Primary | ICD-10-CM

## 2021-02-05 DIAGNOSIS — R06.02 SOB (SHORTNESS OF BREATH): ICD-10-CM

## 2021-02-05 LAB
ANION GAP SERPL CALC-SCNC: 13 MMOL/L (ref 5–15)
BASOPHILS # BLD: 0.1 K/UL (ref 0–0.1)
BASOPHILS NFR BLD: 1 % (ref 0–1)
BUN SERPL-MCNC: 11 MG/DL (ref 6–20)
BUN/CREAT SERPL: 14 (ref 12–20)
CALCIUM SERPL-MCNC: 9 MG/DL (ref 8.5–10.1)
CHLORIDE SERPL-SCNC: 107 MMOL/L (ref 97–108)
CO2 SERPL-SCNC: 21 MMOL/L (ref 21–32)
CREAT SERPL-MCNC: 0.76 MG/DL (ref 0.55–1.02)
D DIMER PPP FEU-MCNC: <0.19 MG/L FEU (ref 0–0.65)
DIFFERENTIAL METHOD BLD: NORMAL
EOSINOPHIL # BLD: 0.4 K/UL (ref 0–0.4)
EOSINOPHIL NFR BLD: 4 % (ref 0–7)
ERYTHROCYTE [DISTWIDTH] IN BLOOD BY AUTOMATED COUNT: 13.3 % (ref 11.5–14.5)
GLUCOSE SERPL-MCNC: 76 MG/DL (ref 65–100)
HCT VFR BLD AUTO: 43.5 % (ref 35–47)
HGB BLD-MCNC: 13.5 G/DL (ref 11.5–16)
IMM GRANULOCYTES # BLD AUTO: 0 K/UL (ref 0–0.04)
IMM GRANULOCYTES NFR BLD AUTO: 0 % (ref 0–0.5)
LYMPHOCYTES # BLD: 2.8 K/UL (ref 0.8–3.5)
LYMPHOCYTES NFR BLD: 28 % (ref 12–49)
MCH RBC QN AUTO: 27.8 PG (ref 26–34)
MCHC RBC AUTO-ENTMCNC: 31 G/DL (ref 30–36.5)
MCV RBC AUTO: 89.5 FL (ref 80–99)
MONOCYTES # BLD: 0.7 K/UL (ref 0–1)
MONOCYTES NFR BLD: 7 % (ref 5–13)
NEUTS SEG # BLD: 6 K/UL (ref 1.8–8)
NEUTS SEG NFR BLD: 60 % (ref 32–75)
NRBC # BLD: 0 K/UL (ref 0–0.01)
NRBC BLD-RTO: 0 PER 100 WBC
PLATELET # BLD AUTO: 362 K/UL (ref 150–400)
PMV BLD AUTO: 9.6 FL (ref 8.9–12.9)
POTASSIUM SERPL-SCNC: 3.6 MMOL/L (ref 3.5–5.1)
RBC # BLD AUTO: 4.86 M/UL (ref 3.8–5.2)
SODIUM SERPL-SCNC: 141 MMOL/L (ref 136–145)
TROPONIN I SERPL-MCNC: <0.05 NG/ML
WBC # BLD AUTO: 10 K/UL (ref 3.6–11)

## 2021-02-05 PROCEDURE — 71045 X-RAY EXAM CHEST 1 VIEW: CPT

## 2021-02-05 PROCEDURE — 99214 OFFICE O/P EST MOD 30 MIN: CPT | Performed by: FAMILY MEDICINE

## 2021-02-05 PROCEDURE — 85379 FIBRIN DEGRADATION QUANT: CPT

## 2021-02-05 PROCEDURE — 84484 ASSAY OF TROPONIN QUANT: CPT

## 2021-02-05 PROCEDURE — 36415 COLL VENOUS BLD VENIPUNCTURE: CPT

## 2021-02-05 PROCEDURE — 80048 BASIC METABOLIC PNL TOTAL CA: CPT

## 2021-02-05 PROCEDURE — 99284 EMERGENCY DEPT VISIT MOD MDM: CPT

## 2021-02-05 PROCEDURE — 93005 ELECTROCARDIOGRAM TRACING: CPT

## 2021-02-05 PROCEDURE — 85025 COMPLETE CBC W/AUTO DIFF WBC: CPT

## 2021-02-05 RX ORDER — LEVOTHYROXINE SODIUM 50 UG/1
TABLET ORAL
COMMUNITY
End: 2021-04-27

## 2021-02-05 RX ORDER — INFLUENZA A VIRUS A/GUANGDONG-MAONAN/SWL1536/2019 CNIC-1909 (H1N1) ANTIGEN (FORMALDEHYDE INACTIVATED), INFLUENZA A VIRUS A/HONG KONG/2671/2019 (H3N2) ANTIGEN (FORMALDEHYDE INACTIVATED), INFLUENZA B VIRUS B/PHUKET/3073/2013 ANTIGEN (FORMALDEHYDE INACTIVATED), AND INFLUENZA B VIRUS B/WASHINGTON/02/2019 ANTIGEN (FORMALDEHYDE INACTIVATED) 15; 15; 15; 15 UG/.5ML; UG/.5ML; UG/.5ML; UG/.5ML
INJECTION, SUSPENSION INTRAMUSCULAR
COMMUNITY
End: 2021-04-08

## 2021-02-05 RX ORDER — ONDANSETRON 4 MG/1
TABLET, ORALLY DISINTEGRATING ORAL
COMMUNITY
End: 2021-10-12 | Stop reason: SDUPTHER

## 2021-02-05 RX ORDER — NORETHINDRONE ACETATE AND ETHINYL ESTRADIOL 1MG-20(24)
KIT ORAL
COMMUNITY
End: 2021-04-27

## 2021-02-05 RX ORDER — ALBUTEROL SULFATE 90 UG/1
1 AEROSOL, METERED RESPIRATORY (INHALATION)
Qty: 1 INHALER | Refills: 0 | Status: SHIPPED | OUTPATIENT
Start: 2021-02-05 | End: 2021-07-01 | Stop reason: ALTCHOICE

## 2021-02-05 NOTE — PROGRESS NOTES
Steve Wheeler is a 27 y.o. female who was seen by synchronous (real-time) audio-video technology on 2/5/2021 for Chest Pain (Onset 2/4/2021 patient stated she was having trouble cathching her breath fels like some pressure on her chest she has a slight cough )        Assessment & Plan:     Diagnoses and all orders for this visit:    1. Abnormality of breathing  -   Mild breathing abnormality per patient. advised to try  albuterol (PROVENTIL HFA, VENTOLIN HFA, PROAIR HFA) 90 mcg/actuation inhaler; Take 1 Puff by inhalation every six (6) hours as needed for Shortness of Breath. Strongly advised to go to ER or urgent care if anything worsen. Advised to get COVID testing if not better. Follow-up and Dispositions    Return if symptoms worsen or fail to improve. 712  Subjective: This is a 28 y/o f with hx of Bipolar disorder is here with a concern about having trouble catching her breath noticed since yesterday. Denies any CP, fever, URI symptoms, wheezing, soa, HA. Had couple of episodes of cough otherwise she no other symptoms. No sick contacts she knows of. Prior to Admission medications    Medication Sig Start Date End Date Taking?  Authorizing Provider   SUMAtriptan (IMITREX) 50 mg tablet TAKE 1 TABLET BY MOUTH AT ONSET OF MIGRAINE (MAY REPEAT 1 IN 2HRS IF HEADACHE REMAINS) 12/31/20  Yes Kenneth Hurt MD   biotin 1 mg cap biotin   Yes Provider, Historical   aMILoride (MIDAMOR) 5 mg tablet amiloride 5 mg tablet   Yes Provider, Historical   fexofenadine (Allegra Allergy) 180 mg tablet Allegra Allergy   Yes Provider, Historical   cholecalciferol, vitamin d3, (Vitamin D3) 10 mcg (400 unit) cap Vitamin D3   Yes Provider, Historical   levothyroxine (Unithroid) 50 mcg tablet Unithroid 50 mcg tablet   1 po qd   Yes Provider, Historical   topiramate (TOPAMAX) 100 mg tablet TAKE 2 TABLETS EVERY DAY 7/30/20  Yes Provider, Historical   topiramate (TOPAMAX) 100 mg tablet topiramate 100 mg tablet Yes Provider, Historical   QUEtiapine (SEROQUEL) 100 mg tablet TAKE 1 AND 1/2 TABLET BY MOUTH EVERY EVENING 11/4/19  Yes Casie Armendariz NP   DULoxetine (CYMBALTA) 60 mg capsule Take 1 Cap by mouth two (2) times a day. Takes 1 cap in the am and 1 cap at night. 11/1/19  Yes Ximena Armendariz NP   lithium carbonate 300 mg capsule Take 1 Cap by mouth two (2) times daily (with meals). 11/1/19  Yes Hayden Cárdenas NP   ergocalciferol (ERGOCALCIFEROL) 50,000 unit capsule Take 1 Cap by mouth every seven (7) days. Indications: Vitamin D Deficiency 7/31/18  Yes Ximena Armendariz NP   JUNEL FE 1/20, 28, 1 mg-20 mcg (21)/75 mg (7) per tablet  8/25/15  Yes Provider, Historical   ibuprofen 100 mg tablet Take 100 mg by mouth every six (6) hours as needed. Yes Other, MD Stacey   ondansetron (ZOFRAN ODT) 4 mg disintegrating tablet ondansetron 4 mg disintegrating tablet   DISSOLVE 1 TAB TWICE A DAY    Provider, Historical   influenza vaccine 2020-21, 6 mos+,,PF, (Fluzone Quad 8890-1596, PF,) syrg injection Fluzone Quad 9144-0436 (PF) 60 mcg (15 mcg x 4)/0.5 mL IM syringe   TO BE ADMINISTERED BY PHARMACIST FOR IMMUNIZATION    Provider, Historical   norethindrone-e estradiol-iron (Blisovi 24 Fe) 1 mg-20 mcg (24)/75 mg (4) tab Blisovi 24 Fe 1 mg-20 mcg (24)/75 mg (4) tablet    Provider, Historical   levothyroxine (Unithroid) 50 mcg tablet Unithroid 50 mcg tablet   TAKE 1 TABLET BY MOUTH EVERY DAY    Provider, Historical   ketorolac (TORADOL) 10 mg tablet  12/30/20   Provider, Historical   promethazine (PHENERGAN) 25 mg tablet Take 1 Tab by mouth every eight (8) hours as needed for Nausea. 12/31/20   Kenneth Hurt MD   butalbital-acetaminophen-caffeine (FIORICET, ESGIC) -40 mg per tablet Take 1 Tab by mouth every eight (8) hours as needed for Headache or Migraine.  12/26/20   Qi Christianson MD   vit B complex 100 no.2/herbs (VITAMIN B COMPLEX 100  2-HERBS PO) vitamin B complex    Provider, Historical   influenza vaccine 2019-20, 4 yrs+,,PF, (Flucelvax Quad 2128-7034, PF,) syrg injection Flucelvax Quad [id number] (PF) 60 mcg (15 mcg x 4)/0.5 mL IM syringe    Provider, Historical   triamcinolone (Nasacort) 55 mcg nasal inhaler 2 Sprays by Both Nostrils route daily. 5/28/20   Janis Allen MD   naproxen (NAPROSYN) 375 mg tablet Take 1 Tab by mouth two (2) times daily (with meals). 5/26/20   Nerissa Dumont MD   mupirocin (BACTROBAN) 2 % ointment Apply  to affected area daily. 4/28/20   Mceca Fitzgerald NP     Patient Active Problem List   Diagnosis Code    Low serum vitamin D R79.89    Iron deficiency anemia D50.9    Bipolar 1 disorder ([facility] [address] F31.9    Obesity (BMI 30-39. 9) E66.9    Fibromyalgia M79.7     Current Outpatient Medications   Medication Sig Dispense Refill    albuterol (PROVENTIL HFA, VENTOLIN HFA, PROAIR HFA) 90 mcg/actuation inhaler Take 1 Puff by inhalation every six (6) hours as needed for Shortness of Breath. 1 Inhaler 0    SUMAtriptan (IMITREX) 50 mg tablet TAKE 1 TABLET BY MOUTH AT ONSET OF MIGRAINE (MAY REPEAT 1 IN 2HRS IF HEADACHE REMAINS) 9 Tab 1    biotin 1 mg cap biotin      aMILoride (MIDAMOR) 5 mg tablet amiloride 5 mg tablet      fexofenadine (Allegra Allergy) 180 mg tablet Allegra Allergy      cholecalciferol, vitamin d3, (Vitamin D3) 10 mcg (400 unit) cap Vitamin D3      levothyroxine (Unithroid) 50 mcg tablet Unithroid 50 mcg tablet   1 po qd      topiramate (TOPAMAX) 100 mg tablet TAKE 2 TABLETS EVERY DAY      topiramate (TOPAMAX) 100 mg tablet topiramate 100 mg tablet      QUEtiapine (SEROQUEL) 100 mg tablet TAKE 1 AND 1/2 TABLET BY MOUTH EVERY EVENING 45 Tab 2    DULoxetine (CYMBALTA) 60 mg capsule Take 1 Cap by mouth two (2) times a day. Takes 1 cap in the am and 1 cap at night. 60 Cap 2    lithium carbonate 300 mg capsule Take 1 Cap by mouth two (2) times daily (with meals).  60 Cap 2    ergocalciferol (ERGOCALCIFEROL) 50,000 unit capsule Take 1 Cap by mouth every seven (7) days. Indications: Vitamin D Deficiency 12 Cap 3    JUNEL FE 1/20, 28, 1 mg-20 mcg (21)/75 mg (7) per tablet       ibuprofen 100 mg tablet Take 100 mg by mouth every six (6) hours as needed.  ondansetron (ZOFRAN ODT) 4 mg disintegrating tablet ondansetron 4 mg disintegrating tablet   DISSOLVE 1 TAB TWICE A DAY      influenza vaccine 2020-21, 6 mos+,,PF, (Fluzone Quad 3982-4566, PF,) syrg injection Fluzone Quad 9837-3563 (PF) 60 mcg (15 mcg x 4)/0.5 mL IM syringe   TO BE ADMINISTERED BY PHARMACIST FOR IMMUNIZATION      norethindrone-e estradiol-iron (Blisovi 24 Fe) 1 mg-20 mcg (24)/75 mg (4) tab Blisovi 24 Fe 1 mg-20 mcg (24)/75 mg (4) tablet      levothyroxine (Unithroid) 50 mcg tablet Unithroid 50 mcg tablet   TAKE 1 TABLET BY MOUTH EVERY DAY      ketorolac (TORADOL) 10 mg tablet       promethazine (PHENERGAN) 25 mg tablet Take 1 Tab by mouth every eight (8) hours as needed for Nausea. 15 Tab 0    butalbital-acetaminophen-caffeine (FIORICET, ESGIC) -40 mg per tablet Take 1 Tab by mouth every eight (8) hours as needed for Headache or Migraine. 20 Tab 0    vit B complex 100 no.2/herbs (VITAMIN B COMPLEX 100  2-HERBS PO) vitamin B complex      influenza vaccine 2019-20, 4 yrs+,,PF, (Flucelvax Quad 2201-8950, PF,) syrg injection Flucelvax Quad 2913-7238 (PF) 60 mcg (15 mcg x 4)/0.5 mL IM syringe      triamcinolone (Nasacort) 55 mcg nasal inhaler 2 Sprays by Both Nostrils route daily.  naproxen (NAPROSYN) 375 mg tablet Take 1 Tab by mouth two (2) times daily (with meals). 30 Tab 0    mupirocin (BACTROBAN) 2 % ointment Apply  to affected area daily.  30 g 1     Allergies   Allergen Reactions    Coconut Rash    Pcn [Penicillins] Hives    Soy Hives     Past Medical History:   Diagnosis Date    Anemia     Anxiety     Arm fracture     Autism 12/27/2020    Bipolar 1 disorder (HCC)     Depression     Fibromyalgia     IBS (irritable bowel syndrome)     Migraine ROS    Objective:     Patient-Reported Vitals 8/31/2020   Patient-Reported LMP June 22, 2020      General: alert, cooperative, no distress. Breathing normal.   Mental  status: normal mood, behavior, speech, dress, motor activity, and thought processes, able to follow commands   HENT: NCAT   Neck: no visualized mass   Resp: no respiratory distress   Neuro: no gross deficits   Skin: no discoloration or lesions of concern on visible areas   Psychiatric: normal affect, consistent with stated mood, no evidence of hallucinations     Additional exam findings: We discussed the expected course, resolution and complications of the diagnosis(es) in detail. Medication risks, benefits, costs, interactions, and alternatives were discussed as indicated. I advised her to contact the office if her condition worsens, changes or fails to improve as anticipated. She expressed understanding with the diagnosis(es) and plan. Franky Gunn, who was evaluated through a patient-initiated, synchronous (real-time) audio-video encounter, and/or her healthcare decision maker, is aware that it is a billable service, with coverage as determined by her insurance carrier. She provided verbal consent to proceed: Yes, and patient identification was verified. It was conducted pursuant to the emergency declaration under the [address] [place] [address] authority and the [employer] Act. A caregiver was present when appropriate. Ability to conduct physical exam was limited. I was in the office. The patient was at home.       Ana Dowd MD

## 2021-02-05 NOTE — PROGRESS NOTES
VV-Pt is aware of billable appt depending on insurance plan. Preferred number  Pt verbally agrees to labs, orders, and others to be mailed to confirmed home address. Identified pt with two pt identifiers(name and ). Reviewed record in preparation for visit and have obtained necessary documentation. All patient medications has been reviewed. No chief complaint on file. Health Maintenance Due   Topic    PAP AKA CERVICAL CYTOLOGY      Health Maintenance Review: Patient reminded of \"due or due soon\" health maintenance. I have asked the patient to contact his/her primary care provider (PCP) for follow-up on his/her health maintenance. Wt Readings from Last 3 Encounters:   19 167 lb (75.8 kg)   19 169 lb 9.6 oz (76.9 kg)   18 162 lb 12.8 oz (73.8 kg)     Temp Readings from Last 3 Encounters:   20 98.6 °F (37 °C)   20 99.1 °F (37.3 °C)   19 98 °F (36.7 °C) (Oral)     BP Readings from Last 3 Encounters:   19 106/78   19 128/84   18 110/78     Pulse Readings from Last 3 Encounters:   20 (!) 102   20 (!) 104   19 91         Coordination of Care Questionnaire:   1) Have you been to an emergency room, urgent care, or hospitalized since your last visit? No  2. Have seen or consulted any other health care provider since your last visit?   No

## 2021-02-05 NOTE — Clinical Note
[address] 
[facility] 
[address] [address] [phone] Work/School Note Date: 2/5/2021 To Whom It May concern: 
 
Margoth Ambrose was evaulated by the following provider(s): 
Attending Provider: Yeimy Yanez[address] [place] [address] virus is suspected. Per the CDC guidelines we recommend home isolation until the following conditions are all met: 1. At least 10 days have passed since symptoms first appeared and 2. At least 24 hours have passed since last fever without the use of fever-reducing medications and 
3. Symptoms (e.g., cough, shortness of breath) have improved Sincerely, 
 
 
 
 
Osiris Juarez MD

## 2021-02-06 VITALS
SYSTOLIC BLOOD PRESSURE: 112 MMHG | HEART RATE: 77 BPM | RESPIRATION RATE: 16 BRPM | WEIGHT: 174.16 LBS | BODY MASS INDEX: 31.85 KG/M2 | OXYGEN SATURATION: 100 % | TEMPERATURE: 97.7 F | DIASTOLIC BLOOD PRESSURE: 82 MMHG

## 2021-02-06 LAB
ATRIAL RATE: 89 BPM
CALCULATED P AXIS, ECG09: 45 DEGREES
CALCULATED R AXIS, ECG10: 25 DEGREES
CALCULATED T AXIS, ECG11: 24 DEGREES
DIAGNOSIS, 93000: NORMAL
P-R INTERVAL, ECG05: 162 MS
Q-T INTERVAL, ECG07: 366 MS
QRS DURATION, ECG06: 76 MS
QTC CALCULATION (BEZET), ECG08: 445 MS
VENTRICULAR RATE, ECG03: 89 BPM

## 2021-02-06 NOTE — ED PROVIDER NOTES
58-year-old female presenting the ER with URI-like symptoms and nonproductive cough with pleuritic chest pain that started last night. 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 No fevers or chills. Patient has fibromyalgia and is unsure if she is having muscle aches. No loss of taste or smell. [facility] and had labs that were unremarkable however due to her history of chronic OCP use concern for possible PE advised come the ER for evaluation. Patient had rapid Covid test that was negative and they sent Covid PCR test that is pending. Mild nausea but no vomiting had some loose stools. No dysuria or increased urinary frequency and [facility] negative [facility] test at [facility].  No history of DVT or PE. Past Medical History:   Diagnosis Date    Anemia     Anxiety     Arm fracture     Autism 12/27/2020    Bipolar 1 disorder (HCC)     Depression     Fibromyalgia     IBS (irritable bowel syndrome)     Migraine        History reviewed. No pertinent surgical history.       Family History:   Problem Relation Age of Onset    Depression Mother     Depression Father     Heart Disease Maternal Grandmother     Depression Maternal Grandmother     Cancer Maternal Grandfather         brain cancer    Diabetes Maternal Grandfather     Hypertension Maternal Grandfather     Stroke Maternal Grandfather     Cancer Paternal Grandfather        Social History     Socioeconomic History    Marital status: SINGLE     Spouse name: Not on file    Number of children: Not on file    Years of education: Not on file    Highest education level: Not on file   Occupational History    Not on file   Social Needs    Financial resource strain: Not on file    Food insecurity     Worry: Not on file     Inability: Not on file    Transportation needs     Medical: Not on file     Non-medical: Not on file   Tobacco Use    Smoking status: Never Smoker    Smokeless tobacco: Never Used   Substance and Sexual Activity    Alcohol use: Never     Frequency: Never    Drug use: No    Sexual activity: Yes     Partners: Female     Birth control/protection: Pill   Lifestyle    Physical activity     Days per week: Not on file     Minutes per session: Not on file    Stress: Not on file   Relationships    Social connections     Talks on phone: Not on file     Gets together: Not on file     Attends [employer] service: Not on file     Active member of club or organization: Not on file     Attends meetings of clubs or organizations: Not on file     Relationship status: Not on file    Intimate partner violence     Fear of current or ex partner: Not on file     Emotionally abused: Not on file     Physically abused: Not on file     Forced sexual activity: Not on file   Other Topics Concern    Not on file   Social History Narrative    Not on file         ALLERGIES: Coconut, Pcn [penicillins], and Soy    Review of Systems   Constitutional: Positive for fatigue. Negative for chills and fever. HENT: Positive for congestion. Negative for sore throat. Eyes: Negative for pain. Respiratory: Positive for cough and shortness of breath. Cardiovascular: Positive for chest pain. Gastrointestinal: Negative for abdominal pain, diarrhea, nausea and vomiting. Genitourinary: Negative for dysuria and flank pain. Musculoskeletal: Negative for back pain and neck pain. Skin: Negative for rash. Neurological: Negative for dizziness and headaches. Vitals:    02/05/21 2204   BP: (!) 153/102   Pulse: 95   Resp: 20   Temp: 97.7 °F (36.5 °C)   SpO2: 99%   Weight: 79 kg (174 lb 2.6 oz)            Physical Exam  Constitutional:       Appearance: She is well-developed. HENT:      Head: Normocephalic. Eyes:      Conjunctiva/sclera: Conjunctivae normal.   Neck:      Musculoskeletal: Normal range of motion and neck supple. Cardiovascular:      Rate and Rhythm: Normal rate and regular rhythm. Heart sounds: No murmur. Pulmonary:      Effort: Pulmonary effort is normal. No respiratory distress. Breath sounds: Normal breath sounds. Abdominal:      General: Bowel sounds are normal.      Palpations: Abdomen is soft. Tenderness: There is no abdominal tenderness. Musculoskeletal: Normal range of motion. Skin:     General: Skin is warm. Capillary Refill: Capillary refill takes less than 2 seconds. Findings: No rash. Neurological:      Mental Status: She is alert and oriented to person, place, and time. Comments: No gross motor or sensory deficits          MDM  Number of Diagnoses or Management Options  Diagnosis management comments: Patient [facility]  Patient also having PCR test for COVID-19 pending. With URI-like symptoms and nonproductive cough. Lungs are clear satting well. Chest x-ray clear. D-dimer negative and troponin negative normal EKG. Discussed the patient symptoms likely result of viral illness discussed symptomatic treatment and isolation at home. She currently has prescription for albuterol from her PCP. Discussed the discharge impression and any labs and the results with the patient. Answered any questions and addressed any concerns. Discussed the importance of following up with their primary care provider and/or specialist.  Discussed signs or symptoms that would warrant return back to the ER for further evaluation. The patient is agreeable with discharge. Amount and/or Complexity of Data Reviewed  Clinical lab tests: reviewed  Tests in the radiology section of CPT®: reviewed      ED Course as of Feb 05 2256 Fri Feb 05, 2021 2216 10:16 PM  EKG: NSR. Rate 89. Normal intervals, normal axis, no ST-elevations or depressions. No signs of ischemia.   Interpreted by Stephanie Stephen MD          [ZD]      ED Course User Index  [ZD] Roseanne Leos MD       Procedures      Recent Results (from the past 24 hour(s))   METABOLIC PANEL, BASIC    Collection Time: 02/05/21 10:11 PM   Result Value Ref Range    Sodium 141 136 - 145 mmol/L    Potassium 3.6 3.5 - 5.1 mmol/L    Chloride 107 97 - 108 mmol/L    CO2 21 21 - 32 mmol/L    Anion gap 13 5 - 15 mmol/L    Glucose 76 65 - 100 mg/dL    BUN 11 6 - 20 MG/DL    Creatinine 0.76 0.55 - 1.02 MG/DL    BUN/Creatinine ratio 14 12 - 20      GFR est AA >60 >60 ml/min/1.73m2    GFR est non-AA >60 >60 ml/min/1.73m2    Calcium 9.0 8.5 - 10.1 MG/DL   CBC WITH AUTOMATED DIFF    Collection Time: 02/05/21 10:11 PM   Result Value Ref Range    WBC 10.0 3.6 - 11.0 K/uL    RBC 4.86 3.80 - 5.20 M/uL    HGB 13.5 11.5 - 16.0 g/dL    HCT 43.5 35.0 - 47.0 %    MCV 89.5 80.0 - 99.0 FL    MCH 27.8 26.0 - 34.0 PG    MCHC 31.0 30.0 - 36.5 g/dL    RDW 13.3 11.5 - 14.5 %    PLATELET 079 749 - 200 K/uL    MPV 9.6 8.9 - 12.9 FL    NRBC 0.0 0  WBC    ABSOLUTE NRBC 0.00 0.00 - 0.01 K/uL    NEUTROPHILS 60 32 - 75 %    LYMPHOCYTES 28 12 - 49 %    MONOCYTES 7 5 - 13 %    EOSINOPHILS 4 0 - 7 %    BASOPHILS 1 0 - 1 %    IMMATURE GRANULOCYTES 0 0.0 - 0.5 %    ABS. NEUTROPHILS 6.0 1.8 - 8.0 K/UL    ABS. LYMPHOCYTES 2.8 0.8 - 3.5 K/UL    ABS. MONOCYTES 0.7 0.0 - 1.0 K/UL    ABS. EOSINOPHILS 0.4 0.0 - 0.4 K/UL    ABS. BASOPHILS 0.1 0.0 - 0.1 K/UL    ABS. IMM. GRANS. 0.0 0.00 - 0.04 K/UL    DF AUTOMATED     D DIMER    Collection Time: 02/05/21 10:11 PM   Result Value Ref Range    D-dimer <0.19 0.00 - 0.65 mg/L FEU   TROPONIN I    Collection Time: 02/05/21 10:11 PM   Result Value Ref Range    Troponin-I, Qt. <0.05 <0.05 ng/mL       Xr Chest Port    Result Date: 2/5/2021  Clinical history: sob, cough INDICATION:   sob, cough COMPARISON: 1/8/2015 FINDINGS: AP portable upright view of the chest demonstrates a stable  cardiopericardial silhouette. There is no pleural effusion. .There is no focal consolidation. .There is no pneumothorax. Chaz Morrow No acute process identified.

## 2021-02-06 NOTE — ED TRIAGE NOTES
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 Patient was seen at [facility] and was sent here for a possible PE due to long use of birth control.

## 2021-02-10 ENCOUNTER — VIRTUAL VISIT (OUTPATIENT)
Dept: PRIMARY CARE CLINIC | Age: 26
End: 2021-02-10
Payer: MEDICAID

## 2021-02-10 DIAGNOSIS — R07.89 COSTOCHONDRAL CHEST PAIN: ICD-10-CM

## 2021-02-10 DIAGNOSIS — R07.89 ACUTE CHEST WALL PAIN: Primary | ICD-10-CM

## 2021-02-10 PROCEDURE — 99214 OFFICE O/P EST MOD 30 MIN: CPT | Performed by: FAMILY MEDICINE

## 2021-02-10 RX ORDER — IBUPROFEN 600 MG/1
600 TABLET ORAL 2 TIMES DAILY WITH MEALS
Qty: 20 TAB | Refills: 0 | Status: SHIPPED | OUTPATIENT
Start: 2021-02-10 | End: 2021-09-24

## 2021-02-10 NOTE — PROGRESS NOTES
Chief Complaint   Patient presents with    Shortness of Breath     with c/o chest pain, sob and cough, not feeling better, patient was at the ER on 02/05/2021 DX with viral syndrome.  Chest Pain     [id number] testing was done with negative results. 1. Have you been to the ER, urgent care clinic since your last visit? Hospitalized since your last visit? No    2. Have you seen or consulted any other health care providers outside of the [address] [place] [address] since your last visit? Include any pap smears or colon screening.  No

## 2021-02-10 NOTE — PROGRESS NOTES
Aaron Lawrence is a 27 y.o. female who was seen by synchronous (real-time) audio-video technology on 2/10/2021 for Shortness of Breath (with c/o chest pain, sob and cough, not feeling better, patient was at the ER on 02/05/2021 DX with viral syndrome.) and Chest Pain (MIXIQ76 testing was done with negative results.)        Assessment & Plan:   Diagnoses and all orders for this visit:    1. Acute chest wall pain  -    Patient had negative cardiac work up as well as negative for PE. Pt c/o chest wall pain as well as pain with deep inspiration. Costochondritis? Pleurisy? Anxiety? Advised to start ibuprofen (MOTRIN) 600 mg tablet; Take 1 Tab by mouth two (2) times daily (with meals). 2. Costochondral chest pain  -     ibuprofen (MOTRIN) 600 mg tablet; Take 1 Tab by mouth two (2) times daily (with meals). Follow-up and Dispositions    · Return if symptoms worsen or fail to improve. Subjective: This is a 28 y/o F with hx of bipolar disorder is here with a c/o chest pain and feeling soa X 1 week. She reports that for the past one week she has been having trouble catching her breath. Initially she had HA which is gone. [facility] where she had negative rapid COVID test . Her three days COVID test also came back negative. [facility] was concern about PE so she was sent to ER. [facility] where D dimer, xray, troponin test all came back negative. She reports that albuterol did not help. Patient reports that having pain in her chest wall when she press on it and taking deep breath hurts her chest. Denies any fever, wheezing, diarrhea, abd pain. Very mild dry cough. ER records reviewed. Prior to Admission medications    Medication Sig Start Date End Date Taking?  Authorizing Provider   ondansetron (ZOFRAN ODT) 4 mg disintegrating tablet ondansetron 4 mg disintegrating tablet   DISSOLVE 1 TAB TWICE A DAY   Yes Provider, Historical   influenza vaccine 2020-21, 6 mos+,,PF, (Fluzone Quad 9257-6637, PF,) syrg injection Fluzone Quad 8932-0339 (PF) 60 mcg (15 mcg x 4)/0.5 mL IM syringe   TO BE ADMINISTERED BY PHARMACIST FOR IMMUNIZATION   Yes Provider, Historical   norethindrone-e estradiol-iron (Blisovi 24 Fe) 1 mg-20 mcg (24)/75 mg (4) tab Blisovi 24 Fe 1 mg-20 mcg (24)/75 mg (4) tablet   Yes Provider, Historical   levothyroxine (Unithroid) 50 mcg tablet Unithroid 50 mcg tablet   TAKE 1 TABLET BY MOUTH EVERY DAY   Yes Provider, Historical   albuterol (PROVENTIL HFA, VENTOLIN HFA, PROAIR HFA) 90 mcg/actuation inhaler Take 1 Puff by inhalation every six (6) hours as needed for Shortness of Breath. 2/5/21  Yes Kenneth Hurt MD   ketorolac (TORADOL) 10 mg tablet  12/30/20  Yes Provider, Historical   SUMAtriptan (IMITREX) 50 mg tablet TAKE 1 TABLET BY MOUTH AT ONSET OF MIGRAINE (MAY REPEAT 1 IN 2HRS IF HEADACHE REMAINS) 12/31/20  Yes Kenneth Hurt MD   promethazine (PHENERGAN) 25 mg tablet Take 1 Tab by mouth every eight (8) hours as needed for Nausea.  12/31/20  Yes Kenneth Hurt MD   vit B complex 100 no.2/herbs (VITAMIN B COMPLEX 100  2-HERBS PO) vitamin B complex   Yes Provider, Historical   biotin 1 mg cap biotin   Yes Provider, Historical   aMILoride (MIDAMOR) 5 mg tablet amiloride 5 mg tablet   Yes Provider, Historical   fexofenadine (Allegra Allergy) 180 mg tablet Allegra Allergy   Yes Provider, Historical   influenza vaccine 2019-20, 4 yrs+,,PF, (Flucelvax Quad 0137-1293, PF,) syrg injection Flucelvax Quad 1489-0992 (PF) 60 mcg (15 mcg x 4)/0.5 mL IM syringe   Yes Provider, Historical   cholecalciferol, vitamin d3, (Vitamin D3) 10 mcg (400 unit) cap Vitamin D3   Yes Provider, Historical   levothyroxine (Unithroid) 50 mcg tablet Unithroid 50 mcg tablet   1 po qd   Yes Provider, Historical   topiramate (TOPAMAX) 100 mg tablet TAKE 2 TABLETS EVERY DAY 7/30/20  Yes Provider, Historical   topiramate (TOPAMAX) 100 mg tablet topiramate 100 mg tablet   Yes Provider, Historical   triamcinolone (Nasacort) 55 mcg nasal inhaler 2 Sprays by Both Nostrils route daily. 5/28/20  Yes Slime Allen MD   naproxen (NAPROSYN) 375 mg tablet Take 1 Tab by mouth two (2) times daily (with meals). 5/26/20  Yes Miko Aleman MD   mupirocin (BACTROBAN) 2 % ointment Apply  to affected area daily. 4/28/20  Yes Sabi Armendariz NP   QUEtiapine (SEROQUEL) 100 mg tablet TAKE 1 AND 1/2 TABLET BY MOUTH EVERY EVENING 11/4/19  Yes Casie Armendariz NP   DULoxetine (CYMBALTA) 60 mg capsule Take 1 Cap by mouth two (2) times a day. Takes 1 cap in the am and 1 cap at night. 11/1/19  Yes Sabi Armendariz NP   lithium carbonate 300 mg capsule Take 1 Cap by mouth two (2) times daily (with meals). 11/1/19  Yes Estevan Astudillo NP   ergocalciferol (ERGOCALCIFEROL) 50,000 unit capsule Take 1 Cap by mouth every seven (7) days. Indications: Vitamin D Deficiency 7/31/18  Yes Sabi Armendariz NP   JUNEL FE 1/20, 28, 1 mg-20 mcg (21)/75 mg (7) per tablet  8/25/15  Yes Provider, Historical   ibuprofen 100 mg tablet Take 100 mg by mouth every six (6) hours as needed. Yes Other, MD Stacey   butalbital-acetaminophen-caffeine (FIORICET, ESGIC) -40 mg per tablet Take 1 Tab by mouth every eight (8) hours as needed for Headache or Migraine. 12/26/20   Miko Aleman MD     Patient Active Problem List   Diagnosis Code    Low serum vitamin D R79.89    Iron deficiency anemia D50.9    Bipolar 1 disorder ([facility] [address] F31.9    Obesity (BMI 30-39. 9) E66.9    Fibromyalgia M79.7     Current Outpatient Medications   Medication Sig Dispense Refill    ibuprofen (MOTRIN) 600 mg tablet Take 1 Tab by mouth two (2) times daily (with meals).  20 Tab 0    ondansetron (ZOFRAN ODT) 4 mg disintegrating tablet ondansetron 4 mg disintegrating tablet   DISSOLVE 1 TAB TWICE A DAY      influenza vaccine 2020-21, 6 mos+,,PF, (Fluzone Quad 2996-6732, PF,) syrg injection Fluzone Quad 3817-3238 (PF) 60 mcg (15 mcg x 4)/0.5 mL IM syringe   TO BE ADMINISTERED BY PHARMACIST FOR IMMUNIZATION      norethindrone-e estradiol-iron (Blisovi 24 Fe) 1 mg-20 mcg (24)/75 mg (4) tab Blisovi 24 Fe 1 mg-20 mcg (24)/75 mg (4) tablet      levothyroxine (Unithroid) 50 mcg tablet Unithroid 50 mcg tablet   TAKE 1 TABLET BY MOUTH EVERY DAY      albuterol (PROVENTIL HFA, VENTOLIN HFA, PROAIR HFA) 90 mcg/actuation inhaler Take 1 Puff by inhalation every six (6) hours as needed for Shortness of Breath. 1 Inhaler 0    SUMAtriptan (IMITREX) 50 mg tablet TAKE 1 TABLET BY MOUTH AT ONSET OF MIGRAINE (MAY REPEAT 1 IN 2HRS IF HEADACHE REMAINS) 9 Tab 1    promethazine (PHENERGAN) 25 mg tablet Take 1 Tab by mouth every eight (8) hours as needed for Nausea. 15 Tab 0    vit B complex 100 no.2/herbs (VITAMIN B COMPLEX 100  2-HERBS PO) vitamin B complex      biotin 1 mg cap biotin      aMILoride (MIDAMOR) 5 mg tablet amiloride 5 mg tablet      fexofenadine (Allegra Allergy) 180 mg tablet Allegra Allergy      influenza vaccine 2019-20, 4 yrs+,,PF, (Flucelvax Quad 5294-1197, PF,) syrg injection Flucelvax Quad 3152-8098 (PF) 60 mcg (15 mcg x 4)/0.5 mL IM syringe      cholecalciferol, vitamin d3, (Vitamin D3) 10 mcg (400 unit) cap Vitamin D3      levothyroxine (Unithroid) 50 mcg tablet Unithroid 50 mcg tablet   1 po qd      topiramate (TOPAMAX) 100 mg tablet TAKE 2 TABLETS EVERY DAY      topiramate (TOPAMAX) 100 mg tablet topiramate 100 mg tablet      triamcinolone (Nasacort) 55 mcg nasal inhaler 2 Sprays by Both Nostrils route daily.  naproxen (NAPROSYN) 375 mg tablet Take 1 Tab by mouth two (2) times daily (with meals). 30 Tab 0    mupirocin (BACTROBAN) 2 % ointment Apply  to affected area daily. 30 g 1    QUEtiapine (SEROQUEL) 100 mg tablet TAKE 1 AND 1/2 TABLET BY MOUTH EVERY EVENING 45 Tab 2    DULoxetine (CYMBALTA) 60 mg capsule Take 1 Cap by mouth two (2) times a day. Takes 1 cap in the am and 1 cap at night.  60 Cap 2    lithium carbonate 300 mg capsule Take 1 Cap by mouth two (2) times daily (with meals). 60 Cap 2    ergocalciferol (ERGOCALCIFEROL) 50,000 unit capsule Take 1 Cap by mouth every seven (7) days. Indications: Vitamin D Deficiency 12 Cap 3    JUNEL FE 1/20, 28, 1 mg-20 mcg (21)/75 mg (7) per tablet       butalbital-acetaminophen-caffeine (FIORICET, ESGIC) -40 mg per tablet Take 1 Tab by mouth every eight (8) hours as needed for Headache or Migraine.  20 Tab 0     Allergies   Allergen Reactions    Coconut Rash    Pcn [Penicillins] Hives    Soy Hives     Past Medical History:   Diagnosis Date    Anemia     Anxiety     Arm fracture     Autism 12/27/2020    Bipolar 1 disorder (HCC)     Depression     Fibromyalgia     IBS (irritable bowel syndrome)     Migraine        ROS    Objective:     Patient-Reported Vitals 2/10/2021   Patient-Reported Pulse 110   Patient-Reported Temperature 97.1   Patient-Reported SpO2 99   Patient-Reported LMP -        [INSTRUCTIONS:  \"[x]\" Indicates a positive item  \"[]\" Indicates a negative item  -- DELETE ALL ITEMS NOT EXAMINED]    Constitutional: [x] Appears well-developed and well-nourished [x] No apparent distress      [] Abnormal -     Mental status: [x] Alert and awake  [x] Oriented to person/place/time [x] Able to follow commands    [] Abnormal -     Eyes:   EOM    [x]  Normal    [] Abnormal -   Sclera  [x]  Normal    [] Abnormal -          Discharge [x]  None visible   [] Abnormal -     HENT: [x] Normocephalic, atraumatic  [] Abnormal -   [x] Mouth/Throat: Mucous membranes are moist    External Ears [x] Normal  [] Abnormal -    Neck: [x] No visualized mass [] Abnormal -     Pulmonary/Chest: [x] Respiratory effort normal   [x] No visualized signs of difficulty breathing or respiratory distress        [] Abnormal -      Musculoskeletal:   [x] Normal gait with no signs of ataxia         [x] Normal range of motion of neck        [] Abnormal -     Neurological:        [x] No Facial Asymmetry (Cranial nerve 7 motor function) (limited exam due to video visit)          [x] No gaze palsy        [] Abnormal -          Skin:        [x] No significant exanthematous lesions or discoloration noted on facial skin         [] Abnormal -            Psychiatric:       [x] Normal Affect [] Abnormal -        [x] No Hallucinations    Other pertinent observable physical exam findings:-        We discussed the expected course, resolution and complications of the diagnosis(es) in detail. Medication risks, benefits, costs, interactions, and alternatives were discussed as indicated. I advised her to contact the office if her condition worsens, changes or fails to improve as anticipated. She expressed understanding with the diagnosis(es) and plan. Ziyad Rossi, who was evaluated through a patient-initiated, synchronous (real-time) audio-video encounter, and/or her healthcare decision maker, is aware that it is a billable service, with coverage as determined by her insurance carrier. She provided verbal consent to proceed: Yes, and patient identification was verified. It was conducted pursuant to the emergency declaration under the [address] [place] [address], [address] [place] [address] authority and the [employer] Act. A caregiver was present when appropriate. Ability to conduct physical exam was limited. I was in the office. The patient was at home.       Kirk Wei MD

## 2021-02-11 ENCOUNTER — TELEPHONE (OUTPATIENT)
Dept: PRIMARY CARE CLINIC | Age: 26
End: 2021-02-11

## 2021-02-11 NOTE — TELEPHONE ENCOUNTER
Patient called and stated she is feeling better and would like a return to work note. Stated her job told her she needs to bring in her negative results along with this note. [facility] and they couldn't just print out her results and they wouldn't release the patient back to work due to their policy is telling patient to quarantine still for 14 days even after a negative test result and that it could be a false negative. She would like to know if we can print her her results and give her a release to go back to work note.

## 2021-02-11 NOTE — TELEPHONE ENCOUNTER
So we can not print her results since we did not do them. Would you be willing to write a note for the patient before I call her, you saw her yesterday.

## 2021-02-11 NOTE — TELEPHONE ENCOUNTER
You can call [facility] and ask about her result fax over here now that way I can give her return to work note.

## 2021-02-12 ENCOUNTER — TELEPHONE (OUTPATIENT)
Dept: PRIMARY CARE CLINIC | Age: 26
End: 2021-02-12

## 2021-02-12 NOTE — TELEPHONE ENCOUNTER
Letter in chart. She can print the letter from my chart under letter section. Please ask  to scan the negative test result in chart. Thanks.

## 2021-02-12 NOTE — TELEPHONE ENCOUNTER
----- Message from Caroline Olmedo sent at 2/11/2021  5:34 PM EST -----  Regarding: Dr. Kelsey Ag: [phone]  General Message/Vendor Calls    Caller's first and last name: N/A      Reason for call: Requesting note to return to work. Callback required yes/no and why: yes/update once work note available. Best contact number(s): [phone]      Details to clarify the request: Advised PT to have [facility] fax negative COVID-19 results. PT stated she would have them fax results right away to [phone].        Caroline Granadosraghavendra

## 2021-02-12 NOTE — TELEPHONE ENCOUNTER
We received the fax from [facility] with the negative covid test. Would you be able to write the letter now.

## 2021-02-12 NOTE — LETTER
NOTIFICATION RETURN TO WORK  
 
2/12/2021 10:45 AM 
 
Ms. [address] [address] To Whom It May Concern: 
 
Tushar Hernández is currently under the care of Romina Nguyễn. She will return to work on 2/12/2021 If there are questions or concerns please have the patient contact our office.  
 
 
 
Sincerely, 
 
 
Verónica Valencia MD

## 2021-02-24 ENCOUNTER — VIRTUAL VISIT (OUTPATIENT)
Dept: PRIMARY CARE CLINIC | Age: 26
End: 2021-02-24
Payer: MEDICAID

## 2021-02-24 DIAGNOSIS — R51.9 HEADACHE DISORDER: ICD-10-CM

## 2021-02-24 DIAGNOSIS — R53.83 FATIGUE, UNSPECIFIED TYPE: ICD-10-CM

## 2021-02-24 DIAGNOSIS — Z20.822 EXPOSURE TO COVID-19 VIRUS: Primary | ICD-10-CM

## 2021-02-24 PROCEDURE — 99213 OFFICE O/P EST LOW 20 MIN: CPT | Performed by: FAMILY MEDICINE
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
## 2021-02-24 NOTE — LETTER
[address] [facility] 
[place] [address] [id number] [facility] [id number] Work/School Note Date: 2/24/2021 To Whom It May concern: 
 
Jake Dumont was evaulated by me COVID19 virus is suspected. Per the CDC guidelines we recommend home isolation until the following conditions are all met: 1. At least 10 days have passed since symptoms first appeared . Advised to have COVID test done on 2/26/2021. She can go back to work if test is negative and symptom free.  
 
Sincerely, 
 
 
 
 
Gudelia Gage MD

## 2021-02-24 NOTE — PROGRESS NOTES
Franky Gunn is a 27 y.o. female who was seen by synchronous (real-time) audio-video technology on 2/24/2021 for Hospital Follow Up (Onset 2/23/2021 [facility] patient stated she was having a headach  patient stated that a friend's Roshan Correa was  exposed)        Assessment & Plan:   Diagnoses and all orders for this visit:    1. Exposure to COVID-19 virus     Patient had COVID test done 3 days after exposure and I think its too early to test. Patient has been feeling very tired otherwise she is asymptomatic. 2. Headache disorder      Resolved. 3. Fatigue, unspecified type      Follow CDC guideline. Stay quarantine for 10 days after exposure and symptoms free. Advised to get COVID test done on 2/26//2021. Counseling provided. Work letter provided. Follow-up and Dispositions    Return if symptoms worsen or fail to improve. 712  Subjective: This is a 26 y/o f is here with a concern about COVID exposure. She reports that she was exposed to [employer] on 2/20/2021 when she went to her friend's house . She found that her friend's  was diagnosed with COVID on 2/23/2021. She reports having HA so she went to [facility] on 2/23/2021 where she had a negative COVID test.  Denies any cough, chest pain, soa. HA has completely resolved but feels very tired. Prior to Admission medications    Medication Sig Start Date End Date Taking? Authorizing Provider   ibuprofen (MOTRIN) 600 mg tablet Take 1 Tab by mouth two (2) times daily (with meals).  2/10/21  Yes Kenneth Hurt MD   ondansetron (ZOFRAN ODT) 4 mg disintegrating tablet ondansetron 4 mg disintegrating tablet   DISSOLVE 1 TAB TWICE A DAY   Yes Provider, Historical   levothyroxine (Unithroid) 50 mcg tablet Unithroid 50 mcg tablet   TAKE 1 TABLET BY MOUTH EVERY DAY   Yes Provider, Historical   albuterol (PROVENTIL HFA, VENTOLIN HFA, PROAIR HFA) 90 mcg/actuation inhaler Take 1 Puff by inhalation every six (6) hours as needed for Shortness of Breath. 2/5/21  Yes Kenneth Hurt MD   SUMAtriptan (IMITREX) 50 mg tablet TAKE 1 TABLET BY MOUTH AT ONSET OF MIGRAINE (MAY REPEAT 1 IN 2HRS IF HEADACHE REMAINS) 12/31/20  Yes Kenneth Hurt MD   butalbital-acetaminophen-caffeine (FIORICET, ESGIC) -40 mg per tablet Take 1 Tab by mouth every eight (8) hours as needed for Headache or Migraine. 12/26/20  Yes Bryn Lott MD   vit B complex 100 no.2/herbs (VITAMIN B COMPLEX 100  2-HERBS PO) vitamin B complex   Yes Provider, Historical   biotin 1 mg cap biotin   Yes Provider, Historical   aMILoride (MIDAMOR) 5 mg tablet amiloride 5 mg tablet   Yes Provider, Historical   fexofenadine (Allegra Allergy) 180 mg tablet Allegra Allergy   Yes Provider, Historical   cholecalciferol, vitamin d3, (Vitamin D3) 10 mcg (400 unit) cap Vitamin D3   Yes Provider, Historical   levothyroxine (Unithroid) 50 mcg tablet Unithroid 50 mcg tablet   1 po qd   Yes Provider, Historical   topiramate (TOPAMAX) 100 mg tablet TAKE 2 TABLETS EVERY DAY 7/30/20  Yes Provider, Historical   triamcinolone (Nasacort) 55 mcg nasal inhaler 2 Sprays by Both Nostrils route daily. 5/28/20  Yes Shanta Allen MD   QUEtiapine (SEROQUEL) 100 mg tablet TAKE 1 AND 1/2 TABLET BY MOUTH EVERY EVENING 11/4/19  Yes Casie Armendariz NP   DULoxetine (CYMBALTA) 60 mg capsule Take 1 Cap by mouth two (2) times a day. Takes 1 cap in the am and 1 cap at night. 11/1/19  Yes Charity Armendariz NP   lithium carbonate 300 mg capsule Take 1 Cap by mouth two (2) times daily (with meals). 11/1/19  Yes Martínez Lorenzo NP   ergocalciferol (ERGOCALCIFEROL) 50,000 unit capsule Take 1 Cap by mouth every seven (7) days.  Indications: Vitamin D Deficiency 7/31/18  Yes Charity Armendariz NP   JUNEL FE 1/20, 28, 1 mg-20 mcg (21)/75 mg (7) per tablet  8/25/15  Yes Provider, Historical   influenza vaccine 2020-21, 6 mos+,,PF, (Fluzone Quad 3028-7069, PF,) syrg injection Fluzone Quad [id number] (PF) 60 mcg (15 mcg x 4)/0.5 mL IM syringe   TO BE ADMINISTERED BY PHARMACIST FOR IMMUNIZATION    Provider, Historical   norethindrone-e estradiol-iron (Blisovi 24 Fe) 1 mg-20 mcg (24)/75 mg (4) tab Blisovi 24 Fe 1 mg-20 mcg (24)/75 mg (4) tablet    Provider, Historical   promethazine (PHENERGAN) 25 mg tablet Take 1 Tab by mouth every eight (8) hours as needed for Nausea. 12/31/20   Ami Quintanilla MD   influenza vaccine 2019-20, 4 yrs+,,PF, (Flucelvax Quad 0573-2852, PF,) syrg injection Flucelvax Quad 3956-6233 (PF) 60 mcg (15 mcg x 4)/0.5 mL IM syringe    Provider, Historical   topiramate (TOPAMAX) 100 mg tablet topiramate 100 mg tablet    Provider, Historical   naproxen (NAPROSYN) 375 mg tablet Take 1 Tab by mouth two (2) times daily (with meals). 5/26/20   Zuleima Ventura MD   mupirocin (BACTROBAN) 2 % ointment Apply  to affected area daily. 4/28/20   Bailey Nunez NP     Patient Active Problem List   Diagnosis Code    Low serum vitamin D R79.89    Iron deficiency anemia D50.9    Bipolar 1 disorder ([facility] [address] F31.9    Obesity (BMI 30-39. 9) E66.9    Fibromyalgia M79.7     Current Outpatient Medications   Medication Sig Dispense Refill    ibuprofen (MOTRIN) 600 mg tablet Take 1 Tab by mouth two (2) times daily (with meals). 20 Tab 0    ondansetron (ZOFRAN ODT) 4 mg disintegrating tablet ondansetron 4 mg disintegrating tablet   DISSOLVE 1 TAB TWICE A DAY      levothyroxine (Unithroid) 50 mcg tablet Unithroid 50 mcg tablet   TAKE 1 TABLET BY MOUTH EVERY DAY      albuterol (PROVENTIL HFA, VENTOLIN HFA, PROAIR HFA) 90 mcg/actuation inhaler Take 1 Puff by inhalation every six (6) hours as needed for Shortness of Breath. 1 Inhaler 0    SUMAtriptan (IMITREX) 50 mg tablet TAKE 1 TABLET BY MOUTH AT ONSET OF MIGRAINE (MAY REPEAT 1 IN 2HRS IF HEADACHE REMAINS) 9 Tab 1    butalbital-acetaminophen-caffeine (FIORICET, ESGIC) -40 mg per tablet Take 1 Tab by mouth every eight (8) hours as needed for Headache or Migraine.  20 Tab 0  vit B complex 100 no.2/herbs (VITAMIN B COMPLEX 100  2-HERBS PO) vitamin B complex      biotin 1 mg cap biotin      aMILoride (MIDAMOR) 5 mg tablet amiloride 5 mg tablet      fexofenadine (Allegra Allergy) 180 mg tablet Allegra Allergy      cholecalciferol, vitamin d3, (Vitamin D3) 10 mcg (400 unit) cap Vitamin D3      levothyroxine (Unithroid) 50 mcg tablet Unithroid 50 mcg tablet   1 po qd      topiramate (TOPAMAX) 100 mg tablet TAKE 2 TABLETS EVERY DAY      triamcinolone (Nasacort) 55 mcg nasal inhaler 2 Sprays by Both Nostrils route daily.  QUEtiapine (SEROQUEL) 100 mg tablet TAKE 1 AND 1/2 TABLET BY MOUTH EVERY EVENING 45 Tab 2    DULoxetine (CYMBALTA) 60 mg capsule Take 1 Cap by mouth two (2) times a day. Takes 1 cap in the am and 1 cap at night. 60 Cap 2    lithium carbonate 300 mg capsule Take 1 Cap by mouth two (2) times daily (with meals). 60 Cap 2    ergocalciferol (ERGOCALCIFEROL) 50,000 unit capsule Take 1 Cap by mouth every seven (7) days. Indications: Vitamin D Deficiency 12 Cap 3    JUNEL FE 1/20, 28, 1 mg-20 mcg (21)/75 mg (7) per tablet       influenza vaccine 2020-21, 6 mos+,,PF, (Fluzone Quad 6534-8932, PF,) syrg injection Fluzone Quad 5485-0733 (PF) 60 mcg (15 mcg x 4)/0.5 mL IM syringe   TO BE ADMINISTERED BY PHARMACIST FOR IMMUNIZATION      norethindrone-e estradiol-iron (Blisovi 24 Fe) 1 mg-20 mcg (24)/75 mg (4) tab Blisovi 24 Fe 1 mg-20 mcg (24)/75 mg (4) tablet      promethazine (PHENERGAN) 25 mg tablet Take 1 Tab by mouth every eight (8) hours as needed for Nausea. 15 Tab 0    influenza vaccine 2019-20, 4 yrs+,,PF, (Flucelvax Quad 9179-6472, PF,) syrg injection Flucelvax Quad 5196-8320 (PF) 60 mcg (15 mcg x 4)/0.5 mL IM syringe      topiramate (TOPAMAX) 100 mg tablet topiramate 100 mg tablet      naproxen (NAPROSYN) 375 mg tablet Take 1 Tab by mouth two (2) times daily (with meals). 30 Tab 0    mupirocin (BACTROBAN) 2 % ointment Apply  to affected area daily.  27 g 1     Allergies   Allergen Reactions   • Coconut Rash   • Pcn [Penicillins] Hives   • Soy Hives     Past Medical History:   Diagnosis Date   • Anemia    • Anxiety    • Arm fracture    • Autism 12/27/2020   • Bipolar 1 disorder (HCC)    • Depression    • Fibromyalgia    • IBS (irritable bowel syndrome)    • Migraine        ROS    Objective:     Patient-Reported Vitals 2/10/2021   Patient-Reported Pulse 110   Patient-Reported Temperature 97.1   Patient-Reported SpO2 99   Patient-Reported LMP -      General: alert, cooperative, no distress   Mental  status: normal mood, behavior, speech, dress, motor activity, and thought processes, able to follow commands   HENT: NCAT   Neck: no visualized mass   Resp: no respiratory distress   Neuro: no gross deficits   Skin: no discoloration or lesions of concern on visible areas   Psychiatric: normal affect, consistent with stated mood, no evidence of hallucinations     Additional exam findings:       We discussed the expected course, resolution and complications of the diagnosis(es) in detail.  Medication risks, benefits, costs, interactions, and alternatives were discussed as indicated.  I advised her to contact the office if her condition worsens, changes or fails to improve as anticipated. She expressed understanding with the diagnosis(es) and plan.       Daiana Eldridge, who was evaluated through a patient-initiated, synchronous (real-time) audio-video encounter, and/or her healthcare decision maker, is aware that it is a billable service, with coverage as determined by her insurance carrier. She provided verbal consent to proceed: Yes, and patient identification was verified. It was conducted pursuant to the emergency declaration under the [place] Act and the National Emergencies Act, 1135 waiver authority and the Coronavirus Preparedness and Response Supplemental Appropriations Act. A caregiver was present when appropriate. Ability to conduct physical exam was limited. I  was in the office. The patient was at home.       Sangeeta Powers MD

## 2021-02-26 ENCOUNTER — OFFICE VISIT (OUTPATIENT)
Dept: URGENT CARE | Age: 26
End: 2021-02-26
Payer: MEDICAID

## 2021-02-26 VITALS — TEMPERATURE: 99 F | HEART RATE: 96 BPM | RESPIRATION RATE: 14 BRPM | OXYGEN SATURATION: 98 %

## 2021-02-26 DIAGNOSIS — M79.10 MYALGIA: Primary | ICD-10-CM

## 2021-02-26 PROCEDURE — 99213 OFFICE O/P EST LOW 20 MIN: CPT | Performed by: NURSE PRACTITIONER

## 2021-02-26 NOTE — PROGRESS NOTES
This patient was seen at [address] [place] [address] Urgent Care while in their vehicle due to COVID-19 pandemic with PPE and focused examination in order to decrease community viral transmission. The patient/guardian gave verbal consent to treat. Josette Mtz is a 27 y.o. female who presents for COVID-19 testing. Patient reports recent exposure to COVID by friend about 5-6 days ago. Started with migraine 4 days ago and was seen in ED, had negative rapid COVID test. Now having myalgia, nausea, headache and fatigue. Denies any symptoms such as cough, SOB, chest tightness, congestion, ST, v/d, fever etc. No other complaints or concerns at this time. PMH: Fibro, Migraines, Bipolar. Non-smoker. The history is provided by the patient. Past Medical History:   Diagnosis Date    Anemia     Anxiety     Arm fracture     Autism 12/27/2020    Bipolar 1 disorder (HCC)     Depression     Fibromyalgia     IBS (irritable bowel syndrome)     Migraine         History reviewed. No pertinent surgical history.       Family History   Problem Relation Age of Onset    Depression Mother     Depression Father     Heart Disease Maternal Grandmother     Depression Maternal Grandmother     Cancer Maternal Grandfather         brain cancer    Diabetes Maternal Grandfather     Hypertension Maternal Grandfather     Stroke Maternal Grandfather     Cancer Paternal Grandfather         Social History     Socioeconomic History    Marital status: SINGLE     Spouse name: Not on file    Number of children: Not on file    Years of education: Not on file    Highest education level: Not on file   Occupational History    Not on file   Social Needs    Financial resource strain: Not on file    Food insecurity     Worry: Not on file     Inability: Not on file    Transportation needs     Medical: Not on file     Non-medical: Not on file   Tobacco Use    Smoking status: Never Smoker    Smokeless tobacco: Never Used Substance and Sexual Activity    Alcohol use: Never     Frequency: Never    Drug use: No    Sexual activity: Yes     Partners: Female     Birth control/protection: Pill   Lifestyle    Physical activity     Days per week: Not on file     Minutes per session: Not on file    Stress: Not on file   Relationships    Social connections     Talks on phone: Not on file     Gets together: Not on file     Attends Oriental orthodox service: Not on file     Active member of club or organization: Not on file     Attends meetings of clubs or organizations: Not on file     Relationship status: Not on file    Intimate partner violence     Fear of current or ex partner: Not on file     Emotionally abused: Not on file     Physically abused: Not on file     Forced sexual activity: Not on file   Other Topics Concern    Not on file   Social History Narrative    Not on file                ALLERGIES: Coconut, Pcn [penicillins], and Soy    Review of Systems   Constitutional: Positive for fatigue. Negative for activity change, appetite change, chills, diaphoresis and fever. HENT: Positive for rhinorrhea. Negative for congestion, ear pain, sinus pressure, sinus pain and sore throat. Respiratory: Negative for cough, chest tightness, shortness of breath and wheezing. Cardiovascular: Negative for chest pain. Gastrointestinal: Positive for nausea. Negative for abdominal pain, constipation, diarrhea and vomiting. Musculoskeletal: Positive for myalgias. Skin: Negative for rash. Neurological: Positive for headaches. Negative for dizziness, weakness and light-headedness. There were no vitals filed for this visit. Physical Exam  Vitals signs and nursing note reviewed. Constitutional:       General: She is not in acute distress. Appearance: Normal appearance. She is not ill-appearing. HENT:      Head: Normocephalic and atraumatic.       Mouth/Throat:      Mouth: Mucous membranes are moist.   Eyes: Conjunctiva/sclera: Conjunctivae normal.      Pupils: Pupils are equal, round, and reactive to light. Neck:      Musculoskeletal: Normal range of motion and neck supple. No muscular tenderness. Cardiovascular:      Rate and Rhythm: Normal rate. Pulmonary:      Effort: Pulmonary effort is normal.   Musculoskeletal: Normal range of motion. Lymphadenopathy:      Cervical: No cervical adenopathy. Skin:     General: Skin is warm and dry. Findings: No rash. Neurological:      Mental Status: She is alert and oriented to person, place, and time. Psychiatric:         Mood and Affect: Mood normal.         Behavior: Behavior normal.         MDM    Procedures        ICD-10-CM ICD-9-CM   1. Myalgia  M79.10 729.1       Orders Placed This Encounter    NOVEL CORONAVIRUS (COVID-19) ([facility] Default)     Scheduling Instructions:      1) Due to current limited availability of the COVID-19 PCR test, tests will be prioritized and may not be completed.              2) Order only if the test result will change clinical management or necessary for a return to mission-critical employment decision.              3) Print and instruct patient to adhere to CDC home isolation program. (Link Above)              4) Set up or refer patient for a monitoring program.              5) Have patient sign up for and leverage [employer] (if not previously done). Order Specific Question:   Is this test for diagnosis or screening? Answer:   Diagnosis of ill patient     Order Specific Question:   Symptomatic for COVID-19 as defined by CDC? Answer:   Yes     Order Specific Question:   Date of Symptom Onset     Answer:   2/21/2021     Order Specific Question:   Hospitalized for COVID-19? Answer:   No     Order Specific Question:   Admitted to ICU for COVID-19? Answer:   No     Order Specific Question:   Employed in healthcare setting?      Answer:   Unknown     Order Specific Question:   Resident in a congregate (group) care setting? Answer:   Unknown     Order Specific Question:   Pregnant? Answer:   Unknown     Order Specific Question:   Previously tested for COVID-19? Answer:   Yes      Quarantine recommendations reviewed per CDC guidelines. Encouraged deep breathing exercises, ambulation, Tylenol prn- should symptoms develop. Increase fluids with electrolytes    The patient is to follow up with PCP PRN. If signs and symptoms become worse the pt is to go to the ER.      Signed By: Walter Polanco NP     February 26, 2021

## 2021-02-27 LAB — SARS-COV-2, NAA: NOT DETECTED

## 2021-03-22 ENCOUNTER — OFFICE VISIT (OUTPATIENT)
Dept: NEUROLOGY | Age: 26
End: 2021-03-22
Payer: MEDICAID

## 2021-03-22 ENCOUNTER — TELEPHONE (OUTPATIENT)
Dept: NEUROLOGY | Age: 26
End: 2021-03-22

## 2021-03-22 VITALS
HEART RATE: 102 BPM | SYSTOLIC BLOOD PRESSURE: 134 MMHG | OXYGEN SATURATION: 99 % | HEIGHT: 62 IN | BODY MASS INDEX: 32.02 KG/M2 | DIASTOLIC BLOOD PRESSURE: 84 MMHG | WEIGHT: 174 LBS

## 2021-03-22 DIAGNOSIS — G43.009 MIGRAINE WITHOUT AURA AND WITHOUT STATUS MIGRAINOSUS, NOT INTRACTABLE: Primary | ICD-10-CM

## 2021-03-22 PROCEDURE — 99204 OFFICE O/P NEW MOD 45 MIN: CPT | Performed by: PSYCHIATRY & NEUROLOGY

## 2021-03-22 NOTE — TELEPHONE ENCOUNTER
Re: Anthony Camacho approved    Approval rec'd from [facility] via  [employer]    Effective 03/22/21-06/20/21  PA # [id number]    Pharmacy notified of approval via fax and patient called with update.

## 2021-03-22 NOTE — PROGRESS NOTES
Chief Complaint   Patient presents with    Migraine     \"I started having them in December, I have had headaches before, but nothing like this, it last 6 days, and I have had another one since then. \"       Referred by: Dr. 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 She has had headaches for several years now, her mother has migraines. Since December 2020 she has had increasing severe headaches such that she is gone to the ER twice for medication management. Headaches are diffuse throbbing sometimes bifrontal with nausea light sensitivity worse with movement. She notices it possibly around her cycle but she is on birth control continuously. She is having some spotting. She has maybe 1 or 2 severe breakthrough per month lasting up to 2 days each. In December 2020 she had a 6-day event. She sees psychiatry long-term for bipolar disorder on multiple medications that have not changed. She is on topiramate now for several years. No unusual numbness weakness or vision changes. She takes Imitrex acutely but was told not to drive while using it because it makes her tired. Migraine medication history: Topiramate, quetiapine, duloxetine, gabapentin, buspirone      Review of Systems   Eyes: Positive for photophobia. Negative for double vision. Gastrointestinal: Positive for nausea. Neurological: Positive for headaches. All other systems reviewed and are negative.       Past Medical History:   Diagnosis Date    Anemia     Anxiety     Arm fracture     Autism 12/27/2020    Bipolar 1 disorder (HCC)     Depression     Fibromyalgia     IBS (irritable bowel syndrome)     Migraine      Family History   Problem Relation Age of Onset    Depression Mother     Depression Father     Heart Disease Maternal Grandmother     Depression Maternal Grandmother     Cancer Maternal Grandfather         brain cancer    Diabetes Maternal Grandfather     Hypertension Maternal Grandfather     Stroke Maternal Grandfather     Cancer Paternal Grandfather      Social History     Socioeconomic History    Marital status: SINGLE     Spouse name: Not on file    Number of children: Not on file    Years of education: Not on file    Highest education level: Not on file   Occupational History    Not on file   Social Needs    Financial resource strain: Not on file    Food insecurity     Worry: Not on file     Inability: Not on file    Transportation needs     Medical: Not on file     Non-medical: Not on file   Tobacco Use    Smoking status: Never Smoker    Smokeless tobacco: Never Used   Substance and Sexual Activity    Alcohol use: Never     Frequency: Never    Drug use: No    Sexual activity: Yes     Partners: Female     Birth control/protection: Pill   Lifestyle    Physical activity     Days per week: Not on file     Minutes per session: Not on file    Stress: Not on file   Relationships    Social connections     Talks on phone: Not on file     Gets together: Not on file     Attends [employer] service: Not on file     Active member of club or organization: Not on file     Attends meetings of clubs or organizations: Not on file     Relationship status: Not on file    Intimate partner violence     Fear of current or ex partner: Not on file     Emotionally abused: Not on file     Physically abused: Not on file     Forced sexual activity: Not on file   Other Topics Concern    Not on file   Social History Narrative    Not on file     Current Outpatient Medications   Medication Sig    ubrogepant Rosezella Cyndi) 100 mg tablet Take 1 Tab by mouth daily as needed for Migraine.  ibuprofen (MOTRIN) 600 mg tablet Take 1 Tab by mouth two (2) times daily (with meals).     norethindrone-e estradiol-iron (Blisovi 24 Fe) 1 mg-20 mcg (24)/75 mg (4) tab Blisovi 24 Fe 1 mg-20 mcg (24)/75 mg (4) tablet    levothyroxine (Unithroid) 50 mcg tablet Unithroid 50 mcg tablet   TAKE 1 TABLET BY MOUTH EVERY DAY    albuterol (PROVENTIL HFA, VENTOLIN HFA, PROAIR HFA) 90 mcg/actuation inhaler Take 1 Puff by inhalation every six (6) hours as needed for Shortness of Breath.  SUMAtriptan (IMITREX) 50 mg tablet TAKE 1 TABLET BY MOUTH AT ONSET OF MIGRAINE (MAY REPEAT 1 IN 2HRS IF HEADACHE REMAINS)    vit B complex 100 no.2/herbs (VITAMIN B COMPLEX 100  2-HERBS PO) vitamin B complex    biotin 1 mg cap biotin    aMILoride (MIDAMOR) 5 mg tablet amiloride 5 mg tablet    fexofenadine (Allegra Allergy) 180 mg tablet Allegra Allergy    cholecalciferol, vitamin d3, (Vitamin D3) 10 mcg (400 unit) cap Vitamin D3    levothyroxine (Unithroid) 50 mcg tablet Unithroid 50 mcg tablet   1 po qd    topiramate (TOPAMAX) 100 mg tablet TAKE 2 TABLETS EVERY DAY    triamcinolone (Nasacort) 55 mcg nasal inhaler 2 Sprays by Both Nostrils route daily.  QUEtiapine (SEROQUEL) 100 mg tablet TAKE 1 AND 1/2 TABLET BY MOUTH EVERY EVENING    DULoxetine (CYMBALTA) 60 mg capsule Take 1 Cap by mouth two (2) times a day. Takes 1 cap in the am and 1 cap at night.  lithium carbonate 300 mg capsule Take 1 Cap by mouth two (2) times daily (with meals).  ondansetron (ZOFRAN ODT) 4 mg disintegrating tablet ondansetron 4 mg disintegrating tablet   DISSOLVE 1 TAB TWICE A DAY    influenza vaccine 2020-21, 6 mos+,,PF, (Fluzone Quad [id number], PF,) syrg injection Fluzone Quad [id number] (PF) 60 mcg (15 mcg x 4)/0.5 mL IM syringe   TO BE ADMINISTERED BY PHARMACIST FOR IMMUNIZATION    influenza vaccine 2019-20, 4 yrs+,,PF, (Flucelvax Quad 0041-4615, PF,) syrg injection Flucelvax Quad [id number] (PF) 60 mcg (15 mcg x 4)/0.5 mL IM syringe    topiramate (TOPAMAX) 100 mg tablet topiramate 100 mg tablet    mupirocin (BACTROBAN) 2 % ointment Apply  to affected area daily.  ergocalciferol (ERGOCALCIFEROL) 50,000 unit capsule Take 1 Cap by mouth every seven (7) days.  Indications: Vitamin D Deficiency     No current facility-administered medications for this visit. Allergies   Allergen Reactions    Coconut Rash    Pcn [Penicillins] Hives    Soy Hives         Neurologic Exam     Mental Status   Oriented to person, place, and time. Cranial Nerves   Cranial nerves II through XII intact. Motor Exam   Muscle bulk: normal    Strength   Strength 5/5 throughout. Sensory Exam   Light touch normal.     Gait, Coordination, and Reflexes     Gait  Gait: normal    Coordination   Finger to nose coordination: normal    Physical Exam   Constitutional: She is oriented to person, place, and time. She appears well-developed and well-nourished. Cardiovascular: Normal rate. Neurological: She is oriented to person, place, and time. She has normal strength. She has a normal Finger-Nose-Finger Test. Gait normal.   Psychiatric: Her behavior is normal.   Vitals reviewed. Visit Vitals  /84 (BP 1 Location: Right upper arm, BP Patient Position: Sitting)   Pulse (!) 102   Ht 5' 2\" (1.575 m)   Wt 174 lb (78.9 kg)   SpO2 99%   BMI 31.83 kg/m²       Lab Results   Component Value Date/Time    WBC 10.0 02/05/2021 10:11 PM    HGB 13.5 02/05/2021 10:11 PM    HCT 43.5 02/05/2021 10:11 PM    PLATELET 108 33/06/3826 10:11 PM    MCV 89.5 02/05/2021 10:11 PM     Lab Results   Component Value Date/Time    ALT (SGPT) 15 06/09/2017 10:15 AM    Alk. phosphatase 81 06/09/2017 10:15 AM    Bilirubin, total 0.2 06/09/2017 10:15 AM    Albumin 4.4 06/09/2017 10:15 AM    Protein, total 7.1 06/09/2017 10:15 AM    PLATELET 953 20/49/8972 10:11 PM            Assessment and Plan   Diagnoses and all orders for this visit:    1. Migraine without aura and without status migrainosus, not intractable    Other orders  -     ubrogepant Bambi Davis) 100 mg tablet; Take 1 Tab by mouth daily as needed for Migraine. 25-year-old woman who is having migraine headaches once or twice a month lasting up to 2 days at a time.   No focal abnormal findings on exam and no red flags on the history so defer imaging at this time. I am going to have her try Ubrelvy acutely instead of sumatriptan hand. Hopefully she will have less side effects. If she begins to have events weekly will need to escalate and see some type of prescription preventative treatment possibly a CGRP inhibitor in this case. We talked about topiramate and her birth control as sometimes topiramate can cause spotting. She has been on these medications for years and I suspect she is having hormonal changes physiologically. She will need to discuss with her gynecologist other options for birth control. Keep a headache log and we will see her in about 6 months. I reviewed and decided to continue the current medications. This clinical note was dictated with an electronic dictation software that can make unintentional errors. If there are any questions, please contact me directly for clarification. A notice of this visit/encounter being completed has been sent electronically to the patient's PCP and/or referring provider.      [address] [place] [address], [facility] [address]  Diplomate SHERINE

## 2021-03-22 NOTE — PROGRESS NOTES
Chief Complaint   Patient presents with    Migraine     \"I started having them in December, I have had headaches before, but nothing like this, it last 6 days, and I have had another one since then. \"     Visit Vitals  /84 (BP 1 Location: Right upper arm, BP Patient Position: Sitting)   Pulse (!) 102   Ht 5' 2\" (1.575 m)   Wt 174 lb (78.9 kg)   SpO2 99%   BMI 31.83 kg/m²

## 2021-04-08 ENCOUNTER — OFFICE VISIT (OUTPATIENT)
Dept: PRIMARY CARE CLINIC | Age: 26
End: 2021-04-08
Payer: MEDICAID

## 2021-04-08 VITALS
OXYGEN SATURATION: 99 % | SYSTOLIC BLOOD PRESSURE: 136 MMHG | BODY MASS INDEX: 31.65 KG/M2 | HEART RATE: 94 BPM | TEMPERATURE: 99 F | RESPIRATION RATE: 16 BRPM | DIASTOLIC BLOOD PRESSURE: 93 MMHG | HEIGHT: 62 IN | WEIGHT: 172 LBS

## 2021-04-08 DIAGNOSIS — R03.0 ELEVATED BLOOD PRESSURE READING: Primary | ICD-10-CM

## 2021-04-08 PROCEDURE — 99213 OFFICE O/P EST LOW 20 MIN: CPT | Performed by: FAMILY MEDICINE

## 2021-04-08 NOTE — PATIENT INSTRUCTIONS
Elevated Blood Pressure: Care Instructions  Your Care Instructions    Blood pressure is a measure of how hard the blood pushes against the walls of your arteries. It's normal for blood pressure to go up and down throughout the day. But if it stays up over time, you have high blood pressure. Two numbers tell you your blood pressure. The first number is the systolic pressure. It shows how hard the blood pushes when your heart is pumping. The second number is the diastolic pressure. It shows how hard the blood pushes between heartbeats, when your heart is relaxed and filling with blood. An ideal blood pressure in adults is less than 120/80 (say \"120 over 80\"). High blood pressure is 140/90 or higher. You have high blood pressure if your top number is 140 or higher or your bottom number is 90 or higher, or both. The main test for high blood pressure is simple, fast, and painless. To diagnose high blood pressure, your doctor will test your blood pressure at different times. After testing your blood pressure, your doctor may ask you to test it again when you are home. If you are diagnosed with high blood pressure, you can work with your doctor to make a long-term plan to manage it. Follow-up care is a key part of your treatment and safety. Be sure to make and go to all appointments, and call your doctor if you are having problems. It's also a good idea to know your test results and keep a list of the medicines you take. How can you care for yourself at home? · Do not smoke. Smoking increases your risk for heart attack and stroke. If you need help quitting, talk to your doctor about stop-smoking programs and medicines. These can increase your chances of quitting for good. · Stay at a healthy weight. · Try to limit how much sodium you eat to less than 2,300 milligrams (mg) a day. Your doctor may ask you to try to eat less than 1,500 mg a day. · Be physically active.  Get at least 30 minutes of exercise on most days of the week. Walking is a good choice. You also may want to do other activities, such as running, swimming, cycling, or playing tennis or team sports. · Avoid or limit alcohol. Talk to your doctor about whether you can drink any alcohol. · Eat plenty of fruits, vegetables, and low-fat dairy products. Eat less saturated and total fats. · Learn how to check your blood pressure at home. When should you call for help? Call your doctor now or seek immediate medical care if:  ? · Your blood pressure is much higher than normal (such as 180/110 or higher). ? · You think high blood pressure is causing symptoms such as:  ¨ Severe headache. ¨ Blurry vision. ? Watch closely for changes in your health, and be sure to contact your doctor if:  ? · You do not get better as expected. Where can you learn more? Go to http://www.gray.com/. Enter C760 in the search box to learn more about \"Elevated Blood Pressure: Care Instructions. \"  Current as of: September 21, 2016  Content Version: 11.4  © 9742-6517 [employer]. Care instructions adapted under license by [employer] (which disclaims liability or warranty for this information). If you have questions about a medical condition or this instruction, always ask your healthcare professional. [address] any warranty or liability for your use of this information.

## 2021-04-08 NOTE — PROGRESS NOTES
Identified pt with two pt identifiers(name and ). Chief Complaint   Patient presents with    Elevated Blood Pressure        Vitals:    21 0914 21 0917   BP: (!) 135/98 (!) 136/93   Pulse: 94    Resp: 16    Temp: 99 °F (37.2 °C)    TempSrc: Oral    SpO2: 99%    Weight: 172 lb (78 kg)    Height: 5' 2\" (1.575 m)    PainSc:   0 - No pain        Health Maintenance Due   Topic    Hepatitis C Screening     COVID-19 Vaccine (1)    PAP AKA CERVICAL CYTOLOGY        1. Have you been to the ER, urgent care clinic since your last visit? Hospitalized since your last visit? yes [facility] urgent care - infected finger R hand     2. Have you seen or consulted any other health care providers outside of the [address] [place] [address] since your last visit? Include any pap smears or colon screening.  Yes urgent care

## 2021-04-08 NOTE — PROGRESS NOTES
Subjective:     Chief Complaint   Patient presents with    Elevated Blood Pressure        She  is a 27y.o. year old female who presents today for follow on blood pressure. Went to ob-gyn for a regular check up yestarday where her blood pressure was 174/114, 150/111. Pertinent items are noted in HPI.   Objective:     Vitals:    04/08/21 0914 04/08/21 0917   BP: (!) 135/98 (!) 136/93   Pulse: 94    Resp: 16    Temp: 99 °F (37.2 °C)    TempSrc: Oral    SpO2: 99%    Weight: 172 lb (78 kg)    Height: 5' 2\" (1.575 m)        Physical Examination: General appearance - alert, well appearing, and in no distress, oriented to person, place, and time and overweight  Mental status - alert, oriented to person, place, and time, normal mood, behavior, speech, dress, motor activity, and thought processes  Chest - clear to auscultation, no wheezes, rales or rhonchi, symmetric air entry  Heart - normal rate, regular rhythm, normal S1, S2, no murmurs, rubs, clicks or gallops  Extremities - no pedal edema noted    Allergies   Allergen Reactions    Coconut Rash    Pcn [Penicillins] Hives    Soy Hives      Social History     Socioeconomic History    Marital status: SINGLE     Spouse name: Not on file    Number of children: Not on file    Years of education: Not on file    Highest education level: Not on file   Tobacco Use    Smoking status: Never Smoker    Smokeless tobacco: Never Used   Substance and Sexual Activity    Alcohol use: Never     Frequency: Never    Drug use: No    Sexual activity: Yes     Partners: Female     Birth control/protection: Pill      Family History   Problem Relation Age of Onset    Depression Mother     Depression Father     Heart Disease Maternal Grandmother     Depression Maternal Grandmother     Cancer Maternal Grandfather         brain cancer    Diabetes Maternal Grandfather     Hypertension Maternal Grandfather     Stroke Maternal Grandfather     Cancer Paternal Grandfather History reviewed. No pertinent surgical history. Past Medical History:   Diagnosis Date    Anemia     Anxiety     Arm fracture     Autism 12/27/2020    Bipolar 1 disorder (HCC)     Depression     Fibromyalgia     IBS (irritable bowel syndrome)     Migraine       Current Outpatient Medications   Medication Sig Dispense Refill    ubrogepant (Ubrelvy) 100 mg tablet Take 1 Tab by mouth daily as needed for Migraine. 10 Tab 5    ibuprofen (MOTRIN) 600 mg tablet Take 1 Tab by mouth two (2) times daily (with meals). 20 Tab 0    ondansetron (ZOFRAN ODT) 4 mg disintegrating tablet ondansetron 4 mg disintegrating tablet   DISSOLVE 1 TAB TWICE A DAY      norethindrone-e estradiol-iron (Blisovi 24 Fe) 1 mg-20 mcg (24)/75 mg (4) tab Blisovi 24 Fe 1 mg-20 mcg (24)/75 mg (4) tablet      albuterol (PROVENTIL HFA, VENTOLIN HFA, PROAIR HFA) 90 mcg/actuation inhaler Take 1 Puff by inhalation every six (6) hours as needed for Shortness of Breath. 1 Inhaler 0    vit B complex 100 no.2/herbs (VITAMIN B COMPLEX 100  2-HERBS PO) vitamin B complex      biotin 1 mg cap biotin      aMILoride (MIDAMOR) 5 mg tablet amiloride 5 mg tablet      fexofenadine (Allegra Allergy) 180 mg tablet Allegra Allergy      cholecalciferol, vitamin d3, (Vitamin D3) 10 mcg (400 unit) cap Vitamin D3      levothyroxine (Unithroid) 50 mcg tablet Unithroid 50 mcg tablet   1 po qd      topiramate (TOPAMAX) 100 mg tablet TAKE 2 TABLETS EVERY DAY      QUEtiapine (SEROQUEL) 100 mg tablet TAKE 1 AND 1/2 TABLET BY MOUTH EVERY EVENING 45 Tab 2    DULoxetine (CYMBALTA) 60 mg capsule Take 1 Cap by mouth two (2) times a day. Takes 1 cap in the am and 1 cap at night. 60 Cap 2    lithium carbonate 300 mg capsule Take 1 Cap by mouth two (2) times daily (with meals).  60 Cap 2    levothyroxine (Unithroid) 50 mcg tablet Unithroid 50 mcg tablet   TAKE 1 TABLET BY MOUTH EVERY DAY      SUMAtriptan (IMITREX) 50 mg tablet TAKE 1 TABLET BY MOUTH AT ONSET OF MIGRAINE (MAY REPEAT 1 IN 2HRS IF HEADACHE REMAINS) 9 Tab 1    topiramate (TOPAMAX) 100 mg tablet topiramate 100 mg tablet      mupirocin (BACTROBAN) 2 % ointment Apply  to affected area daily. 30 g 1        Assessment/ Plan:   Diagnoses and all orders for this visit:    1. Elevated blood pressure reading      BP is slightly elevated . Discussed about lifestyle change, low slat diet, encouraged to work on weight loss. Monitor BP daily. Follow up if BP stays >140/90s. Medication risks/benefits/costs/interactions/alternatives discussed with patient. Advised patient to call back or return to office if symptoms worsen/change/persist. If patient cannot reach us or should anything more severe/urgent arise he/she should proceed directly to the nearest emergency department. Discussed expected course/resolution/complications of diagnosis in detail with patient. Patient given a written after visit summary which includes her diagnoses, current medications and vitals. Patient expressed understanding with the diagnosis and plan. Follow-up and Dispositions    · Return in about 4 weeks (around 5/6/2021), or if symptoms worsen or fail to improve, for Bring BP log book. Zeenat Uriostegui

## 2021-04-27 ENCOUNTER — HOSPITAL ENCOUNTER (EMERGENCY)
Age: 26
Discharge: HOME | End: 2021-04-27
Attending: EMERGENCY MEDICINE
Payer: MEDICAID

## 2021-04-27 VITALS
RESPIRATION RATE: 16 BRPM | SYSTOLIC BLOOD PRESSURE: 146 MMHG | OXYGEN SATURATION: 100 % | HEIGHT: 62 IN | WEIGHT: 173.06 LBS | BODY MASS INDEX: 31.85 KG/M2 | DIASTOLIC BLOOD PRESSURE: 99 MMHG | TEMPERATURE: 98.9 F | HEART RATE: 97 BPM

## 2021-04-27 DIAGNOSIS — R51.9 ACUTE NONINTRACTABLE HEADACHE, UNSPECIFIED HEADACHE TYPE: ICD-10-CM

## 2021-04-27 DIAGNOSIS — I10 HYPERTENSION, UNSPECIFIED TYPE: Primary | ICD-10-CM

## 2021-04-27 PROCEDURE — 99285 EMERGENCY DEPT VISIT HI MDM: CPT

## 2021-04-27 PROCEDURE — 93005 ELECTROCARDIOGRAM TRACING: CPT

## 2021-04-27 PROCEDURE — 74011250637 HC RX REV CODE- 250/637: Performed by: EMERGENCY MEDICINE

## 2021-04-27 RX ORDER — AMLODIPINE BESYLATE 5 MG/1
5 TABLET ORAL
Status: COMPLETED | OUTPATIENT
Start: 2021-04-27 | End: 2021-04-27

## 2021-04-27 RX ORDER — CLONIDINE HYDROCHLORIDE 0.1 MG/1
0.2 TABLET ORAL
Status: COMPLETED | OUTPATIENT
Start: 2021-04-27 | End: 2021-04-27

## 2021-04-27 RX ORDER — ACETAMINOPHEN 500 MG
1000 TABLET ORAL
Status: COMPLETED | OUTPATIENT
Start: 2021-04-27 | End: 2021-04-27

## 2021-04-27 RX ORDER — AMLODIPINE BESYLATE 5 MG/1
10 TABLET ORAL DAILY
Status: DISCONTINUED | OUTPATIENT
Start: 2021-04-28 | End: 2021-04-27

## 2021-04-27 RX ORDER — AMLODIPINE BESYLATE 10 MG/1
10 TABLET ORAL DAILY
Qty: 30 TAB | Refills: 0 | Status: SHIPPED | OUTPATIENT
Start: 2021-04-27 | End: 2021-04-29 | Stop reason: SDUPTHER

## 2021-04-27 RX ADMIN — ACETAMINOPHEN 1000 MG: 500 TABLET ORAL at 20:42

## 2021-04-27 RX ADMIN — AMLODIPINE BESYLATE 5 MG: 5 TABLET ORAL at 20:42

## 2021-04-27 RX ADMIN — CLONIDINE HYDROCHLORIDE 0.2 MG: 0.1 TABLET ORAL at 19:45

## 2021-04-27 NOTE — ED TRIAGE NOTES
Pt arrives ambulatory to ed with complaints of HTN with HA since waking up this am.     Pt has been having problems with BP since december. PCP has been following pt via virtual appointments. Educated pt to get cuff and monitor bp every day. Tylenol around 1400 without relief.

## 2021-04-28 LAB
ATRIAL RATE: 94 BPM
CALCULATED P AXIS, ECG09: 60 DEGREES
CALCULATED R AXIS, ECG10: 24 DEGREES
CALCULATED T AXIS, ECG11: 40 DEGREES
DIAGNOSIS, 93000: NORMAL
P-R INTERVAL, ECG05: 160 MS
Q-T INTERVAL, ECG07: 358 MS
QRS DURATION, ECG06: 86 MS
QTC CALCULATION (BEZET), ECG08: 447 MS
VENTRICULAR RATE, ECG03: 94 BPM

## 2021-04-28 NOTE — ED PROVIDER NOTES
The history is provided by the patient. Hypertension   This is a chronic (being monitored by PCP but not currently on medications as she was trying to manage symptoms with diet and exercise) problem. The current episode started more than 1 week ago. The problem has not changed since onset. Associated symptoms include anxiety and headaches. Pertinent negatives include no chest pain, no orthopnea, no palpitations, no blurred vision, no dizziness, no shortness of breath, no nausea and no vomiting. There are no associated agents to hypertension. Risk factors include family history. Past Medical History:   Diagnosis Date    Anemia     Anxiety     Arm fracture     Autism 12/27/2020    Bipolar 1 disorder (HCC)     Depression     Fibromyalgia     Hypothyroidism     IBS (irritable bowel syndrome)     Migraine        History reviewed. No pertinent surgical history.       Family History:   Problem Relation Age of Onset    Depression Mother     Depression Father     Heart Disease Maternal Grandmother     Depression Maternal Grandmother     Cancer Maternal Grandfather         brain cancer    Diabetes Maternal Grandfather     Hypertension Maternal Grandfather     Stroke Maternal Grandfather     Cancer Paternal Grandfather        Social History     Socioeconomic History    Marital status: SINGLE     Spouse name: Not on file    Number of children: Not on file    Years of education: Not on file    Highest education level: Not on file   Occupational History    Not on file   Social Needs    Financial resource strain: Not on file    Food insecurity     Worry: Not on file     Inability: Not on file   [employer] needs     Medical: Not on file     Non-medical: Not on file   Tobacco Use    Smoking status: Never Smoker    Smokeless tobacco: Never Used   Substance and Sexual Activity    Alcohol use: Never     Frequency: Never    Drug use: No    Sexual activity: Yes     Partners: Female     Birth control/protection: Pill   Lifestyle    Physical activity     Days per week: Not on file     Minutes per session: Not on file    Stress: Not on file   Relationships    Social connections     Talks on phone: Not on file     Gets together: Not on file     Attends [employer] service: Not on file     Active member of club or organization: Not on file     Attends meetings of clubs or organizations: Not on file     Relationship status: Not on file    Intimate partner violence     Fear of current or ex partner: Not on file     Emotionally abused: Not on file     Physically abused: Not on file     Forced sexual activity: Not on file   Other Topics Concern    Not on file   Social History Narrative    Not on file         ALLERGIES: Coconut, Pcn [penicillins], and Soy    Review of Systems   Eyes: Negative for blurred vision. Respiratory: Negative for shortness of breath. Cardiovascular: Negative for chest pain, palpitations and orthopnea. Gastrointestinal: Negative for nausea and vomiting. Neurological: Positive for headaches. Negative for dizziness. Vitals:    04/27/21 2030 04/27/21 2058 04/27/21 2100 04/27/21 2101   BP: (!) 150/107 (!) 142/100 (!) 146/99    Pulse:       Resp:       Temp:       SpO2: 100% 100%  100%   Weight:       Height:                Physical Exam  Vitals signs and nursing note reviewed. Constitutional:       Appearance: She is well-developed. HENT:      Head: Normocephalic and atraumatic. Eyes:      Pupils: Pupils are equal, round, and reactive to light. Neck:      Musculoskeletal: Normal range of motion and neck supple. Cardiovascular:      Rate and Rhythm: Normal rate and regular rhythm. Pulmonary:      Effort: Pulmonary effort is normal.      Breath sounds: Normal breath sounds. Abdominal:      General: There is no distension. Palpations: Abdomen is soft. Tenderness: There is no abdominal tenderness. Skin:     General: Skin is warm and dry.       Capillary Refill: Capillary refill takes less than 2 seconds. Neurological:      General: No focal deficit present. Mental Status: She is alert and oriented to person, place, and time. Psychiatric:         Mood and Affect: Mood normal.         Behavior: Behavior normal.          MDM       Procedures    EKG performed at 7:33 PM  Normal sinus rhythm, normal axis, no ST changes, no T wave changes, no ectopy, 94 bpm  EKG interpreted by Kathy Hendricks MD     Education was provided to the patient today regarding their hypertension. Patient has not signs/symptoms of ACS, CHF, CVA, or other hypertensive emergency. Patient is made aware of their elevated blood pressure and is instructed to follow up this week with their Primary Care for a recheck. Patient is counseled regarding consequences of chronic, uncontrolled hypertension including kidney disease, heart disease, stroke or even death. Patient states their understanding and agrees to follow up this week. Additionally, during their visit, I discussed sodium restriction, maintaining ideal body weight and regular exercise program as physiologic means to achieve blood pressure control. The patient will strive towards this.

## 2021-04-29 ENCOUNTER — OFFICE VISIT (OUTPATIENT)
Dept: PRIMARY CARE CLINIC | Age: 26
End: 2021-04-29
Payer: MEDICAID

## 2021-04-29 VITALS
BODY MASS INDEX: 31.06 KG/M2 | TEMPERATURE: 97.1 F | SYSTOLIC BLOOD PRESSURE: 136 MMHG | DIASTOLIC BLOOD PRESSURE: 89 MMHG | WEIGHT: 168.8 LBS | HEIGHT: 62 IN | HEART RATE: 83 BPM | OXYGEN SATURATION: 99 %

## 2021-04-29 DIAGNOSIS — I10 ESSENTIAL HYPERTENSION: Primary | ICD-10-CM

## 2021-04-29 PROCEDURE — 99213 OFFICE O/P EST LOW 20 MIN: CPT | Performed by: FAMILY MEDICINE

## 2021-04-29 RX ORDER — AMLODIPINE BESYLATE 10 MG/1
10 TABLET ORAL DAILY
Qty: 30 TAB | Refills: 1 | Status: SHIPPED | OUTPATIENT
Start: 2021-04-29 | End: 2021-05-19

## 2021-04-29 NOTE — PROGRESS NOTES
Subjective:     Chief Complaint   Patient presents with    Hypertension     hospital follow up. She  is a 27y.o. year old female who presents today for follow up from her recent ER visit. Patient reports her blood pressure was very high and she was having headache so she went to [address] [place] [address] ER two days ago. She was diagnosed with HTN and sent home with amlodipine 10 mg. Patient reports that she has been tolerating the med fine. BP is getting better. HA has resolved. Denies any any CP, soa, cough. Patient started working on healthy diet . Lost 4 pounds in last 3 weeks. She reports that her Mom was diagnosed with HTN around her age. Pertinent items are noted in HPI.   Objective:     Vitals:    04/29/21 1217   BP: 136/89   Pulse: 83   Temp: 97.1 °F (36.2 °C)   TempSrc: Temporal   SpO2: 99%   Weight: 168 lb 12.8 oz (76.6 kg)   Height: 5' 2\" (1.575 m)       Physical Examination: General appearance - alert, well appearing, and in no distress, oriented to person, place, and time and overweight  Mental status - alert, oriented to person, place, and time, normal mood, behavior, speech, dress, motor activity, and thought processes  Chest - clear to auscultation, no wheezes, rales or rhonchi, symmetric air entry  Heart - normal rate, regular rhythm, normal S1, S2, no murmurs, rubs, clicks or gallops  Extremities - no pedal edema noted    Allergies   Allergen Reactions    Coconut Rash    Pcn [Penicillins] Hives    Soy Hives      Social History     Socioeconomic History    Marital status: SINGLE     Spouse name: Not on file    Number of children: Not on file    Years of education: Not on file    Highest education level: Not on file   Tobacco Use    Smoking status: Never Smoker    Smokeless tobacco: Never Used   Substance and Sexual Activity    Alcohol use: Never     Frequency: Never    Drug use: No    Sexual activity: Yes     Partners: Female     Birth control/protection: Pill      Family History Problem Relation Age of Onset    Depression Mother     Depression Father     Heart Disease Maternal Grandmother     Depression Maternal Grandmother     Cancer Maternal Grandfather         brain cancer    Diabetes Maternal Grandfather     Hypertension Maternal Grandfather     Stroke Maternal Grandfather     Cancer Paternal Grandfather       No past surgical history on file. Past Medical History:   Diagnosis Date    Anemia     Anxiety     Arm fracture     Autism 12/27/2020    Bipolar 1 disorder (HCC)     Depression     Fibromyalgia     Hypothyroidism     IBS (irritable bowel syndrome)     Migraine       Current Outpatient Medications   Medication Sig Dispense Refill    medroxyprogesterone acetate (DEPO-PROVERA IM) by IntraMUSCular route.  amLODIPine (NORVASC) 10 mg tablet Take 1 Tab by mouth daily for 30 days. 30 Tab 0    ubrogepant (Ubrelvy) 100 mg tablet Take 1 Tab by mouth daily as needed for Migraine. 10 Tab 5    vit B complex 100 no.2/herbs (VITAMIN B COMPLEX 100  2-HERBS PO) vitamin B complex      biotin 1 mg cap biotin      fexofenadine (Allegra Allergy) 180 mg tablet Allegra Allergy      cholecalciferol, vitamin d3, (Vitamin D3) 10 mcg (400 unit) cap Vitamin D3      levothyroxine (Unithroid) 50 mcg tablet Unithroid 50 mcg tablet   1 po qd      topiramate (TOPAMAX) 100 mg tablet TAKE 2 TABLETS EVERY DAY      QUEtiapine (SEROQUEL) 100 mg tablet TAKE 1 AND 1/2 TABLET BY MOUTH EVERY EVENING 45 Tab 2    DULoxetine (CYMBALTA) 60 mg capsule Take 1 Cap by mouth two (2) times a day. Takes 1 cap in the am and 1 cap at night. 60 Cap 2    lithium carbonate 300 mg capsule Take 1 Cap by mouth two (2) times daily (with meals). 60 Cap 2    ibuprofen (MOTRIN) 600 mg tablet Take 1 Tab by mouth two (2) times daily (with meals).  20 Tab 0    ondansetron (ZOFRAN ODT) 4 mg disintegrating tablet ondansetron 4 mg disintegrating tablet   DISSOLVE 1 TAB TWICE A DAY      albuterol (PROVENTIL HFA, VENTOLIN HFA, PROAIR HFA) 90 mcg/actuation inhaler Take 1 Puff by inhalation every six (6) hours as needed for Shortness of Breath. 1 Inhaler 0        Assessment/ Plan:   Diagnoses and all orders for this visit:    1. Essential hypertension  -    Patient started taking amlodipine two days ago. BP is stable . Continue  amLODIPine (NORVASC) 10 mg tablet; Take 1 Tab by mouth daily. Continue work on diet and exercise. Will consider renal USG. ER records reviewed. 2. BMI 30.0-30.9,adult      Counseled on diet and exercise. Medication risks/benefits/costs/interactions/alternatives discussed with patient. Advised patient to call back or return to office if symptoms worsen/change/persist. If patient cannot reach us or should anything more severe/urgent arise he/she should proceed directly to the nearest emergency department. Discussed expected course/resolution/complications of diagnosis in detail with patient. Patient given a written after visit summary which includes her diagnoses, current medications and vitals. Patient expressed understanding with the diagnosis and plan. Follow-up and Dispositions    · Return in about 6 weeks (around 6/10/2021), or if symptoms worsen or fail to improve, for Bring BP log book. Delsa Severance

## 2021-04-29 NOTE — PROGRESS NOTES
Room 8    Identified pt with two pt identifiers(name and ). Reviewed record in preparation for visit and have obtained necessary documentation. All patient medications has been reviewed. No chief complaint on file. 3 most recent PHQ Screens 2020   Little interest or pleasure in doing things Not at all   Feeling down, depressed, irritable, or hopeless Not at all   Total Score PHQ 2 0     Abuse Screening Questionnaire 2020   Do you ever feel afraid of your partner? N   Are you in a relationship with someone who physically or mentally threatens you? N   Is it safe for you to go home? Y       Health Maintenance Due   Topic    Hepatitis C Screening     COVID-19 Vaccine (1)    PAP AKA CERVICAL CYTOLOGY      Health Maintenance Review: Patient reminded of \"due or due soon\" health maintenance. I have asked the patient to contact his/her primary care provider (PCP) for follow-up on his/her health maintenance. Vitals:    21 1217   BP: 136/89   Pulse: 83   Temp: 97.1 °F (36.2 °C)   TempSrc: Temporal   SpO2: 99%   Weight: 168 lb 12.8 oz (76.6 kg)   Height: 5' 2\" (1.575 m)   PainSc:   2   PainLoc: Head       Wt Readings from Last 3 Encounters:   21 168 lb 12.8 oz (76.6 kg)   21 173 lb 1 oz (78.5 kg)   21 172 lb (78 kg)     Temp Readings from Last 3 Encounters:   21 97.1 °F (36.2 °C) (Temporal)   21 98.9 °F (37.2 °C)   21 99 °F (37.2 °C) (Oral)     BP Readings from Last 3 Encounters:   21 136/89   21 (!) 146/99   21 (!) 136/93     Pulse Readings from Last 3 Encounters:   21 83   21 97   21 94       Coordination of Care Questionnaire:   1) Have you been to an emergency room, urgent care, or hospitalized since your last visit? Yes    2.  Have seen or consulted any other health care provider since your last visit?   no

## 2021-05-19 ENCOUNTER — OFFICE VISIT (OUTPATIENT)
Dept: PRIMARY CARE CLINIC | Age: 26
End: 2021-05-19
Payer: MEDICAID

## 2021-05-19 VITALS
DIASTOLIC BLOOD PRESSURE: 83 MMHG | TEMPERATURE: 97.8 F | WEIGHT: 171 LBS | BODY MASS INDEX: 31.47 KG/M2 | OXYGEN SATURATION: 100 % | RESPIRATION RATE: 16 BRPM | HEART RATE: 109 BPM | SYSTOLIC BLOOD PRESSURE: 131 MMHG | HEIGHT: 62 IN

## 2021-05-19 DIAGNOSIS — I10 ESSENTIAL HYPERTENSION: ICD-10-CM

## 2021-05-19 DIAGNOSIS — L24.89 IRRITANT CONTACT DERMATITIS DUE TO OTHER AGENTS: ICD-10-CM

## 2021-05-19 DIAGNOSIS — M25.473 ANKLE SWELLING, UNSPECIFIED LATERALITY: Primary | ICD-10-CM

## 2021-05-19 PROCEDURE — 99214 OFFICE O/P EST MOD 30 MIN: CPT | Performed by: FAMILY MEDICINE

## 2021-05-19 RX ORDER — AMLODIPINE BESYLATE 5 MG/1
5 TABLET ORAL DAILY
Qty: 90 TABLET | Refills: 0 | Status: SHIPPED | OUTPATIENT
Start: 2021-05-19 | End: 2021-07-01 | Stop reason: SDUPTHER

## 2021-05-19 RX ORDER — SPIRONOLACTONE 25 MG/1
25 TABLET ORAL DAILY
Qty: 30 TABLET | Refills: 1 | Status: SHIPPED | OUTPATIENT
Start: 2021-05-19 | End: 2021-07-01 | Stop reason: SDUPTHER

## 2021-05-19 RX ORDER — TRIAMCINOLONE ACETONIDE 0.25 MG/G
CREAM TOPICAL 2 TIMES DAILY
Qty: 15 G | Refills: 0 | Status: SHIPPED | OUTPATIENT
Start: 2021-05-19 | End: 2021-09-24

## 2021-05-19 RX ORDER — ACETAMINOPHEN 325 MG/1
TABLET ORAL
COMMUNITY

## 2021-05-19 NOTE — PROGRESS NOTES
Identified pt with two pt identifiers(name and ). Chief Complaint   Patient presents with    Ankle swelling     bilateral - yesterday was really bad with Rash        3 most recent PHQ Screens 2020   Little interest or pleasure in doing things Not at all   Feeling down, depressed, irritable, or hopeless Not at all   Total Score PHQ 2 0        Vitals:    21 1526   BP: 131/83   Pulse: (!) 109   Resp: 16   Temp: 97.8 °F (36.6 °C)   TempSrc: Temporal   SpO2: 100%   Weight: 171 lb (77.6 kg)   Height: 5' 2\" (1.575 m)   PainSc:   0 - No pain       Health Maintenance Due   Topic    Hepatitis C Screening     PAP AKA CERVICAL CYTOLOGY        1. Have you been to the ER, urgent care clinic since your last visit? Hospitalized since your last visit? No    2. Have you seen or consulted any other health care providers outside of the [address] [place] [address] since your last visit? Include any pap smears or colon screening.  No

## 2021-05-19 NOTE — PROGRESS NOTES
Subjective:     Chief Complaint   Patient presents with    Ankle swelling     bilateral - yesterday was really bad with Rash        She  is a 27y.o. year old female with recently diagnosed HTN who presents for evaluation of leg swelling and rash in both legs. She was recently started on Amlodipine 10 mg for HTN. Noticing BL ankle and lower leg swelling especially when she is on her feet at work. Noticing red rash in lower leg. No pain but feels tight. Used a new socks recently. Denies any cough, orthopnea. Pertinent items are noted in HPI. Objective:     Vitals:    05/19/21 1526   BP: 131/83   Pulse: (!) 109   Resp: 16   Temp: 97.8 °F (36.6 °C)   TempSrc: Temporal   SpO2: 100%   Weight: 171 lb (77.6 kg)   Height: 5' 2\" (1.575 m)       Physical Examination: General appearance - alert, well appearing, and in no distress, oriented to person, place, and time and overweight  Mental status - alert, oriented to person, place, and time, normal mood, behavior, speech, dress, motor activity, and thought processes  Chest - clear to auscultation, no wheezes, rales or rhonchi, symmetric air entry  Heart - normal rate, regular rhythm, normal S1, S2, no murmurs, rubs, clicks or gallops  Extremities - pedal edema 1 +  Skin - superficial patchy area noted over the medial side of the lower leg.     Allergies   Allergen Reactions    Coconut Rash    Pcn [Penicillins] Hives    Soy Hives      Social History     Socioeconomic History    Marital status: SINGLE     Spouse name: Not on file    Number of children: Not on file    Years of education: Not on file    Highest education level: Not on file   Tobacco Use    Smoking status: Never Smoker    Smokeless tobacco: Never Used   Vaping Use    Vaping Use: Never used   Substance and Sexual Activity    Alcohol use: Never    Drug use: No    Sexual activity: Yes     Partners: Female     Birth control/protection: Pill     Social Determinants of Health     Financial Resource Strain:     Difficulty of Paying Living Expenses:    Food Insecurity:     Worried About Running Out of Food in the Last Year:     [address] in the Last Year:    Transportation Needs:     Lack of Transportation (Medical):  Lack of Transportation (Non-Medical):    Physical Activity:     Days of Exercise per Week:     Minutes of Exercise per Session:    Stress:     Feeling of Stress :    Social Connections:     Frequency of Communication with Friends and Family:     Frequency of Social Gatherings with Friends and Family:     Attends [employer] Services:     Active Member of Clubs or Organizations:     Attends Club or Organization Meetings:     Marital Status:       Family History   Problem Relation Age of Onset    Depression Mother     Depression Father     Heart Disease Maternal Grandmother     Depression Maternal Grandmother     Cancer Maternal Grandfather         brain cancer    Diabetes Maternal Grandfather     Hypertension Maternal Grandfather     Stroke Maternal Grandfather     Cancer Paternal Grandfather       History reviewed. No pertinent surgical history. Past Medical History:   Diagnosis Date    Anemia     Anxiety     Arm fracture     Autism 12/27/2020    Bipolar 1 disorder (HCC)     Depression     Fibromyalgia     Hypothyroidism     IBS (irritable bowel syndrome)     Migraine       Current Outpatient Medications   Medication Sig Dispense Refill    acetaminophen (TylenoL) 325 mg tablet Take  by mouth every four (4) hours as needed for Pain.  amLODIPine (NORVASC) 5 mg tablet Take 1 Tablet by mouth daily. 90 Tablet 0    spironolactone (ALDACTONE) 25 mg tablet Take 1 Tablet by mouth daily. 30 Tablet 1    medroxyprogesterone acetate (DEPO-PROVERA IM) by IntraMUSCular route.  ubrogepant Mauricio Nelson) 100 mg tablet Take 1 Tab by mouth daily as needed for Migraine.  10 Tab 5    vit B complex 100 no.2/herbs (VITAMIN B COMPLEX 100  2-HERBS PO) vitamin B complex      biotin 1 mg cap biotin      fexofenadine (Allegra Allergy) 180 mg tablet Allegra Allergy      cholecalciferol, vitamin d3, (Vitamin D3) 10 mcg (400 unit) cap Vitamin D3      levothyroxine (Unithroid) 50 mcg tablet Unithroid 50 mcg tablet   1 po qd      topiramate (TOPAMAX) 100 mg tablet TAKE 2 TABLETS EVERY DAY      QUEtiapine (SEROQUEL) 100 mg tablet TAKE 1 AND 1/2 TABLET BY MOUTH EVERY EVENING 45 Tab 2    DULoxetine (CYMBALTA) 60 mg capsule Take 1 Cap by mouth two (2) times a day. Takes 1 cap in the am and 1 cap at night. 60 Cap 2    lithium carbonate 300 mg capsule Take 1 Cap by mouth two (2) times daily (with meals). 60 Cap 2    ibuprofen (MOTRIN) 600 mg tablet Take 1 Tab by mouth two (2) times daily (with meals). (Patient not taking: Reported on 5/19/2021) 20 Tab 0    ondansetron (ZOFRAN ODT) 4 mg disintegrating tablet ondansetron 4 mg disintegrating tablet   DISSOLVE 1 TAB TWICE A DAY (Patient not taking: Reported on 5/19/2021)      albuterol (PROVENTIL HFA, VENTOLIN HFA, PROAIR HFA) 90 mcg/actuation inhaler Take 1 Puff by inhalation every six (6) hours as needed for Shortness of Breath. (Patient not taking: Reported on 5/19/2021) 1 Inhaler 0        Assessment/ Plan:   Diagnoses and all orders for this visit:    1. Ankle swelling, unspecified laterality  -    Cut down  amLODIPine (NORVASC) 5 mg tablet; Take 1 Tablet by mouth daily.  -     Start spironolactone (ALDACTONE) 25 mg tablet; Take 1 Tablet by mouth daily. Low salt diet. 2. Essential hypertension  -     amLODIPine (NORVASC) 5 mg tablet; Take 1 Tablet by mouth daily. -     spironolactone (ALDACTONE) 25 mg tablet; Take 1 Tablet by mouth daily. 3. Irritant contact dermatitis due to other agents  -  Does not look like stasis dermatitis. Likely from socks. Advised to stop using the socks. triamcinolone acetonide (KENALOG) 0.025 % topical cream; Apply  to affected area two (2) times a day.  use thin layer as needed           Medication risks/benefits/costs/interactions/alternatives discussed with patient. Advised patient to call back or return to office if symptoms worsen/change/persist. If patient cannot reach us or should anything more severe/urgent arise he/she should proceed directly to the nearest emergency department. Discussed expected course/resolution/complications of diagnosis in detail with patient. Patient given a written after visit summary which includes her diagnoses, current medications and vitals. Patient expressed understanding with the diagnosis and plan. Follow-up and Dispositions    · Return in about 6 weeks (around 6/30/2021) for Bring BP log book. Mayelin Galarza

## 2021-07-01 ENCOUNTER — OFFICE VISIT (OUTPATIENT)
Dept: PRIMARY CARE CLINIC | Age: 26
End: 2021-07-01
Payer: MEDICAID

## 2021-07-01 VITALS
TEMPERATURE: 98.4 F | WEIGHT: 167.2 LBS | DIASTOLIC BLOOD PRESSURE: 80 MMHG | HEIGHT: 62 IN | HEART RATE: 91 BPM | SYSTOLIC BLOOD PRESSURE: 123 MMHG | BODY MASS INDEX: 30.77 KG/M2 | OXYGEN SATURATION: 99 % | RESPIRATION RATE: 16 BRPM

## 2021-07-01 DIAGNOSIS — M25.473 ANKLE SWELLING, UNSPECIFIED LATERALITY: ICD-10-CM

## 2021-07-01 DIAGNOSIS — I10 ESSENTIAL HYPERTENSION: Primary | ICD-10-CM

## 2021-07-01 PROCEDURE — 99213 OFFICE O/P EST LOW 20 MIN: CPT | Performed by: FAMILY MEDICINE

## 2021-07-01 RX ORDER — AMLODIPINE BESYLATE 5 MG/1
5 TABLET ORAL DAILY
Qty: 90 TABLET | Refills: 0 | Status: SHIPPED | OUTPATIENT
Start: 2021-07-01 | End: 2021-11-10

## 2021-07-01 RX ORDER — SPIRONOLACTONE 25 MG/1
25 TABLET ORAL DAILY
Qty: 90 TABLET | Refills: 0 | Status: SHIPPED | OUTPATIENT
Start: 2021-07-01 | End: 2022-02-01

## 2021-07-01 NOTE — PROGRESS NOTES
Identified pt with two pt identifiers(name and ). Chief Complaint   Patient presents with    Hypertension        3 most recent PHQ Screens 2020   Little interest or pleasure in doing things Not at all   Feeling down, depressed, irritable, or hopeless Not at all   Total Score PHQ 2 0        There were no vitals filed for this visit. Health Maintenance Due   Topic    Hepatitis C Screening     PAP AKA CERVICAL CYTOLOGY        1. Have you been to the ER, urgent care clinic since your last visit? Hospitalized since your last visit? No    2. Have you seen or consulted any other health care providers outside of the [address] [place] [address] since your last visit? Include any pap smears or colon screening.  YES , Endocrinologist ( [address] [place] [address] Endocrinology)

## 2021-07-01 NOTE — PROGRESS NOTES
Subjective:     Chief Complaint   Patient presents with    Hypertension    Sore Throat     small sore throat         She  is a 27y.o. year old female who presents today for follow up on blood pressure. She was started on aldactone for blood pressure and continued on Amlodipine. BP has been well controlled. No side effects. Denies any chest pain, soa, cough. She has mild throat dryness otherwise she is doing okay. Ankle swelling resolved. Pertinent items are noted in HPI.   Objective:     Vitals:    07/01/21 1100   BP: 123/80   Pulse: 91   Resp: 16   Temp: 98.4 °F (36.9 °C)   TempSrc: Temporal   SpO2: 99%   Weight: 167 lb 3.2 oz (75.8 kg)   Height: 5' 2\" (1.575 m)       Physical Examination: General appearance - alert, well appearing, and in no distress, oriented to person, place, and time and overweight  Mental status - alert, oriented to person, place, and time, normal mood, behavior, speech, dress, motor activity, and thought processes  Chest - clear to auscultation, no wheezes, rales or rhonchi, symmetric air entry  Heart - normal rate, regular rhythm, normal S1, S2, no murmurs, rubs, clicks or gallops    Allergies   Allergen Reactions    Coconut Rash    Pcn [Penicillins] Hives    Soy Hives      Social History     Socioeconomic History    Marital status: SINGLE     Spouse name: Not on file    Number of children: Not on file    Years of education: Not on file    Highest education level: Not on file   Tobacco Use    Smoking status: Never Smoker    Smokeless tobacco: Never Used   Vaping Use    Vaping Use: Never used   Substance and Sexual Activity    Alcohol use: Never    Drug use: No    Sexual activity: Yes     Partners: Female     Birth control/protection: Pill     Social Determinants of Health     Financial Resource Strain:     Difficulty of Paying Living Expenses:    Food Insecurity:     Worried About Running Out of Food in the Last Year:     [address] in the Last Year: Transportation Needs:     Lack of Transportation (Medical):  Lack of Transportation (Non-Medical):    Physical Activity:     Days of Exercise per Week:     Minutes of Exercise per Session:    Stress:     Feeling of Stress :    Social Connections:     Frequency of Communication with Friends and Family:     Frequency of Social Gatherings with Friends and Family:     Attends [employer] Services:     Active Member of Clubs or Organizations:     Attends Club or Organization Meetings:     Marital Status:       Family History   Problem Relation Age of Onset    Depression Mother     Depression Father     Heart Disease Maternal Grandmother     Depression Maternal Grandmother     Cancer Maternal Grandfather         brain cancer    Diabetes Maternal Grandfather     Hypertension Maternal Grandfather     Stroke Maternal Grandfather     Cancer Paternal Grandfather       History reviewed. No pertinent surgical history. Past Medical History:   Diagnosis Date    Anemia     Anxiety     Arm fracture     Autism 12/27/2020    Bipolar 1 disorder (HCC)     Depression     Fibromyalgia     Hypothyroidism     IBS (irritable bowel syndrome)     Migraine       Current Outpatient Medications   Medication Sig Dispense Refill    acetaminophen (TylenoL) 325 mg tablet Take  by mouth every four (4) hours as needed for Pain.  amLODIPine (NORVASC) 5 mg tablet Take 1 Tablet by mouth daily. 90 Tablet 0    spironolactone (ALDACTONE) 25 mg tablet Take 1 Tablet by mouth daily. 30 Tablet 1    triamcinolone acetonide (KENALOG) 0.025 % topical cream Apply  to affected area two (2) times a day. use thin layer as needed 15 g 0    medroxyprogesterone acetate (DEPO-PROVERA IM) by IntraMUSCular route.  ubrogepant [facility] [place]) 100 mg tablet Take 1 Tab by mouth daily as needed for Migraine. 10 Tab 5    ibuprofen (MOTRIN) 600 mg tablet Take 1 Tab by mouth two (2) times daily (with meals).  20 Tab 0    ondansetron (ZOFRAN ODT) 4 mg disintegrating tablet ondansetron 4 mg disintegrating tablet   DISSOLVE 1 TAB TWICE A DAY      vit B complex 100 no.2/herbs (VITAMIN B COMPLEX 100  2-HERBS PO) vitamin B complex      biotin 1 mg cap biotin      fexofenadine (Allegra Allergy) 180 mg tablet Allegra Allergy      cholecalciferol, vitamin d3, (Vitamin D3) 10 mcg (400 unit) cap Vitamin D3      levothyroxine (Unithroid) 50 mcg tablet Unithroid 50 mcg tablet   1 po qd      topiramate (TOPAMAX) 100 mg tablet TAKE 2 TABLETS EVERY DAY      QUEtiapine (SEROQUEL) 100 mg tablet TAKE 1 AND 1/2 TABLET BY MOUTH EVERY EVENING 45 Tab 2    DULoxetine (CYMBALTA) 60 mg capsule Take 1 Cap by mouth two (2) times a day. Takes 1 cap in the am and 1 cap at night. 60 Cap 2    lithium carbonate 300 mg capsule Take 1 Cap by mouth two (2) times daily (with meals). 60 Cap 2        Assessment/ Plan:   Diagnoses and all orders for this visit:    1. Essential hypertension  -     METABOLIC PANEL, BASIC; Future  -     spironolactone (ALDACTONE) 25 mg tablet; Take 1 Tablet by mouth daily. -     amLODIPine (NORVASC) 5 mg tablet; Take 1 Tablet by mouth daily. 2. Ankle swelling, unspecified laterality  -     spironolactone (ALDACTONE) 25 mg tablet; Take 1 Tablet by mouth daily. -     amLODIPine (NORVASC) 5 mg tablet; Take 1 Tablet by mouth daily. Medication risks/benefits/costs/interactions/alternatives discussed with patient. Advised patient to call back or return to office if symptoms worsen/change/persist. If patient cannot reach us or should anything more severe/urgent arise he/she should proceed directly to the nearest emergency department. Discussed expected course/resolution/complications of diagnosis in detail with patient. Patient given a written after visit summary which includes her diagnoses, current medications and vitals. Patient expressed understanding with the diagnosis and plan.      Follow-up and Dispositions    · Return in about 3 months (around 10/1/2021), or if symptoms worsen or fail to improve, for Bring BP log book. Марина Rued

## 2021-07-02 LAB
BUN SERPL-MCNC: 8 MG/DL (ref 6–20)
BUN/CREAT SERPL: 10 (ref 9–23)
CALCIUM SERPL-MCNC: 9.1 MG/DL (ref 8.7–10.2)
CHLORIDE SERPL-SCNC: 110 MMOL/L (ref 96–106)
CO2 SERPL-SCNC: 18 MMOL/L (ref 20–29)
CREAT SERPL-MCNC: 0.77 MG/DL (ref 0.57–1)
GLUCOSE SERPL-MCNC: 60 MG/DL (ref 65–99)
POTASSIUM SERPL-SCNC: 3.8 MMOL/L (ref 3.5–5.2)
SODIUM SERPL-SCNC: 140 MMOL/L (ref 134–144)

## 2021-07-02 NOTE — PROGRESS NOTES
Sent via my chart. Souleymane Ahmadi,    Kidney function came back normal.     Thanks.   Dr. Beulah Quevedo

## 2021-07-08 ENCOUNTER — HOSPITAL ENCOUNTER (EMERGENCY)
Age: 26
Discharge: HOME | End: 2021-07-08
Attending: EMERGENCY MEDICINE | Admitting: EMERGENCY MEDICINE
Payer: MEDICAID

## 2021-07-08 VITALS
RESPIRATION RATE: 16 BRPM | DIASTOLIC BLOOD PRESSURE: 95 MMHG | TEMPERATURE: 98.6 F | HEART RATE: 88 BPM | HEIGHT: 62 IN | SYSTOLIC BLOOD PRESSURE: 145 MMHG | OXYGEN SATURATION: 99 % | BODY MASS INDEX: 31.04 KG/M2 | WEIGHT: 168.65 LBS

## 2021-07-08 DIAGNOSIS — N93.9 VAGINAL BLEEDING: Primary | ICD-10-CM

## 2021-07-08 LAB
ALBUMIN SERPL-MCNC: 3.9 G/DL (ref 3.5–5)
ALBUMIN/GLOB SERPL: 1 {RATIO} (ref 1.1–2.2)
ALP SERPL-CCNC: 93 U/L (ref 45–117)
ALT SERPL-CCNC: 52 U/L (ref 12–78)
ANION GAP SERPL CALC-SCNC: 11 MMOL/L (ref 5–15)
APPEARANCE UR: CLEAR
AST SERPL-CCNC: 33 U/L (ref 15–37)
BACTERIA URNS QL MICRO: NEGATIVE /HPF
BASOPHILS # BLD: 0.2 K/UL (ref 0–0.1)
BASOPHILS NFR BLD: 1 % (ref 0–1)
BILIRUB SERPL-MCNC: 0.2 MG/DL (ref 0.2–1)
BILIRUB UR QL: NEGATIVE
BUN SERPL-MCNC: 10 MG/DL (ref 6–20)
BUN/CREAT SERPL: 12 (ref 12–20)
CALCIUM SERPL-MCNC: 8.7 MG/DL (ref 8.5–10.1)
CHLORIDE SERPL-SCNC: 105 MMOL/L (ref 97–108)
CLUE CELLS VAG QL WET PREP: NORMAL
CO2 SERPL-SCNC: 21 MMOL/L (ref 21–32)
COLOR UR: ABNORMAL
CREAT SERPL-MCNC: 0.81 MG/DL (ref 0.55–1.02)
DIFFERENTIAL METHOD BLD: ABNORMAL
EOSINOPHIL # BLD: 0.6 K/UL (ref 0–0.4)
EOSINOPHIL NFR BLD: 4 % (ref 0–7)
EPITH CASTS URNS QL MICRO: ABNORMAL /LPF
ERYTHROCYTE [DISTWIDTH] IN BLOOD BY AUTOMATED COUNT: 13.9 % (ref 11.5–14.5)
GLOBULIN SER CALC-MCNC: 3.8 G/DL (ref 2–4)
GLUCOSE SERPL-MCNC: 84 MG/DL (ref 65–100)
GLUCOSE UR STRIP.AUTO-MCNC: NEGATIVE MG/DL
HCG SERPL QL: NEGATIVE
HCT VFR BLD AUTO: 34.4 % (ref 35–47)
HGB BLD-MCNC: 10.9 G/DL (ref 11.5–16)
HGB BLD-MCNC: 10.9 G/DL (ref 11.5–16)
HGB UR QL STRIP: ABNORMAL
IMM GRANULOCYTES # BLD AUTO: 0.1 K/UL (ref 0–0.04)
IMM GRANULOCYTES NFR BLD AUTO: 1 % (ref 0–0.5)
KETONES UR QL STRIP.AUTO: NEGATIVE MG/DL
KOH PREP SPEC: NORMAL
LEUKOCYTE ESTERASE UR QL STRIP.AUTO: ABNORMAL
LYMPHOCYTES # BLD: 2.7 K/UL (ref 0.8–3.5)
LYMPHOCYTES NFR BLD: 20 % (ref 12–49)
MCH RBC QN AUTO: 27.9 PG (ref 26–34)
MCHC RBC AUTO-ENTMCNC: 31.7 G/DL (ref 30–36.5)
MCV RBC AUTO: 88 FL (ref 80–99)
MONOCYTES # BLD: 0.9 K/UL (ref 0–1)
MONOCYTES NFR BLD: 7 % (ref 5–13)
NEUTS SEG # BLD: 8.9 K/UL (ref 1.8–8)
NEUTS SEG NFR BLD: 67 % (ref 32–75)
NITRITE UR QL STRIP.AUTO: NEGATIVE
NRBC # BLD: 0 K/UL (ref 0–0.01)
NRBC BLD-RTO: 0 PER 100 WBC
PH UR STRIP: 6.5 [PH] (ref 5–8)
PLATELET # BLD AUTO: 361 K/UL (ref 150–400)
PMV BLD AUTO: 9 FL (ref 8.9–12.9)
POTASSIUM SERPL-SCNC: 4 MMOL/L (ref 3.5–5.1)
PROT SERPL-MCNC: 7.7 G/DL (ref 6.4–8.2)
PROT UR STRIP-MCNC: 30 MG/DL
RBC # BLD AUTO: 3.91 M/UL (ref 3.8–5.2)
RBC #/AREA URNS HPF: >100 /HPF (ref 0–5)
SERVICE CMNT-IMP: NORMAL
SODIUM SERPL-SCNC: 137 MMOL/L (ref 136–145)
SP GR UR REFRACTOMETRY: <1.005 (ref 1–1.03)
T VAGINALIS VAG QL WET PREP: NORMAL
UR CULT HOLD, URHOLD: NORMAL
UROBILINOGEN UR QL STRIP.AUTO: 0.2 EU/DL (ref 0.2–1)
WBC # BLD AUTO: 13.3 K/UL (ref 3.6–11)
WBC URNS QL MICRO: ABNORMAL /HPF (ref 0–4)

## 2021-07-08 PROCEDURE — 36415 COLL VENOUS BLD VENIPUNCTURE: CPT

## 2021-07-08 PROCEDURE — 96360 HYDRATION IV INFUSION INIT: CPT

## 2021-07-08 PROCEDURE — 87210 SMEAR WET MOUNT SALINE/INK: CPT

## 2021-07-08 PROCEDURE — 80053 COMPREHEN METABOLIC PANEL: CPT

## 2021-07-08 PROCEDURE — 84703 CHORIONIC GONADOTROPIN ASSAY: CPT

## 2021-07-08 PROCEDURE — 87491 CHLMYD TRACH DNA AMP PROBE: CPT

## 2021-07-08 PROCEDURE — 85018 HEMOGLOBIN: CPT

## 2021-07-08 PROCEDURE — 85025 COMPLETE CBC W/AUTO DIFF WBC: CPT

## 2021-07-08 PROCEDURE — 81001 URINALYSIS AUTO W/SCOPE: CPT

## 2021-07-08 PROCEDURE — 96361 HYDRATE IV INFUSION ADD-ON: CPT

## 2021-07-08 PROCEDURE — 99284 EMERGENCY DEPT VISIT MOD MDM: CPT

## 2021-07-08 PROCEDURE — 74011250636 HC RX REV CODE- 250/636: Performed by: EMERGENCY MEDICINE

## 2021-07-08 RX ORDER — MEDROXYPROGESTERONE ACETATE 5 MG/1
5 TABLET ORAL DAILY
Qty: 4 TABLET | Refills: 0 | Status: SHIPPED | OUTPATIENT
Start: 2021-07-08 | End: 2021-07-08 | Stop reason: SDUPTHER

## 2021-07-08 RX ORDER — MEDROXYPROGESTERONE ACETATE 2.5 MG/1
5 TABLET ORAL
Status: DISCONTINUED | OUTPATIENT
Start: 2021-07-08 | End: 2021-07-09 | Stop reason: HOSPADM

## 2021-07-08 RX ORDER — MEDROXYPROGESTERONE ACETATE 5 MG/1
5 TABLET ORAL DAILY
Qty: 5 TABLET | Refills: 0 | Status: SHIPPED | OUTPATIENT
Start: 2021-07-08 | End: 2021-07-17

## 2021-07-08 RX ORDER — FERROUS SULFATE 324(65)MG
324 TABLET, DELAYED RELEASE (ENTERIC COATED) ORAL
Qty: 30 TABLET | Refills: 0 | Status: SHIPPED | OUTPATIENT
Start: 2021-07-08 | End: 2021-09-24

## 2021-07-08 RX ADMIN — SODIUM CHLORIDE 1000 ML: 9 INJECTION, SOLUTION INTRAVENOUS at 20:25

## 2021-07-08 NOTE — Clinical Note
[facility]  [id number] [address]  [phone]    Work/School Note    Date: 7/8/2021    To Whom It May concern:    Deanna Arthur was seen and treated today in the emergency room by the following provider(s):  Attending Provider: Lynne Loja MD.      Deanna Arthur is excused from work/school on 07/08/21 and 07/09/21. She is medically clear to return to work/school on 7/10/2021.        Sincerely,          James Maciel MD

## 2021-07-08 NOTE — DISCHARGE INSTRUCTIONS
We hope that we have addressed all of your medical concerns. The examination and treatment you received in the Emergency Department were for an emergent problem and were not intended as complete care. It is important that you follow up with your healthcare provider(s) for ongoing care. If your symptoms worsen or do not improve as expected, and you are unable to reach your usual health care provider(s), you should return to the Emergency Department. Today's healthcare is undergoing tremendous change, and patient satisfaction surveys are one of the many tools to assess the quality of medical care. You may receive a survey from the [employer] regarding your experience in the Emergency Department. I hope that your experience has been completely positive, particularly the medical care that I provided. As such, please participate in the survey; anything less than excellent does not meet my expectations or intentions. [address] [facility] and [address] [place] [address] participate in nationally recognized quality of care measures. If your blood pressure is greater than 120/80, as reported below, we urge that you seek medical care to address the potential of high blood pressure, commonly known as hypertension. Hypertension can be hereditary or can be caused by certain medical conditions, pain, stress, or \"white coat syndrome. \"       Please make an appointment with your health care provider(s) for follow up of your Emergency Department visit. VITALS:   Patient Vitals for the past 8 hrs:   Temp Pulse Resp BP SpO2   07/08/21 1845 98.8 °F (37.1 °C) (!) 108 18 (!) 149/99 100 %          Thank you for allowing us to provide you with medical care today. We realize that you have many choices for your emergency care needs. Please choose us in the future for any continued health care needs. Beba Fragoso, [address] [facility] [address] Office: [phone]            Recent Results (from the past 24 hour(s))   CBC WITH AUTOMATED DIFF    Collection Time: 07/08/21  6:51 PM   Result Value Ref Range    WBC 13.3 (H) 3.6 - 11.0 K/uL    RBC 3.91 3.80 - 5.20 M/uL    HGB 10.9 (L) 11.5 - 16.0 g/dL    HCT 34.4 (L) 35.0 - 47.0 %    MCV 88.0 80.0 - 99.0 FL    MCH 27.9 26.0 - 34.0 PG    MCHC 31.7 30.0 - 36.5 g/dL    RDW 13.9 11.5 - 14.5 %    PLATELET 482 330 - 689 K/uL    MPV 9.0 8.9 - 12.9 FL    NRBC 0.0 0  WBC    ABSOLUTE NRBC 0.00 0.00 - 0.01 K/uL    NEUTROPHILS 67 32 - 75 %    LYMPHOCYTES 20 12 - 49 %    MONOCYTES 7 5 - 13 %    EOSINOPHILS 4 0 - 7 %    BASOPHILS 1 0 - 1 %    IMMATURE GRANULOCYTES 1 (H) 0.0 - 0.5 %    ABS. NEUTROPHILS 8.9 (H) 1.8 - 8.0 K/UL    ABS. LYMPHOCYTES 2.7 0.8 - 3.5 K/UL    ABS. MONOCYTES 0.9 0.0 - 1.0 K/UL    ABS. EOSINOPHILS 0.6 (H) 0.0 - 0.4 K/UL    ABS. BASOPHILS 0.2 (H) 0.0 - 0.1 K/UL    ABS. IMM. GRANS. 0.1 (H) 0.00 - 0.04 K/UL    DF AUTOMATED     METABOLIC PANEL, COMPREHENSIVE    Collection Time: 07/08/21  6:51 PM   Result Value Ref Range    Sodium 137 136 - 145 mmol/L    Potassium 4.0 3.5 - 5.1 mmol/L    Chloride 105 97 - 108 mmol/L    CO2 21 21 - 32 mmol/L    Anion gap 11 5 - 15 mmol/L    Glucose 84 65 - 100 mg/dL    BUN 10 6 - 20 MG/DL    Creatinine 0.81 0.55 - 1.02 MG/DL    BUN/Creatinine ratio 12 12 - 20      GFR est AA >60 >60 ml/min/1.73m2    GFR est non-AA >60 >60 ml/min/1.73m2    Calcium 8.7 8.5 - 10.1 MG/DL    Bilirubin, total 0.2 0.2 - 1.0 MG/DL    ALT (SGPT) 52 12 - 78 U/L    AST (SGOT) 33 15 - 37 U/L    Alk.  phosphatase 93 45 - 117 U/L    Protein, total 7.7 6.4 - 8.2 g/dL    Albumin 3.9 3.5 - 5.0 g/dL    Globulin 3.8 2.0 - 4.0 g/dL    A-G Ratio 1.0 (L) 1.1 - 2.2     URINALYSIS W/MICROSCOPIC    Collection Time: 07/08/21  6:51 PM   Result Value Ref Range    Color PINK      Appearance CLEAR CLEAR      Specific gravity <1.005 1.003 - 1.030    pH (UA) 6.5 5.0 - 8.0      Protein 30 (A) NEG mg/dL    Glucose Negative NEG mg/dL    Ketone Negative NEG mg/dL    Bilirubin Negative NEG      Blood LARGE (A) NEG      Urobilinogen 0.2 0.2 - 1.0 EU/dL    Nitrites Negative NEG      Leukocyte Esterase TRACE (A) NEG      WBC 0-4 0 - 4 /hpf    RBC >100 (H) 0 - 5 /hpf    Epithelial cells FEW FEW /lpf    Bacteria Negative NEG /hpf   URINE CULTURE HOLD SAMPLE    Collection Time: 07/08/21  6:51 PM    Specimen: Serum; Urine   Result Value Ref Range    Urine culture hold        Urine on hold in Microbiology dept for 2 days. If unpreserved urine is submitted, it cannot be used for addtional testing after 24 hours, recollection will be required. HCG QL SERUM    Collection Time: 07/08/21  6:51 PM   Result Value Ref Range    HCG, Ql. Negative NEG         No results found.

## 2021-07-08 NOTE — ED PROVIDER NOTES
HPI   28 yo F presents with vaginal bleeding, heavy for the past few days. Was on OCP and started depo a few months ago, had second shot yesterday. Bleeding onset 3-4 days ago. Heavier than normal period, changing pads every 1.5 hours. Denies fever, chills, cp, sob, vomiting, diarrhea. C/o cramps and headache. C/o nausea. Dr. Carlos Gonzales, called Dr. Darin Radford on second day and told to go to ER if she felt dizzy or had nausea. Called today and seen by Dr. Carlos Gonzales and told she needs to wait it out. Bleeding worse and feeling dizzy. Denies pregnancy. Of note patient was stopped on oral contraceptives due to elevated blood pressure. Past Medical History:   Diagnosis Date    Anemia     Anxiety     Arm fracture     Autism 12/27/2020    Bipolar 1 disorder (HCC)     Depression     Fibromyalgia     Hypothyroidism     IBS (irritable bowel syndrome)     Migraine        History reviewed. No pertinent surgical history.       Family History:   Problem Relation Age of Onset    Depression Mother     Depression Father     Heart Disease Maternal Grandmother     Depression Maternal Grandmother     Cancer Maternal Grandfather         brain cancer    Diabetes Maternal Grandfather     Hypertension Maternal Grandfather     Stroke Maternal Grandfather     Cancer Paternal Grandfather        Social History     Socioeconomic History    Marital status: SINGLE     Spouse name: Not on file    Number of children: Not on file    Years of education: Not on file    Highest education level: Not on file   Occupational History    Not on file   Tobacco Use    Smoking status: Never Smoker    Smokeless tobacco: Never Used   Vaping Use    Vaping Use: Never used   Substance and Sexual Activity    Alcohol use: Never    Drug use: No    Sexual activity: Yes     Partners: Female     Birth control/protection: Pill   Other Topics Concern    Not on file   Social History Narrative    Not on file     Social Determinants of Health Financial Resource Strain:     Difficulty of Paying Living Expenses:    Food Insecurity:     Worried About Running Out of Food in the Last Year:     [address] in the Last Year:    Transportation Needs:     Lack of Transportation (Medical):  Lack of Transportation (Non-Medical):    Physical Activity:     Days of Exercise per Week:     Minutes of Exercise per Session:    Stress:     Feeling of Stress :    Social Connections:     Frequency of Communication with Friends and Family:     Frequency of Social Gatherings with Friends and Family:     Attends [employer] Services:     Active Member of Clubs or Organizations:     Attends Club or Organization Meetings:     Marital Status:    Intimate Partner Violence:     Fear of Current or Ex-Partner:     Emotionally Abused:     Physically Abused:     Sexually Abused: ALLERGIES: Coconut, Pcn [penicillins], and Soy    Review of Systems   Constitutional: Negative for chills and fever. Respiratory: Negative for shortness of breath. Cardiovascular: Negative for chest pain. Gastrointestinal: Positive for nausea. Negative for diarrhea and vomiting. Genitourinary: Positive for vaginal bleeding. Negative for dysuria, hematuria and vaginal discharge. Musculoskeletal: Negative for back pain. Skin: Negative for rash. Neurological: Positive for light-headedness. All other systems reviewed and are negative.       Vitals:    07/08/21 1845   BP: (!) 149/99   Pulse: (!) 108   Resp: 18   Temp: 98.8 °F (37.1 °C)   SpO2: 100%   Weight: 76.5 kg (168 lb 10.4 oz)   Height: 5' 2\" (1.575 m)            Physical Exam   Physical Examination: General appearance - alert, well appearing, and in no distress, oriented to person, place, and time and normal appearing weight  Eyes - pupils equal and reactive, extraocular eye movements intact  Neck - supple, no significant adenopathy  Chest - clear to auscultation, no wheezes, rales or rhonchi, symmetric air entry  Heart - normal rate, regular rhythm, normal S1, S2, no murmurs, rubs, clicks or gallops  Abdomen - soft, nontender, nondistended, no masses or organomegaly  Back exam - full range of motion, no tenderness, palpable spasm or pain on motion  Neurological - alert, oriented, normal speech, no focal findings or movement disorder noted  Musculoskeletal - no joint tenderness, deformity or swelling  Extremities - peripheral pulses normal, no pedal edema, no clubbing or cyanosis  Skin - normal coloration and turgor, no rashes, no suspicious skin lesions noted   EXAM:  External genitalia normal.  Pelvic exam: cervix normal, ovaries and uterus normal size and non-tender to palpation, no cervical motion tenderness or adnexal masses. No discharge. Mild bleeding from cervix  MDM  Number of Diagnoses or Management Options     Amount and/or Complexity of Data Reviewed  Clinical lab tests: ordered and reviewed  Decide to obtain previous medical records or to obtain history from someone other than the patient: yes  Review and summarize past medical records: yes  Discuss the patient with other providers: yes (gyn)    Patient Progress  Patient progress: improved         Procedures    9:01 PM  Discussed with Dr. Rachael Ariza, ob/gyn. Discussed patient's presentation and lab results. Recommends Provera 5 mg daily for 5 days. Vital signs stable. Hemoglobin stable. Will discharge with GYN follow-up or return to ED for worsening symptoms.

## 2021-07-08 NOTE — ED NOTES
Bedside and Verbal shift change report given to Jillian Bhandari rn (oncoming nurse) by Alexandra Schultz (offgoing nurse). Report included the following information SBAR.

## 2021-07-08 NOTE — ED TRIAGE NOTES
Patient arrives ambulatory to ed with complaints of heavy bleeding and nausea for 3-4 days. Patient states she is soaking over night pads every 1-1.5 hours. Patient received her second dose of depo shot yesterday. Patient was seen at [facility] today and was told to continue to monitor symptoms.

## 2021-07-09 NOTE — ED NOTES
The provera that was ordered is not available verified by pharmacy dr. Elmer Scanlon made aware of this.

## 2021-07-11 LAB
C TRACH RRNA SPEC QL NAA+PROBE: NEGATIVE
N GONORRHOEA RRNA SPEC QL NAA+PROBE: NEGATIVE
PLEASE NOTE:, 188601: NORMAL
SPECIMEN SOURCE: NORMAL

## 2021-07-13 ENCOUNTER — HOSPITAL ENCOUNTER (INPATIENT)
Age: 26
LOS: 4 days | Discharge: HOME | DRG: 532 | End: 2021-07-17
Attending: STUDENT IN AN ORGANIZED HEALTH CARE EDUCATION/TRAINING PROGRAM | Admitting: OBSTETRICS & GYNECOLOGY
Payer: MEDICAID

## 2021-07-13 DIAGNOSIS — N93.9 VAGINAL BLEEDING: Primary | ICD-10-CM

## 2021-07-13 DIAGNOSIS — D64.9 ANEMIA, UNSPECIFIED TYPE: ICD-10-CM

## 2021-07-13 LAB
ALBUMIN SERPL-MCNC: 3.3 G/DL (ref 3.5–5)
ALBUMIN/GLOB SERPL: 1 {RATIO} (ref 1.1–2.2)
ALP SERPL-CCNC: 75 U/L (ref 45–117)
ALT SERPL-CCNC: 33 U/L (ref 12–78)
ANION GAP SERPL CALC-SCNC: 6 MMOL/L (ref 5–15)
AST SERPL-CCNC: 22 U/L (ref 15–37)
BASOPHILS # BLD: 0 K/UL (ref 0–0.1)
BASOPHILS NFR BLD: 0 % (ref 0–1)
BILIRUB SERPL-MCNC: 0.2 MG/DL (ref 0.2–1)
BUN SERPL-MCNC: 6 MG/DL (ref 6–20)
BUN/CREAT SERPL: 7 (ref 12–20)
CALCIUM SERPL-MCNC: 8.9 MG/DL (ref 8.5–10.1)
CHLORIDE SERPL-SCNC: 112 MMOL/L (ref 97–108)
CO2 SERPL-SCNC: 20 MMOL/L (ref 21–32)
COMMENT, HOLDF: NORMAL
CREAT SERPL-MCNC: 0.84 MG/DL (ref 0.55–1.02)
DIFFERENTIAL METHOD BLD: ABNORMAL
EOSINOPHIL # BLD: 0.5 K/UL (ref 0–0.4)
EOSINOPHIL NFR BLD: 3 % (ref 0–7)
ERYTHROCYTE [DISTWIDTH] IN BLOOD BY AUTOMATED COUNT: 15.5 % (ref 11.5–14.5)
GLOBULIN SER CALC-MCNC: 3.4 G/DL (ref 2–4)
GLUCOSE SERPL-MCNC: 96 MG/DL (ref 65–100)
HCT VFR BLD AUTO: 19.5 % (ref 35–47)
HGB BLD-MCNC: 6.2 G/DL (ref 11.5–16)
HISTORY CHECKED?,CKHIST: NORMAL
IMM GRANULOCYTES # BLD AUTO: 0 K/UL
IMM GRANULOCYTES NFR BLD AUTO: 0 %
LYMPHOCYTES # BLD: 4.3 K/UL (ref 0.8–3.5)
LYMPHOCYTES NFR BLD: 24 % (ref 12–49)
MCH RBC QN AUTO: 29 PG (ref 26–34)
MCHC RBC AUTO-ENTMCNC: 31.8 G/DL (ref 30–36.5)
MCV RBC AUTO: 91.1 FL (ref 80–99)
METAMYELOCYTES NFR BLD MANUAL: 1 %
MONOCYTES # BLD: 0.2 K/UL (ref 0–1)
MONOCYTES NFR BLD: 1 % (ref 5–13)
MYELOCYTES NFR BLD MANUAL: 1 %
NEUTS BAND NFR BLD MANUAL: 1 % (ref 0–6)
NEUTS SEG # BLD: 12.7 K/UL (ref 1.8–8)
NEUTS SEG NFR BLD: 69 % (ref 32–75)
NRBC # BLD: 0.02 K/UL (ref 0–0.01)
NRBC BLD-RTO: 0.1 PER 100 WBC
PLATELET # BLD AUTO: 423 K/UL (ref 150–400)
PMV BLD AUTO: 9.6 FL (ref 8.9–12.9)
POTASSIUM SERPL-SCNC: 3.3 MMOL/L (ref 3.5–5.1)
PROT SERPL-MCNC: 6.7 G/DL (ref 6.4–8.2)
RBC # BLD AUTO: 2.14 M/UL (ref 3.8–5.2)
RBC MORPH BLD: ABNORMAL
SAMPLES BEING HELD,HOLD: NORMAL
SODIUM SERPL-SCNC: 138 MMOL/L (ref 136–145)
WBC # BLD AUTO: 18.1 K/UL (ref 3.6–11)

## 2021-07-13 PROCEDURE — 65410000002 HC RM PRIVATE OB

## 2021-07-13 PROCEDURE — 86923 COMPATIBILITY TEST ELECTRIC: CPT

## 2021-07-13 PROCEDURE — 99284 EMERGENCY DEPT VISIT MOD MDM: CPT

## 2021-07-13 PROCEDURE — 30233N1 TRANSFUSION OF NONAUTOLOGOUS RED BLOOD CELLS INTO PERIPHERAL VEIN, PERCUTANEOUS APPROACH: ICD-10-PCS | Performed by: OBSTETRICS & GYNECOLOGY

## 2021-07-13 PROCEDURE — 86901 BLOOD TYPING SEROLOGIC RH(D): CPT

## 2021-07-13 PROCEDURE — P9016 RBC LEUKOCYTES REDUCED: HCPCS

## 2021-07-13 PROCEDURE — 36430 TRANSFUSION BLD/BLD COMPNT: CPT

## 2021-07-13 PROCEDURE — 74011250637 HC RX REV CODE- 250/637: Performed by: OBSTETRICS & GYNECOLOGY

## 2021-07-13 PROCEDURE — 36415 COLL VENOUS BLD VENIPUNCTURE: CPT

## 2021-07-13 PROCEDURE — 85025 COMPLETE CBC W/AUTO DIFF WBC: CPT

## 2021-07-13 PROCEDURE — 80053 COMPREHEN METABOLIC PANEL: CPT

## 2021-07-13 RX ORDER — LITHIUM CARBONATE 300 MG/1
600 TABLET, FILM COATED, EXTENDED RELEASE ORAL 2 TIMES DAILY WITH MEALS
Status: DISCONTINUED | OUTPATIENT
Start: 2021-07-14 | End: 2021-07-17 | Stop reason: HOSPADM

## 2021-07-13 RX ORDER — SPIRONOLACTONE 25 MG/1
25 TABLET ORAL DAILY
Status: DISCONTINUED | OUTPATIENT
Start: 2021-07-14 | End: 2021-07-17 | Stop reason: HOSPADM

## 2021-07-13 RX ORDER — TOPIRAMATE 100 MG/1
200 TABLET, FILM COATED ORAL 2 TIMES DAILY
Status: DISCONTINUED | OUTPATIENT
Start: 2021-07-13 | End: 2021-07-17 | Stop reason: HOSPADM

## 2021-07-13 RX ORDER — LEVOTHYROXINE SODIUM 50 UG/1
50 TABLET ORAL
Status: DISCONTINUED | OUTPATIENT
Start: 2021-07-14 | End: 2021-07-17 | Stop reason: HOSPADM

## 2021-07-13 RX ORDER — LANOLIN ALCOHOL/MO/W.PET/CERES
1 CREAM (GRAM) TOPICAL 2 TIMES DAILY WITH MEALS
Status: DISCONTINUED | OUTPATIENT
Start: 2021-07-14 | End: 2021-07-17 | Stop reason: HOSPADM

## 2021-07-13 RX ORDER — SODIUM CHLORIDE 9 MG/ML
125 INJECTION, SOLUTION INTRAVENOUS AS NEEDED
Status: DISCONTINUED | OUTPATIENT
Start: 2021-07-13 | End: 2021-07-17 | Stop reason: HOSPADM

## 2021-07-13 RX ORDER — SODIUM CHLORIDE 9 MG/ML
250 INJECTION, SOLUTION INTRAVENOUS AS NEEDED
Status: DISCONTINUED | OUTPATIENT
Start: 2021-07-13 | End: 2021-07-13

## 2021-07-13 RX ORDER — ENOXAPARIN SODIUM 100 MG/ML
40 INJECTION SUBCUTANEOUS DAILY
Status: DISCONTINUED | OUTPATIENT
Start: 2021-07-14 | End: 2021-07-14

## 2021-07-13 RX ORDER — LITHIUM CARBONATE 300 MG/1
300 CAPSULE ORAL ONCE
Status: COMPLETED | OUTPATIENT
Start: 2021-07-13 | End: 2021-07-13

## 2021-07-13 RX ORDER — DULOXETIN HYDROCHLORIDE 60 MG/1
60 CAPSULE, DELAYED RELEASE ORAL 2 TIMES DAILY
Status: DISCONTINUED | OUTPATIENT
Start: 2021-07-13 | End: 2021-07-17 | Stop reason: HOSPADM

## 2021-07-13 RX ORDER — ACETAMINOPHEN 325 MG/1
650 TABLET ORAL
Status: DISCONTINUED | OUTPATIENT
Start: 2021-07-13 | End: 2021-07-17 | Stop reason: HOSPADM

## 2021-07-13 RX ORDER — ONDANSETRON 2 MG/ML
4 INJECTION INTRAMUSCULAR; INTRAVENOUS
Status: DISCONTINUED | OUTPATIENT
Start: 2021-07-13 | End: 2021-07-17 | Stop reason: HOSPADM

## 2021-07-13 RX ORDER — ONDANSETRON 4 MG/1
4 TABLET, ORALLY DISINTEGRATING ORAL
Status: DISCONTINUED | OUTPATIENT
Start: 2021-07-13 | End: 2021-07-17 | Stop reason: HOSPADM

## 2021-07-13 RX ORDER — ACETAMINOPHEN 650 MG/1
650 SUPPOSITORY RECTAL
Status: DISCONTINUED | OUTPATIENT
Start: 2021-07-13 | End: 2021-07-17 | Stop reason: HOSPADM

## 2021-07-13 RX ORDER — AMLODIPINE BESYLATE 5 MG/1
5 TABLET ORAL DAILY
Status: DISCONTINUED | OUTPATIENT
Start: 2021-07-14 | End: 2021-07-17 | Stop reason: HOSPADM

## 2021-07-13 RX ORDER — SODIUM CHLORIDE 0.9 % (FLUSH) 0.9 %
5-40 SYRINGE (ML) INJECTION AS NEEDED
Status: DISCONTINUED | OUTPATIENT
Start: 2021-07-13 | End: 2021-07-17 | Stop reason: HOSPADM

## 2021-07-13 RX ORDER — QUETIAPINE FUMARATE 100 MG/1
150 TABLET, FILM COATED ORAL
Status: DISCONTINUED | OUTPATIENT
Start: 2021-07-13 | End: 2021-07-17 | Stop reason: HOSPADM

## 2021-07-13 RX ORDER — SODIUM CHLORIDE 0.9 % (FLUSH) 0.9 %
5-40 SYRINGE (ML) INJECTION EVERY 8 HOURS
Status: DISCONTINUED | OUTPATIENT
Start: 2021-07-13 | End: 2021-07-17 | Stop reason: HOSPADM

## 2021-07-13 RX ADMIN — LITHIUM CARBONATE 300 MG: 300 CAPSULE ORAL at 22:39

## 2021-07-13 RX ADMIN — TOPIRAMATE 200 MG: 100 TABLET, FILM COATED ORAL at 23:03

## 2021-07-13 RX ADMIN — DULOXETINE HYDROCHLORIDE 60 MG: 60 CAPSULE, DELAYED RELEASE ORAL at 22:39

## 2021-07-13 RX ADMIN — QUETIAPINE FUMARATE 150 MG: 100 TABLET ORAL at 22:39

## 2021-07-13 RX ADMIN — ESTROGENS, CONJUGATED 2.5 MG: 0.62 TABLET, FILM COATED ORAL at 22:38

## 2021-07-13 NOTE — ED TRIAGE NOTES
Pt arrives from home d/t menstrual bleeding since 7/4. She saw her gynecologist- Dr. Hussain Barron yesterday and they wanted her to come to the ER for a transfusion or possible DNC if she continued to feel bad. She is dizzy and very lethargic. Her Hgb was 7 yesterday. Pt has had multiple syncopal episodes.

## 2021-07-13 NOTE — ED NOTES
Verbal shift change report given to Gloria RN (oncoming nurse) by Petros Raymond RN (offgoing nurse). Report included the following information SBAR, Kardex, ED Summary, [facility] and Recent Results.

## 2021-07-13 NOTE — ED PROVIDER NOTES
Patient is a 64-year-old female who presents today for vaginal bleeding. Patient says that she has a history of heavy menstrual periods. She says on July 4, she noticed she started to have her menstrual period that was heavier than normal.  She was seen by her OB/GYN 2 days later where she was given a Depo shot. Patient said that on 8 July, she was evaluated in the emergency department where she was started on Provera. However, her vaginal bleeding has not stopped. She says it she is filling up a pad an hour. She was seen by her OB/GYN Dr. Lieutenant Zaldivar yesterday, who started her on a estrogen taper. Patient is in the ED today because she is feeling dizzy and lightheaded and is still having vaginal bleeding. She says she feels like she is passing out. She has not had any fevers, chest pain, or vomiting. No known sick contacts. She denies any concern for pregnancy today. Past Medical History:   Diagnosis Date    Anemia     Anxiety     Arm fracture     Autism 12/27/2020    Bipolar 1 disorder (HCC)     Depression     Fibromyalgia     Hypothyroidism     IBS (irritable bowel syndrome)     Migraine        No past surgical history on file.       Family History:   Problem Relation Age of Onset    Depression Mother     Depression Father     Heart Disease Maternal Grandmother     Depression Maternal Grandmother     Cancer Maternal Grandfather         brain cancer    Diabetes Maternal Grandfather     Hypertension Maternal Grandfather     Stroke Maternal Grandfather     Cancer Paternal Grandfather        Social History     Socioeconomic History    Marital status: SINGLE     Spouse name: Not on file    Number of children: Not on file    Years of education: Not on file    Highest education level: Not on file   Occupational History    Not on file   Tobacco Use    Smoking status: Never Smoker    Smokeless tobacco: Never Used   Vaping Use    Vaping Use: Never used   Substance and Sexual Activity    Alcohol use: Never    Drug use: No    Sexual activity: Yes     Partners: Female     Birth control/protection: Pill   Other Topics Concern    Not on file   Social History Narrative    Not on file     Social Determinants of Health     Financial Resource Strain:     Difficulty of Paying Living Expenses:    Food Insecurity:     Worried About Running Out of Food in the Last Year:     [address] in the Last Year:    Transportation Needs:     Lack of Transportation (Medical):  Lack of Transportation (Non-Medical):    Physical Activity:     Days of Exercise per Week:     Minutes of Exercise per Session:    Stress:     Feeling of Stress :    Social Connections:     Frequency of Communication with Friends and Family:     Frequency of Social Gatherings with Friends and Family:     Attends [employer] Services:     Active Member of Clubs or Organizations:     Attends Club or Organization Meetings:     Marital Status:    Intimate Partner Violence:     Fear of Current or Ex-Partner:     Emotionally Abused:     Physically Abused:     Sexually Abused: ALLERGIES: Coconut, Pcn [penicillins], and Soy    Review of Systems   Constitutional: Negative for appetite change and fever. HENT: Negative for congestion and rhinorrhea. Eyes: Negative for discharge and redness. Respiratory: Negative for cough and shortness of breath. Cardiovascular: Negative for chest pain. Gastrointestinal: Negative for abdominal pain, diarrhea, nausea and vomiting. Genitourinary: Positive for vaginal bleeding. Negative for decreased urine volume and dysuria. Musculoskeletal: Negative for back pain. Skin: Negative for rash and wound. Neurological: Positive for light-headedness. Negative for seizures and headaches. Hematological: Does not bruise/bleed easily. Psychiatric/Behavioral: Negative for agitation. All other systems reviewed and are negative.       Vitals:    07/13/21 2015 07/13/21 21   BP: 138/76 126/88 127/87 131/87   Pulse:   (!) 114    Resp:   16    Temp:   98.9 °F (37.2 °C) 98.9 °F (37.2 °C)   SpO2: 98% 100% 100% 94%            Physical Exam  Vitals and nursing note reviewed. Constitutional:       General: She is not in acute distress. Appearance: Normal appearance. HENT:      Head: Normocephalic and atraumatic. Nose: Nose normal.      Mouth/Throat:      Mouth: Mucous membranes are moist.      Pharynx: Oropharynx is clear. Eyes:      Extraocular Movements: Extraocular movements intact. Conjunctiva/sclera: Conjunctivae normal.      Pupils: Pupils are equal, round, and reactive to light. Cardiovascular:      Rate and Rhythm: Normal rate and regular rhythm. Pulses: Normal pulses. Heart sounds: Normal heart sounds. No murmur heard. No friction rub. No gallop. Pulmonary:      Effort: Pulmonary effort is normal.      Breath sounds: Normal breath sounds. Abdominal:      General: Bowel sounds are normal. There is no distension. Palpations: Abdomen is soft. Tenderness: There is no abdominal tenderness. Musculoskeletal:         General: Normal range of motion. Cervical back: Normal range of motion. Skin:     General: Skin is warm and dry. Capillary Refill: Capillary refill takes less than 2 seconds. Coloration: Skin is pale. Neurological:      General: No focal deficit present. Mental Status: She is alert and oriented to person, place, and time. Mental status is at baseline. Psychiatric:         Mood and Affect: Mood normal.          [facility]     5:40 OBGYN with [facility]         MEDICAL DECISION MAKIN y.o. female presents with Menstrual Problem and Syncope    Differential diagnosis includes but not limited to:  Menorrhagia vs Anemia vs Dysmenorrhea vs Pregnancy    Patient is a 25-year-old female who presents today for vaginal bleeding. History of menorrhagia.   She said the symptoms have been going on since 4 July. She has tried the Depo shot as well as Provera without much improvement. She was seen by her OB/GYN yesterday who started on estrogen taper. On exam, patient appears pale. She says that she is bleeding about one pad an hour. Lungs are clear. Abdomen is benign. Reviewed H&H which is 6.2/19.5 today. I spoke with on call attending for [facility] and on call hospitalist. They recommend 2 units RBC and admit for further work up. Consents completed. I updated family on plan of care. Patient is stable for the gynecology service. LABORATORY TESTS:  Labs Reviewed   CBC WITH AUTOMATED DIFF - Abnormal; Notable for the following components:       Result Value    WBC 18.1 (*)     RBC 2.14 (*)     HGB 6.2 (*)     HCT 19.5 (*)     RDW 15.5 (*)     PLATELET 206 (*)     NRBC 0.1 (*)     ABSOLUTE NRBC 0.02 (*)     MONOCYTES 1 (*)     METAMYELOCYTES 1 (*)     MYELOCYTES 1 (*)     ABS. NEUTROPHILS 12.7 (*)     ABS. LYMPHOCYTES 4.3 (*)     ABS. EOSINOPHILS 0.5 (*)     All other components within normal limits   METABOLIC PANEL, COMPREHENSIVE - Abnormal; Notable for the following components:    Potassium 3.3 (*)     Chloride 112 (*)     CO2 20 (*)     BUN/Creatinine ratio 7 (*)     Albumin 3.3 (*)     A-G Ratio 1.0 (*)     All other components within normal limits   SAMPLES BEING HELD   PTT   TYPE & SCREEN   RBC, ALLOCATE       IMAGING RESULTS:  No orders to display       MEDICATIONS GIVEN:  Medications   0.9% sodium chloride infusion 250 mL (has no administration in time range)       PROGRESS NOTE:   Gyencology consulted. 2 units pRBC ordered. CONSULTS:  [place]    IMPRESSION:  1. Vaginal bleeding    2.  Anemia, unspecified type        PLAN:  - Admit to Gynecology service    Total critical care time spent exclusive of procedures:  35 minutes    Shannan Reed MD

## 2021-07-13 NOTE — CONSULTS
Gynecology History and Physical    Name: Jayro Khanna MRN: [id number] SSN: [id number]    YOB: 1995  Age: 27 y.o. Sex: female       Chief complaint:  Vaginal bleeding    [address] Mariah is a 27 y.o. white female para 0 who presents to the ED with a complaint of vaginal bleeding and feeling very tired. She's followed by [facility] and last saw Dr. Lisbeth Calderon yesterday. She has a history of heavy menstrual periods that had been controlled on OCPs. However she developed high blood pressure and had to discontinue OCPs. She was started on Depo Provera and after the first dose things were good with her bleeding. When she got close to getting her second dose last month her bleeding picked up and didn't slow down despite getting second dose. She initially received Provera to try and slow bleeding but that didn't work so she was started on an OCP taper yesterday of 3-2-1. She took 3 yesterday and bleeding still heavy today so she came in. She goes through several pads a day. The bleeding is worst when she stands. She had an ultrasound in the office yesterday that per patient report showed a 4 cm clot in her uterus and a thin endometrium. OB History    No obstetric history on file. Past Medical History:   Diagnosis Date    Anemia     Anxiety     Arm fracture     Autism 12/27/2020    Bipolar 1 disorder (HCC)     Depression     Fibromyalgia     Hypothyroidism     IBS (irritable bowel syndrome)     Migraine      No past surgical history on file.   Social History     Occupational History    Not on file   Tobacco Use    Smoking status: Never Smoker    Smokeless tobacco: Never Used   Vaping Use    Vaping Use: Never used   Substance and Sexual Activity    Alcohol use: Never    Drug use: No    Sexual activity: Yes     Partners: Female     Birth control/protection: Pill     Family History   Problem Relation Age of Onset    Depression Mother     Depression Father     Heart Disease Maternal Grandmother     Depression Maternal Grandmother     Cancer Maternal Grandfather         brain cancer    Diabetes Maternal Grandfather     Hypertension Maternal Grandfather     Stroke Maternal Grandfather     Cancer Paternal Grandfather         Allergies   Allergen Reactions    Coconut Rash    Pcn [Penicillins] Hives    Soy Hives     Prior to Admission medications    Medication Sig Start Date End Date Taking? Authorizing Provider   ferrous sulfate 324 mg (65 mg iron) tablet Take 1 Tablet by mouth Daily (before breakfast). 7/8/21   Obdulio Taveras MD   medroxyPROGESTERone (Provera) 5 mg tablet Take 1 Tablet by mouth daily for 5 days. 7/8/21 7/13/21  Obdulio Taveras MD   spironolactone (ALDACTONE) 25 mg tablet Take 1 Tablet by mouth daily. 7/1/21   Kenneth Hurt MD   amLODIPine (NORVASC) 5 mg tablet Take 1 Tablet by mouth daily. 7/1/21   Kenneth Hurt MD   acetaminophen (TylenoL) 325 mg tablet Take  by mouth every four (4) hours as needed for Pain. Provider, Historical   triamcinolone acetonide (KENALOG) 0.025 % topical cream Apply  to affected area two (2) times a day. use thin layer as needed 5/19/21   Kenneth Hurt MD   medroxyprogesterone acetate (DEPO-PROVERA IM) by IntraMUSCular route. Other, MD Stacey   Tung Marker Nallely Lozano) 100 mg tablet Take 1 Tab by mouth daily as needed for Migraine. 3/22/21   Darwin Gunderson DO   ibuprofen (MOTRIN) 600 mg tablet Take 1 Tab by mouth two (2) times daily (with meals).  2/10/21   Kenneth Hurt MD   ondansetron (ZOFRAN ODT) 4 mg disintegrating tablet ondansetron 4 mg disintegrating tablet   DISSOLVE 1 TAB TWICE A DAY    Provider, Historical   vit B complex 100 no.2/herbs (VITAMIN B COMPLEX 100  2-HERBS PO) vitamin B complex    Provider, Historical   biotin 1 mg cap biotin    Provider, Historical   fexofenadine (Allegra Allergy) 180 mg tablet Allegra Allergy    Provider, Historical   cholecalciferol, vitamin d3, (Vitamin D3) 10 mcg (400 unit) cap Vitamin D3    Provider, Historical   levothyroxine (Unithroid) 50 mcg tablet Unithroid 50 mcg tablet   1 po qd    Provider, Historical   topiramate (TOPAMAX) 100 mg tablet TAKE 2 TABLETS EVERY DAY 7/30/20   Provider, Historical   QUEtiapine (SEROQUEL) 100 mg tablet TAKE 1 AND 1/2 TABLET BY MOUTH EVERY EVENING 11/4/19   Nilton Armendariz, NP   DULoxetine (CYMBALTA) 60 mg capsule Take 1 Cap by mouth two (2) times a day. Takes 1 cap in the am and 1 cap at night. 11/1/19   Nilton Armendariz, DENI   lithium carbonate 300 mg capsule Take 1 Cap by mouth two (2) times daily (with meals). 11/1/19   Stephanie Gresham NP        Review of Systems  A comprehensive review of systems was negative except for that written in the History of Present Illness. Objective:     Vitals:    07/13/21 1405 07/13/21 1654 07/13/21 1830   BP: 126/83 124/87 128/86   Pulse: (!) 121 (!) 114    Resp: 18 16    Temp: 98 °F (36.7 °C) 98.9 °F (37.2 °C)    SpO2: 99% 100% 100%       Physical Exam:  Patient without distress. Heart: Regular rhythm, tachy  Lung: normal respiratory effort  Abdomen: soft, nontender  Pelvic exam deferred as she states her bleeding is about what it was when she saw jenise Simpson yesterday and she had a pelvic exam at that time    Recent Results (from the past 12 hour(s))   TYPE & SCREEN    Collection Time: 07/13/21  2:26 PM   Result Value Ref Range    Crossmatch Expiration 07/16/2021,2359     ABO/Rh(D) Mikki Frees POSITIVE     Antibody screen NEG     Unit number [id number]     Blood component type [facility]     Unit division 00     Status of unit ISSUED     Crossmatch result Compatible     Unit number [id number]     Blood component type [facility]     Unit division 00     Status of unit ALLOCATED     Crossmatch result Compatible    CBC WITH AUTOMATED DIFF    Collection Time: 07/13/21  2:26 PM   Result Value Ref Range    WBC 18.1 (H) 3.6 - 11.0 K/uL    RBC 2.14 (L) 3.80 - 5.20 M/uL    HGB 6.2 (L) 11.5 - 16.0 g/dL    HCT 19.5 (L) 35.0 - 47.0 %    MCV 91.1 80.0 - 99.0 FL    MCH 29.0 26.0 - 34.0 PG    MCHC 31.8 30.0 - 36.5 g/dL    RDW 15.5 (H) 11.5 - 14.5 %    PLATELET 772 (H) 582 - 400 K/uL    MPV 9.6 8.9 - 12.9 FL    NRBC 0.1 (H) 0  WBC    ABSOLUTE NRBC 0.02 (H) 0.00 - 0.01 K/uL    NEUTROPHILS 69 32 - 75 %    BAND NEUTROPHILS 1 0 - 6 %    LYMPHOCYTES 24 12 - 49 %    MONOCYTES 1 (L) 5 - 13 %    EOSINOPHILS 3 0 - 7 %    BASOPHILS 0 0 - 1 %    METAMYELOCYTES 1 (H) 0 %    MYELOCYTES 1 (H) 0 %    IMMATURE GRANULOCYTES 0 %    ABS. NEUTROPHILS 12.7 (H) 1.8 - 8.0 K/UL    ABS. LYMPHOCYTES 4.3 (H) 0.8 - 3.5 K/UL    ABS. MONOCYTES 0.2 0.0 - 1.0 K/UL    ABS. EOSINOPHILS 0.5 (H) 0.0 - 0.4 K/UL    ABS. BASOPHILS 0.0 0.0 - 0.1 K/UL    ABS. IMM. GRANS. 0.0 K/UL    DF MANUAL      RBC COMMENTS ANISOCYTOSIS  1+        RBC COMMENTS OVALOCYTES  PRESENT        RBC COMMENTS POLYCHROMASIA  1+       METABOLIC PANEL, COMPREHENSIVE    Collection Time: 07/13/21  2:26 PM   Result Value Ref Range    Sodium 138 136 - 145 mmol/L    Potassium 3.3 (L) 3.5 - 5.1 mmol/L    Chloride 112 (H) 97 - 108 mmol/L    CO2 20 (L) 21 - 32 mmol/L    Anion gap 6 5 - 15 mmol/L    Glucose 96 65 - 100 mg/dL    BUN 6 6 - 20 MG/DL    Creatinine 0.84 0.55 - 1.02 MG/DL    BUN/Creatinine ratio 7 (L) 12 - 20      GFR est AA >60 >60 ml/min/1.73m2    GFR est non-AA >60 >60 ml/min/1.73m2    Calcium 8.9 8.5 - 10.1 MG/DL    Bilirubin, total 0.2 0.2 - 1.0 MG/DL    ALT (SGPT) 33 12 - 78 U/L    AST (SGOT) 22 15 - 37 U/L    Alk. phosphatase 75 45 - 117 U/L    Protein, total 6.7 6.4 - 8.2 g/dL    Albumin 3.3 (L) 3.5 - 5.0 g/dL    Globulin 3.4 2.0 - 4.0 g/dL    A-G Ratio 1.0 (L) 1.1 - 2.2     SAMPLES BEING HELD    Collection Time: 07/13/21  2:26 PM   Result Value Ref Range    SAMPLES BEING HELD 1RED     COMMENT        Add-on orders for these samples will be processed based on acceptable specimen integrity and analyte stability, which may vary by analyte.    RBC, ALLOCATE    Collection Time: 07/13/21  6:30 PM   Result Value Ref Range HISTORY CHECKED? Historical check performed      Assessment/Plan:     Active Problems:    Episode of heavy vaginal bleeding (7/13/2021)    Symptomatic anemia from DUB, HTN, bipolar, hypothyroid, migraines  Admit for 2U PRBCs  In setting of reported thin endometrium estrogen probably a better choice. This is fine for a short time. Started on Premarin 2.5 mg TID.   NPO after MN in case she gets [facility]  tomorrow  Written for all of her home meds  All questions answered

## 2021-07-14 LAB
ERYTHROCYTE [DISTWIDTH] IN BLOOD BY AUTOMATED COUNT: 15.5 % (ref 11.5–14.5)
HCT VFR BLD AUTO: 28.6 % (ref 35–47)
HGB BLD-MCNC: 9.2 G/DL (ref 11.5–16)
MCH RBC QN AUTO: 29.4 PG (ref 26–34)
MCHC RBC AUTO-ENTMCNC: 32.2 G/DL (ref 30–36.5)
MCV RBC AUTO: 91.4 FL (ref 80–99)
NRBC # BLD: 0.02 K/UL (ref 0–0.01)
NRBC BLD-RTO: 0.1 PER 100 WBC
PLATELET # BLD AUTO: 339 K/UL (ref 150–400)
PMV BLD AUTO: 9 FL (ref 8.9–12.9)
RBC # BLD AUTO: 3.13 M/UL (ref 3.8–5.2)
WBC # BLD AUTO: 16.5 K/UL (ref 3.6–11)

## 2021-07-14 PROCEDURE — 36415 COLL VENOUS BLD VENIPUNCTURE: CPT

## 2021-07-14 PROCEDURE — 85027 COMPLETE CBC AUTOMATED: CPT

## 2021-07-14 PROCEDURE — 74011250637 HC RX REV CODE- 250/637: Performed by: OBSTETRICS & GYNECOLOGY

## 2021-07-14 PROCEDURE — 36430 TRANSFUSION BLD/BLD COMPNT: CPT

## 2021-07-14 PROCEDURE — 65410000002 HC RM PRIVATE OB

## 2021-07-14 PROCEDURE — P9016 RBC LEUKOCYTES REDUCED: HCPCS

## 2021-07-14 RX ORDER — AMILORIDE HYDROCHLORIDE 5 MG/1
2.5 TABLET ORAL DAILY
Status: DISCONTINUED | OUTPATIENT
Start: 2021-07-14 | End: 2021-07-17 | Stop reason: HOSPADM

## 2021-07-14 RX ORDER — LITHIUM CARBONATE 300 MG/1
600 TABLET, FILM COATED, EXTENDED RELEASE ORAL 2 TIMES DAILY
COMMUNITY

## 2021-07-14 RX ADMIN — FERROUS SULFATE TAB 325 MG (65 MG ELEMENTAL FE) 325 MG: 325 (65 FE) TAB at 18:25

## 2021-07-14 RX ADMIN — LITHIUM CARBONATE 600 MG: 300 TABLET, EXTENDED RELEASE ORAL at 21:08

## 2021-07-14 RX ADMIN — AMLODIPINE BESYLATE 5 MG: 5 TABLET ORAL at 14:07

## 2021-07-14 RX ADMIN — DULOXETINE HYDROCHLORIDE 60 MG: 60 CAPSULE, DELAYED RELEASE ORAL at 14:07

## 2021-07-14 RX ADMIN — LEVOTHYROXINE SODIUM 50 MCG: 0.05 TABLET ORAL at 09:55

## 2021-07-14 RX ADMIN — ESTROGENS, CONJUGATED 2.5 MG: 0.62 TABLET, FILM COATED ORAL at 18:25

## 2021-07-14 RX ADMIN — Medication 10 ML: at 06:00

## 2021-07-14 RX ADMIN — LITHIUM CARBONATE 600 MG: 300 TABLET, EXTENDED RELEASE ORAL at 14:05

## 2021-07-14 RX ADMIN — ONDANSETRON 4 MG: 4 TABLET, ORALLY DISINTEGRATING ORAL at 11:54

## 2021-07-14 RX ADMIN — TOPIRAMATE 200 MG: 100 TABLET, FILM COATED ORAL at 14:06

## 2021-07-14 RX ADMIN — TOPIRAMATE 200 MG: 100 TABLET, FILM COATED ORAL at 21:08

## 2021-07-14 RX ADMIN — SPIRONOLACTONE 25 MG: 25 TABLET ORAL at 14:06

## 2021-07-14 RX ADMIN — ESTROGENS, CONJUGATED 2.5 MG: 0.62 TABLET, FILM COATED ORAL at 21:04

## 2021-07-14 RX ADMIN — QUETIAPINE FUMARATE 150 MG: 100 TABLET ORAL at 21:04

## 2021-07-14 RX ADMIN — ESTROGENS, CONJUGATED 2.5 MG: 0.62 TABLET, FILM COATED ORAL at 11:54

## 2021-07-14 RX ADMIN — DULOXETINE HYDROCHLORIDE 60 MG: 60 CAPSULE, DELAYED RELEASE ORAL at 21:04

## 2021-07-14 RX ADMIN — AMILORIDE HYDROCLORIDE 2.5 MG: 5 TABLET ORAL at 19:49

## 2021-07-14 NOTE — PROGRESS NOTES
Pt did not take am medication pt was afraid she would have n/v since was npo . Pt was restarted on regular diet and wanted her am medication.

## 2021-07-14 NOTE — PROGRESS NOTES
CM received phone call from patient's care coordinator Laurita Rock [phone] who can be reached for update and any transitional care planning needs.      Andreina Adames MS

## 2021-07-14 NOTE — PROGRESS NOTES
Dr. Lizett Valencia called about pt. MD updated on lab results and patients vag bleeding.  Orders received to have regular diet

## 2021-07-14 NOTE — PROGRESS NOTES
Gynecology Progress Note    Patient reports that she didn't bleed much overnight but passed a large clot and had heavier bleeding around 6am. Reports that she has bleeding every time she moves to get comfortable. She reports that she isn't dizzy anymore and feeling much better since getting her 2 units of prbcs. She denies cp/sob/n/v. Per nursing, pt was up ambulating 3 times overnight and had minimal bleeding all night. Vitals:  Blood pressure 108/75, pulse 97, temperature 98.5 °F (36.9 °C), resp. rate 16, SpO2 100 %. Temp (24hrs), Av.6 °F (37 °C), Min:98 °F (36.7 °C), Max:99.9 °F (37.7 °C)        Exam:  Patient without distress. Heart regular rate and rhythm   Lungs CTA b/l               Abdomen soft,  Mildly tender to palpation. Lower extremities are negative for swelling, cords, or tenderness. Scant spot on pad after pt reported gush of bleeding. Lab/Data Review:  CBC:   Lab Results   Component Value Date/Time    WBC 18.1 (H) 2021 02:26 PM    HGB 6.2 (L) 2021 02:26 PM    HCT 19.5 (L) 2021 02:26 PM     (H) 2021 02:26 PM       Assessment and Plan:    Symptomatic anemia from DUB, HTN, bipolar, hypothyroid, migraines  Admit for 2U PRBCs  Continue Premarin 2.5 mg TID. Discussed with pt and her mother the r/b of D&C vs medical therapy. They prefer medical therapy at this time but are in agreement with D&C if necessary. Pad count. Ambulate with assistance. May have water now but will hold on food until cbc back.

## 2021-07-15 ENCOUNTER — APPOINTMENT (OUTPATIENT)
Dept: ULTRASOUND IMAGING | Age: 26
DRG: 532 | End: 2021-07-15
Attending: OBSTETRICS & GYNECOLOGY
Payer: MEDICAID

## 2021-07-15 LAB
ABO + RH BLD: NORMAL
BLD PROD TYP BPU: NORMAL
BLD PROD TYP BPU: NORMAL
BLOOD GROUP ANTIBODIES SERPL: NORMAL
BPU ID: NORMAL
BPU ID: NORMAL
CROSSMATCH RESULT,%XM: NORMAL
CROSSMATCH RESULT,%XM: NORMAL
ERYTHROCYTE [DISTWIDTH] IN BLOOD BY AUTOMATED COUNT: 15.7 % (ref 11.5–14.5)
ERYTHROCYTE [DISTWIDTH] IN BLOOD BY AUTOMATED COUNT: 15.9 % (ref 11.5–14.5)
HCT VFR BLD AUTO: 24.5 % (ref 35–47)
HCT VFR BLD AUTO: 26.9 % (ref 35–47)
HGB BLD-MCNC: 7.9 G/DL (ref 11.5–16)
HGB BLD-MCNC: 8.6 G/DL (ref 11.5–16)
MCH RBC QN AUTO: 29.4 PG (ref 26–34)
MCH RBC QN AUTO: 29.8 PG (ref 26–34)
MCHC RBC AUTO-ENTMCNC: 32 G/DL (ref 30–36.5)
MCHC RBC AUTO-ENTMCNC: 32.2 G/DL (ref 30–36.5)
MCV RBC AUTO: 91.8 FL (ref 80–99)
MCV RBC AUTO: 92.5 FL (ref 80–99)
NRBC # BLD: 0 K/UL (ref 0–0.01)
NRBC # BLD: 0 K/UL (ref 0–0.01)
NRBC BLD-RTO: 0 PER 100 WBC
NRBC BLD-RTO: 0 PER 100 WBC
PLATELET # BLD AUTO: 277 K/UL (ref 150–400)
PLATELET # BLD AUTO: 348 K/UL (ref 150–400)
PMV BLD AUTO: 8.7 FL (ref 8.9–12.9)
PMV BLD AUTO: 9.1 FL (ref 8.9–12.9)
RBC # BLD AUTO: 2.65 M/UL (ref 3.8–5.2)
RBC # BLD AUTO: 2.93 M/UL (ref 3.8–5.2)
SPECIMEN EXP DATE BLD: NORMAL
STATUS OF UNIT,%ST: NORMAL
STATUS OF UNIT,%ST: NORMAL
UNIT DIVISION, %UDIV: 0
UNIT DIVISION, %UDIV: 0
WBC # BLD AUTO: 13 K/UL (ref 3.6–11)
WBC # BLD AUTO: 13.9 K/UL (ref 3.6–11)

## 2021-07-15 PROCEDURE — 74011250637 HC RX REV CODE- 250/637: Performed by: OBSTETRICS & GYNECOLOGY

## 2021-07-15 PROCEDURE — 74011000258 HC RX REV CODE- 258: Performed by: OBSTETRICS & GYNECOLOGY

## 2021-07-15 PROCEDURE — 74011250636 HC RX REV CODE- 250/636: Performed by: OBSTETRICS & GYNECOLOGY

## 2021-07-15 PROCEDURE — 36415 COLL VENOUS BLD VENIPUNCTURE: CPT

## 2021-07-15 PROCEDURE — 85027 COMPLETE CBC AUTOMATED: CPT

## 2021-07-15 PROCEDURE — 76830 TRANSVAGINAL US NON-OB: CPT

## 2021-07-15 PROCEDURE — 65410000002 HC RM PRIVATE OB

## 2021-07-15 PROCEDURE — 76856 US EXAM PELVIC COMPLETE: CPT

## 2021-07-15 RX ORDER — POLYETHYLENE GLYCOL 3350 17 G/17G
17 POWDER, FOR SOLUTION ORAL DAILY
Status: DISCONTINUED | OUTPATIENT
Start: 2021-07-16 | End: 2021-07-17 | Stop reason: HOSPADM

## 2021-07-15 RX ORDER — DOCUSATE SODIUM 100 MG/1
100 CAPSULE, LIQUID FILLED ORAL DAILY
Status: DISCONTINUED | OUTPATIENT
Start: 2021-07-16 | End: 2021-07-17 | Stop reason: HOSPADM

## 2021-07-15 RX ADMIN — DULOXETINE HYDROCHLORIDE 60 MG: 60 CAPSULE, DELAYED RELEASE ORAL at 21:19

## 2021-07-15 RX ADMIN — Medication 10 ML: at 21:24

## 2021-07-15 RX ADMIN — TOPIRAMATE 200 MG: 100 TABLET, FILM COATED ORAL at 09:57

## 2021-07-15 RX ADMIN — IRON SUCROSE 200 MG: 20 INJECTION, SOLUTION INTRAVENOUS at 18:10

## 2021-07-15 RX ADMIN — AMLODIPINE BESYLATE 5 MG: 5 TABLET ORAL at 09:58

## 2021-07-15 RX ADMIN — LITHIUM CARBONATE 600 MG: 300 TABLET, EXTENDED RELEASE ORAL at 09:57

## 2021-07-15 RX ADMIN — FERROUS SULFATE TAB 325 MG (65 MG ELEMENTAL FE) 325 MG: 325 (65 FE) TAB at 09:58

## 2021-07-15 RX ADMIN — ESTROGENS, CONJUGATED 2.5 MG: 0.62 TABLET, FILM COATED ORAL at 17:58

## 2021-07-15 RX ADMIN — QUETIAPINE FUMARATE 150 MG: 100 TABLET ORAL at 21:19

## 2021-07-15 RX ADMIN — SPIRONOLACTONE 25 MG: 25 TABLET ORAL at 09:57

## 2021-07-15 RX ADMIN — TOPIRAMATE 200 MG: 100 TABLET, FILM COATED ORAL at 17:58

## 2021-07-15 RX ADMIN — FERROUS SULFATE TAB 325 MG (65 MG ELEMENTAL FE) 325 MG: 325 (65 FE) TAB at 17:58

## 2021-07-15 RX ADMIN — LEVOTHYROXINE SODIUM 50 MCG: 0.05 TABLET ORAL at 06:42

## 2021-07-15 RX ADMIN — ESTROGENS, CONJUGATED 2.5 MG: 0.62 TABLET, FILM COATED ORAL at 21:19

## 2021-07-15 RX ADMIN — ESTROGENS, CONJUGATED 2.5 MG: 0.62 TABLET, FILM COATED ORAL at 11:06

## 2021-07-15 RX ADMIN — DULOXETINE HYDROCHLORIDE 60 MG: 60 CAPSULE, DELAYED RELEASE ORAL at 09:58

## 2021-07-15 RX ADMIN — SODIUM CHLORIDE 500 ML: 900 INJECTION, SOLUTION INTRAVENOUS at 14:00

## 2021-07-15 RX ADMIN — AMILORIDE HYDROCLORIDE 2.5 MG: 5 TABLET ORAL at 11:07

## 2021-07-15 RX ADMIN — LITHIUM CARBONATE 600 MG: 300 TABLET, EXTENDED RELEASE ORAL at 17:57

## 2021-07-15 NOTE — PROGRESS NOTES
Pt having increased vaginal bleeding compared to yesterday pt had moderate amount of vag bleeding with one quarter size clot noted hgb down to 7.9. Pt c/o being tried denies any pain or cramping. Called talked with Dr. Karli Richards no new orders received. MD said would call back or be in to see pt.

## 2021-07-15 NOTE — PROGRESS NOTES
Gynecology Progress Note    Patient doing well day 2 from  without significant complaints. Pain controlled on current medication. Voiding without difficulty. Patient is passing flatus. Bleeding has picked up a little but not saturating pads    Vitals:  Blood pressure 123/88, pulse (!) 108, temperature 98.8 °F (37.1 °C), resp. rate 16, SpO2 100 %. Temp (24hrs), Av.8 °F (37.1 °C), Min:98.4 °F (36.9 °C), Max:98.9 °F (37.2 °C)        Exam:  Patient without distress. Abdomen soft,  nontender. Incision dry and clean without erythema. Lower extremities are negative for swelling, cords, or tenderness. Lab/Data Review: All lab results for the last 24 hours reviewed. Assessment and Plan:  Given her orthostatics, consider repeat US. CBC now. If both stable consider cont current care, if h/h drop, 1 unit prbc and consider watch overnight with D&C tomorrow if bleeding continues to be heavy through night. - cont premarin tid 2.5mg  - consider iv iron in meantime.

## 2021-07-15 NOTE — PROGRESS NOTES
NUTRITION brief     Spoke with pt regarding allergies. Soy allergy s more of an intolerance rash/hives occurring only after high threshold of soy products. Pt eats regular breads at home (not soy free) and would like to get regular bread this admit. Spoke with Pharmacy to remove soy allergy, diet office notified to avoid soy products like tofu, soymilk etc. Keep coconut allergy.      Remington Bernardo RD

## 2021-07-15 NOTE — PROGRESS NOTES
Physician Progress Note      Yashira Boykin  CSN #:                  [id number]  :                       1995  ADMIT DATE:       2021 5:08 PM  [address] [place] DATE:  RESPONDING  PROVIDER #:        Garrett Ty DO          QUERY TEXT:    Pt admitted with vaginal bleeding  and has anemia documented. If possible, please document in progress notes and discharge summary further specificity regarding the acuity and type of anemia:      The medical record reflects the following:  Risk Factors: vaginal bleeding  Clinical Indicators:  -PN  Symptomatic anemia from DUB, HTN, bipolar, hypothyroid, migraines    2021 14:26  WBC: 18.1 (H)  HGB: 6.2 (L)  HCT: 19.5 (L)    Treatment: 2U PRBCs, Premarin 2.5 mg TID. Thank you,  Gricelda Reynoso RN, [facility] [place], [address] [place] [address]  Certified Clinical   [phone]  Options provided:  -- Anemia due to acute blood loss  -- Anemia due to acute on chronic blood loss  -- Other - I will add my own diagnosis  -- Disagree - Not applicable / Not valid  -- Disagree - Clinically unable to determine / Unknown  -- Refer to Clinical Documentation Reviewer    PROVIDER RESPONSE TEXT:    This patient has acute blood loss anemia.     Query created by: Danny Scott on 2021 10:18 AM      Electronically signed by:  Garrett Ty DO 7/15/2021 7:38 AM

## 2021-07-15 NOTE — PROGRESS NOTES
Gynecology Progress Note    Patient feeling better. Still tired with walking around and has to catch her breath with exertion but not as dizzy. Reports still with some bleeding with occasional small clots but much improved from admission. Vitals:  Blood pressure 122/79, pulse (!) 110, temperature 98.4 °F (36.9 °C), resp. rate 16, SpO2 98 %. Temp (24hrs), Av.6 °F (37 °C), Min:98.3 °F (36.8 °C), Max:98.9 °F (37.2 °C)        Exam:  Patient without distress. Heart regular rate and rhythm   Lungs CTA b/l               Lower extremities are negative for swelling, cords, or tenderness. Lab/Data Review:  CBC:   Lab Results   Component Value Date/Time    WBC 16.5 (H) 2021 09:31 AM    HGB 9.2 (L) 2021 09:31 AM    HCT 28.6 (L) 2021 09:31 AM     2021 09:31 AM       Assessment and Plan:    Symptomatic acute blood loss anemia from DUB, HTN, bipolar, hypothyroid, migraines  s/p 2U PRBCs  Continue Premarin 2.5 mg TID for now with plan to start tapering down. Repeat cbc this am. If stable and bleeding continues to be light today, will plan for discharge this afternoon.

## 2021-07-15 NOTE — PROGRESS NOTES
Bedside and Verbal shift change report given to IVANA Velasco RN (oncoming nurse) by Garfield Jenkins. Jagdish Singleton RN (offgoing nurse). Report included the following information SBAR, Kardex, Intake/Output, MAR, Accordion and Recent Results. 9274- Pt states she continues to have consistent (small) amount of bleeding, had 1 quarter sized clot and a few half dollar sized clots since 2000.

## 2021-07-15 NOTE — PROGRESS NOTES
Called Dr Bipin Lemos about lab results orders given pt can eat.  MD was going update Dr. Lewis Ellsworth about results

## 2021-07-16 VITALS
SYSTOLIC BLOOD PRESSURE: 128 MMHG | RESPIRATION RATE: 20 BRPM | HEART RATE: 100 BPM | OXYGEN SATURATION: 97 % | DIASTOLIC BLOOD PRESSURE: 77 MMHG | TEMPERATURE: 98.3 F

## 2021-07-16 PROCEDURE — 74011250637 HC RX REV CODE- 250/637: Performed by: OBSTETRICS & GYNECOLOGY

## 2021-07-16 PROCEDURE — 65410000002 HC RM PRIVATE OB

## 2021-07-16 RX ADMIN — Medication 10 ML: at 07:04

## 2021-07-16 RX ADMIN — POLYETHYLENE GLYCOL 3350 17 G: 17 POWDER, FOR SOLUTION ORAL at 08:59

## 2021-07-16 RX ADMIN — DULOXETINE HYDROCHLORIDE 60 MG: 60 CAPSULE, DELAYED RELEASE ORAL at 08:58

## 2021-07-16 RX ADMIN — SPIRONOLACTONE 25 MG: 25 TABLET ORAL at 08:57

## 2021-07-16 RX ADMIN — AMILORIDE HYDROCLORIDE 2.5 MG: 5 TABLET ORAL at 08:54

## 2021-07-16 RX ADMIN — DOCUSATE SODIUM 100 MG: 100 CAPSULE, LIQUID FILLED ORAL at 08:57

## 2021-07-16 RX ADMIN — FERROUS SULFATE TAB 325 MG (65 MG ELEMENTAL FE) 325 MG: 325 (65 FE) TAB at 08:58

## 2021-07-16 RX ADMIN — TOPIRAMATE 200 MG: 100 TABLET, FILM COATED ORAL at 08:59

## 2021-07-16 RX ADMIN — ESTROGENS, CONJUGATED 2.5 MG: 0.62 TABLET, FILM COATED ORAL at 15:10

## 2021-07-16 RX ADMIN — LITHIUM CARBONATE 600 MG: 300 TABLET, EXTENDED RELEASE ORAL at 08:58

## 2021-07-16 RX ADMIN — AMLODIPINE BESYLATE 5 MG: 5 TABLET ORAL at 08:58

## 2021-07-16 RX ADMIN — LEVOTHYROXINE SODIUM 50 MCG: 0.05 TABLET ORAL at 07:04

## 2021-07-16 RX ADMIN — ESTROGENS, CONJUGATED 2.5 MG: 0.62 TABLET, FILM COATED ORAL at 08:54

## 2021-07-16 NOTE — PROGRESS NOTES
Gynecology Progress Note    Patient feeling better. Still sob with exertion but no dizziness. Denies cp and reports that bleeding is improved. Vitals:  Blood pressure 114/72, pulse 100, temperature 98.2 °F (36.8 °C), resp. rate 16, SpO2 98 %. Temp (24hrs), Av.3 °F (36.8 °C), Min:98.2 °F (36.8 °C), Max:98.3 °F (36.8 °C)        Exam:  Patient without distress.-just awoke   Heart regular rate and rhythm   Lungs CTA b/l               Lower extremities are negative for swelling, cords, or tenderness. Lab/Data Review:  hb yesterday 8.6    Assessment and Plan:    Symptomatic acute blood loss anemia from DUB, HTN, bipolar, hypothyroid, migraines  s/p 2U PRBCs and 1 dose iv iron. Hb stable at 8.6  Will decrease Premarin 2.5 mg to BID   Discharge home today with close follow up in the office.

## 2021-07-16 NOTE — PROGRESS NOTES
1930: Bedside shift change report given to Isaiah Galan RN (oncoming nurse) by Joana Deluna RN (offgoing nurse). Report included the following information SBAR, Kardex, Intake/Output, MAR and Recent Results.

## 2021-07-16 NOTE — PROGRESS NOTES
Bedside and Verbal shift change report given to Dorian Jiang RN (oncoming nurse) by Fitz Willett RN (offgoing nurse). Report included the following information SBAR, Kardex, Intake/Output, MAR and Recent Results. @ [phone] Dr Andrew Chaney called patient states that she feels tired and short winded after coming from the bathroom. Dr also aware that pt has concerns that her premarin med is not covered by her pharmacy. Dr Stefany Cabrera connected to her room  @2304 I have reviewed discharge instructions with the patient. The patient verbalized understanding.

## 2021-07-16 NOTE — PROGRESS NOTES
Bedside and Verbal shift change report given to Sussy Encinas RN (oncoming nurse) by MARÍA Acosta RN (offgoing nurse). Report included the following information SBAR, Kardex, Intake/Output, MAR and Recent Results.

## 2021-07-16 NOTE — DISCHARGE SUMMARY
Discharge Summary     Name: Cristy Allen MRN: [id number]  SSN: [id number]    YOB: 1995  Age: 27 y.o. Sex: female      Allergies: Coconut and Pcn [penicillins]    Admit Date: 7/13/2021    Discharge Date: 7/16/2021      Admitting Physician: Chaparro Cristobal MD     * Admission Diagnoses: Acute blood loss anemia from DUB    * Discharge Diagnoses:   Hospital Problems as of 7/16/2021 Date Reviewed: 7/4/2021        Codes Class Noted - Resolved POA    Episode of heavy vaginal bleeding ICD-10-CM: N93.9  ICD-9-CM: 626.9  7/13/2021 - Present Unknown               * Procedures: 2 unit prbc's and 1 dose iv iron. * Discharge Condition: Good    [facility] Course: Patient admitted and received 2 days of premarin 2.5mg tid as well as 2 units prbcs and 1 dose iv iron. Bleeeding decreased. Discharged home and instructed to take premarin 2.5mg bid x 3 days then decrease to once daily and follow up with me next week or sooner prn.      Significant Diagnostic Studies:   Recent Results (from the past 24 hour(s))   CBC W/O DIFF    Collection Time: 07/15/21  8:14 AM   Result Value Ref Range    WBC 13.0 (H) 3.6 - 11.0 K/uL    RBC 2.65 (L) 3.80 - 5.20 M/uL    HGB 7.9 (L) 11.5 - 16.0 g/dL    HCT 24.5 (L) 35.0 - 47.0 %    MCV 92.5 80.0 - 99.0 FL    MCH 29.8 26.0 - 34.0 PG    MCHC 32.2 30.0 - 36.5 g/dL    RDW 15.9 (H) 11.5 - 14.5 %    PLATELET 449 716 - 749 K/uL    MPV 8.7 (L) 8.9 - 12.9 FL    NRBC 0.0 0  WBC    ABSOLUTE NRBC 0.00 0.00 - 0.01 K/uL   CBC W/O DIFF    Collection Time: 07/15/21  1:25 PM   Result Value Ref Range    WBC 13.9 (H) 3.6 - 11.0 K/uL    RBC 2.93 (L) 3.80 - 5.20 M/uL    HGB 8.6 (L) 11.5 - 16.0 g/dL    HCT 26.9 (L) 35.0 - 47.0 %    MCV 91.8 80.0 - 99.0 FL    MCH 29.4 26.0 - 34.0 PG    MCHC 32.0 30.0 - 36.5 g/dL    RDW 15.7 (H) 11.5 - 14.5 %    PLATELET 065 937 - 010 K/uL    MPV 9.1 8.9 - 12.9 FL    NRBC 0.0 0  WBC    ABSOLUTE NRBC 0.00 0.00 - 0.01 K/uL       * Disposition: Home    Discharge Medications:   Current Discharge Medication List      START taking these medications    Details   conjugated estrogens (PREMARIN) 1.25 mg tablet Take 2 Tablets by mouth two (2) times a day. Qty: 30 Tablet, Refills: 0  Start date: 7/16/2021         CONTINUE these medications which have NOT CHANGED    Details   lithium carbonate SR (LITHOBID) 300 mg CR tablet Take 600 mg by mouth two (2) times a day. ferrous sulfate 324 mg (65 mg iron) tablet Take 1 Tablet by mouth Daily (before breakfast). Qty: 30 Tablet, Refills: 0      spironolactone (ALDACTONE) 25 mg tablet Take 1 Tablet by mouth daily. Qty: 90 Tablet, Refills: 0    Associated Diagnoses: Essential hypertension; Ankle swelling, unspecified laterality      amLODIPine (NORVASC) 5 mg tablet Take 1 Tablet by mouth daily. Qty: 90 Tablet, Refills: 0    Associated Diagnoses: Essential hypertension; Ankle swelling, unspecified laterality      levothyroxine (Unithroid) 50 mcg tablet Unithroid 50 mcg tablet   1 po qd      topiramate (TOPAMAX) 100 mg tablet TAKE 2 TABLETS EVERY DAY      QUEtiapine (SEROQUEL) 100 mg tablet TAKE 1 AND 1/2 TABLET BY MOUTH EVERY EVENING  Qty: 45 Tab, Refills: 2      DULoxetine (CYMBALTA) 60 mg capsule Take 1 Cap by mouth two (2) times a day. Takes 1 cap in the am and 1 cap at night. Qty: 60 Cap, Refills: 2      acetaminophen (TylenoL) 325 mg tablet Take  by mouth every four (4) hours as needed for Pain.      triamcinolone acetonide (KENALOG) 0.025 % topical cream Apply  to affected area two (2) times a day. use thin layer as needed  Qty: 15 g, Refills: 0    Associated Diagnoses: Irritant contact dermatitis due to other agents      ubrogepant Jenny Stephenson) 100 mg tablet Take 1 Tab by mouth daily as needed for Migraine. Qty: 10 Tab, Refills: 5      ibuprofen (MOTRIN) 600 mg tablet Take 1 Tab by mouth two (2) times daily (with meals).   Qty: 20 Tab, Refills: 0    Associated Diagnoses: Acute chest wall pain; Costochondral chest pain ondansetron (ZOFRAN ODT) 4 mg disintegrating tablet ondansetron 4 mg disintegrating tablet   DISSOLVE 1 TAB TWICE A DAY      vit B complex 100 no.2/herbs (VITAMIN B COMPLEX 100  2-HERBS PO) vitamin B complex      biotin 1 mg cap biotin      fexofenadine (Allegra Allergy) 180 mg tablet Allegra Allergy      cholecalciferol, vitamin d3, (Vitamin D3) 10 mcg (400 unit) cap Vitamin D3         STOP taking these medications       medroxyPROGESTERone (Provera) 5 mg tablet Comments:   Reason for Stopping:         medroxyprogesterone acetate (DEPO-PROVERA IM) Comments:   Reason for Stopping:                * Follow-up Care/Patient Instructions: Activity: No sex, douching, or tampons for 6 weeks or as directed by your physician. No heavy lifting for 6 weeks. No driving while taking pain medication.   Diet: Resume pre-hospital diet  Wound Care: None needed    Follow-up Information     Follow up With Specialties Details Why Contact Info    Nicholas Singer, [address] Gynecology, Gynecology, Obstetrics In 1 week  [address] [facility] [address] [place] [address] [phone]

## 2021-07-16 NOTE — DISCHARGE INSTRUCTIONS
Patient Discharge Instructions    Lety Jensen / [id number] : 1995    Admitted 2021 Discharged: 2021       Personal Items    Please collect from your nurse all clothing which belongs to you. Please collect from the  any valuables you stored during your stay, and please remember all of your personal items, such as dentures, canes, and eyeglasses. Activity Instructions    You should avoid driving until you are no longer feeling weak/tired. You may resume work when you feel you are ready. I understand that if any problems occur once I am at home I am to contact my physician. I understand and acknowledge receipt of the instructions indicated above.

## 2021-08-11 NOTE — ADT AUTH CERT NOTES
URGENT REQUEST:     [id number]  PLEASE SEND FAX OR CALL WITH UPDATE OF DAYS APPROVED AND STATUS OF P2P DC SUMMARY HAS BEEN SUBMITTED ON 8/4     THANKS Leigh Dance

## 2021-09-24 ENCOUNTER — OFFICE VISIT (OUTPATIENT)
Dept: NEUROLOGY | Age: 26
End: 2021-09-24
Payer: MEDICAID

## 2021-09-24 VITALS
SYSTOLIC BLOOD PRESSURE: 134 MMHG | HEIGHT: 62 IN | HEART RATE: 88 BPM | BODY MASS INDEX: 29.26 KG/M2 | DIASTOLIC BLOOD PRESSURE: 84 MMHG | OXYGEN SATURATION: 98 % | WEIGHT: 159 LBS

## 2021-09-24 DIAGNOSIS — G43.009 MIGRAINE WITHOUT AURA AND WITHOUT STATUS MIGRAINOSUS, NOT INTRACTABLE: Primary | ICD-10-CM

## 2021-09-24 PROCEDURE — 99213 OFFICE O/P EST LOW 20 MIN: CPT | Performed by: PSYCHIATRY & NEUROLOGY

## 2021-09-24 RX ORDER — MEDROXYPROGESTERONE ACETATE 150 MG/ML
INJECTION, SUSPENSION INTRAMUSCULAR
COMMUNITY
Start: 2021-07-05 | End: 2021-09-24

## 2021-09-24 RX ORDER — AMILORIDE HYDROCHLORIDE 5 MG/1
5 TABLET ORAL DAILY
COMMUNITY
Start: 2021-08-30

## 2021-09-24 RX ORDER — ESTRADIOL 2 MG/1
4 TABLET ORAL 2 TIMES DAILY
COMMUNITY
Start: 2021-07-16 | End: 2021-09-24

## 2021-09-24 NOTE — PROGRESS NOTES
Chief Complaint   Patient presents with    Follow-up     migraines, \" they have been better, I also just got my prescription changed so I think that will help\"     Visit Vitals  /84 (BP 1 Location: Left upper arm, BP Patient Position: Sitting)   Pulse 88   Ht 5' 2\" (1.575 m)   Wt 159 lb (72.1 kg)   SpO2 98%   BMI 29.08 kg/m²

## 2021-09-24 NOTE — PROGRESS NOTES
Chief Complaint   Patient presents with    Follow-up     migraines, \" they have been better, I also just got my prescription changed so I think that will help\"       HPI    18-year-old woman following up. I saw her for migraine headaches. Since I saw her last she continues topiramate and Cymbalta but has been using [place] and [place] as needed. She is also going through an estrogen taper after dysfunctional uterine bleeding. She was in the hospital recently for this issue and needed transfusions. Overall she tells me she is doing okay as far as her migraines. She is not needing medication weekly even. She is getting some tension headaches right now due to hormone withdrawal.      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 She has had headaches for several years now, her mother has migraines. Since December 2020 she has had increasing severe headaches such that she is gone to the ER twice for medication management. Headaches are diffuse throbbing sometimes bifrontal with nausea light sensitivity worse with movement. She notices it possibly around her cycle but she is on birth control continuously. She is having some spotting. She has maybe 1 or 2 severe breakthrough per month lasting up to 2 days each. In December 2020 she had a 6-day event. She sees psychiatry long-term for bipolar disorder on multiple medications that have not changed. She is on topiramate now for several years. No unusual numbness weakness or vision changes. She takes Imitrex acutely but was told not to drive while using it because it makes her tired.               ROS    Past Medical History:   Diagnosis Date    Anemia     Anxiety     Arm fracture     Autism 12/27/2020    Bipolar 1 disorder (HCC)     Depression     Fibromyalgia     Hypothyroidism     IBS (irritable bowel syndrome)     Migraine Family History   Problem Relation Age of Onset    Depression Mother     Depression Father     Heart Disease Maternal Grandmother     Depression Maternal Grandmother     Cancer Maternal Grandfather         brain cancer    Diabetes Maternal Grandfather     Hypertension Maternal Grandfather     Stroke Maternal Grandfather     Cancer Paternal Grandfather      Social History     Socioeconomic History    Marital status: SINGLE     Spouse name: Not on file    Number of children: Not on file    Years of education: Not on file    Highest education level: Not on file   Occupational History    Not on file   Tobacco Use    Smoking status: Never Smoker    Smokeless tobacco: Never Used   Vaping Use    Vaping Use: Never used   Substance and Sexual Activity    Alcohol use: Never    Drug use: No    Sexual activity: Yes     Partners: Female     Birth control/protection: Pill   Other Topics Concern    Not on file   Social History Narrative    Not on file     Social Determinants of Health     Financial Resource Strain:     Difficulty of Paying Living Expenses:    Food Insecurity:     Worried About Running Out of Food in the Last Year:     Ran Out of Food in the Last Year:    Transportation Needs:     Lack of Transportation (Medical):  Lack of Transportation (Non-Medical):    Physical Activity:     Days of Exercise per Week:     Minutes of Exercise per Session:    Stress:     Feeling of Stress :    Social Connections:     Frequency of Communication with Friends and Family:     Frequency of Social Gatherings with Friends and Family:     Attends [employer] Services:     Active Member of Clubs or Organizations:     Attends Club or Organization Meetings:     Marital Status:    Intimate Partner Violence:     Fear of Current or Ex-Partner:     Emotionally Abused:     Physically Abused:     Sexually Abused:       Allergies   Allergen Reactions    Coconut Rash    Pcn [Penicillins] Hives Current Outpatient Medications   Medication Sig    aMILoride (MIDAMOR) 5 mg tablet Take 5 mg by mouth daily.  ubrogepant [place]) 100 mg tablet Take 1 Tablet by mouth daily as needed for Migraine.  lithium carbonate SR (LITHOBID) 300 mg CR tablet Take 600 mg by mouth two (2) times a day.  spironolactone (ALDACTONE) 25 mg tablet Take 1 Tablet by mouth daily.  amLODIPine (NORVASC) 5 mg tablet Take 1 Tablet by mouth daily.  ondansetron (ZOFRAN ODT) 4 mg disintegrating tablet ondansetron 4 mg disintegrating tablet   DISSOLVE 1 TAB TWICE A DAY    vit B complex 100 no.2/herbs (VITAMIN B COMPLEX 100  2-HERBS PO) vitamin B complex    biotin 1 mg cap biotin    fexofenadine (Allegra Allergy) 180 mg tablet Allegra Allergy    cholecalciferol, vitamin d3, (Vitamin D3) 10 mcg (400 unit) cap Vitamin D3    levothyroxine (Unithroid) 50 mcg tablet Unithroid 50 mcg tablet   1 po qd    topiramate (TOPAMAX) 100 mg tablet TAKE 2 TABLETS EVERY DAY    QUEtiapine (SEROQUEL) 100 mg tablet TAKE 1 AND 1/2 TABLET BY MOUTH EVERY EVENING    DULoxetine (CYMBALTA) 60 mg capsule Take 1 Cap by mouth two (2) times a day. Takes 1 cap in the am and 1 cap at night.  conjugated estrogens (PREMARIN) 1.25 mg tablet Take 2 Tablets by mouth two (2) times a day. (Patient not taking: Reported on 9/24/2021)    acetaminophen (TylenoL) 325 mg tablet Take  by mouth every four (4) hours as needed for Pain. No current facility-administered medications for this visit. Neurologic Exam    Visit Vitals  /84 (BP 1 Location: Left upper arm, BP Patient Position: Sitting)   Pulse 88   Ht 5' 2\" (1.575 m)   Wt 159 lb (72.1 kg)   SpO2 98%   BMI 29.08 kg/m²       Assessment and Plan   Diagnoses and all orders for this visit:    1. Migraine without aura and without status migrainosus, not intractable    Other orders  -     ubrogepant [place]) 100 mg tablet;  Take 1 Tablet by mouth daily as needed for Migraine. 66-year-old woman with migraine headaches having good control with her current medications to be used for the purposes of this case topiramate, Cymbalta, Seroquel. Okay to continue with Aleve as needed since she is not using it frequently. She is having some mild tension headaches but now which could be hormone related. She also had some new prescription eyewear initiated which could be contributing. Overall she seems stable. Like to see her in 2022. Treatment at the time was spent in total today reviewing medical record to include face-to-face time, and time completed documentation    I reviewed and decided to continue the current medications. This clinical note was dictated with an electronic dictation software that can make unintentional errors. If there are any questions, please contact me directly for clarification.       2 [facility], [facility][note 3] [address]  Diplomate SANJANAN

## 2021-10-12 ENCOUNTER — VIRTUAL VISIT (OUTPATIENT)
Dept: PRIMARY CARE CLINIC | Age: 26
End: 2021-10-12
Payer: MEDICAID

## 2021-10-12 DIAGNOSIS — K52.9 GASTROENTERITIS: Primary | ICD-10-CM

## 2021-10-12 DIAGNOSIS — R11.2 NON-INTRACTABLE VOMITING WITH NAUSEA, UNSPECIFIED VOMITING TYPE: ICD-10-CM

## 2021-10-12 PROCEDURE — 99213 OFFICE O/P EST LOW 20 MIN: CPT | Performed by: FAMILY MEDICINE

## 2021-10-12 RX ORDER — ONDANSETRON 4 MG/1
4 TABLET, ORALLY DISINTEGRATING ORAL
Qty: 10 TABLET | Refills: 0 | Status: SHIPPED | OUTPATIENT
Start: 2021-10-12

## 2021-10-12 NOTE — PROGRESS NOTES
Identified pt with two pt identifiers(name and ). Chief Complaint   Patient presents with    Vomiting     started 10/11 before 3pm, temp was 100 yesterday , was sent home from work.  Fatigue     body hurts , slept all day         3 most recent PHQ Screens 2020   Little interest or pleasure in doing things Not at all   Feeling down, depressed, irritable, or hopeless Not at all   Total Score PHQ 2 0        There were no vitals filed for this visit. Health Maintenance Due   Topic    Hepatitis C Screening     Pap Smear     Flu Vaccine (1)       1. Have you been to the ER, urgent care clinic since your last visit? Hospitalized since your last visit? No    2. Have you seen or consulted any other health care providers outside of the [address] [place] [address] since your last visit? Include any pap smears or colon screening.  No

## 2021-10-12 NOTE — PROGRESS NOTES
Julio [place]  27 y.o. female  1995  [address] [id number]  [id number]   [facility]   Telemedicine Progress Note  Jose Lu DO       Encounter Date and Time: October 16, 2021 at 8:36 AM    Consent: Brad Arce, who was seen by synchronous (real-time) audio-video technology, and/or her healthcare decision maker, is aware that this patient-initiated, Telehealth encounter on 10/12/2021 is a billable service, with coverage as determined by her insurance carrier. She is aware that she may receive a bill and has provided verbal consent to proceed: Yes. Chief Complaint   Patient presents with    Vomiting     started 10/11 before 3pm, temp was 100 yesterday , was sent home from work.  Fatigue     body hurts , slept all day      History of Present Illness   Brad Arce is a 27 y.o. female was evaluated by synchronous (real-time) audio-video technology from home, through a secure patient portal [employer]. me. Started Sunday afternoon. States she started vomiting on Sunday before work. Emesis x2. Non bilious or bloody. No abdominal pain. States her temp was 100 on Sunday. This morning it was 97. Endorses chills, body aches. Has not been around anyone sick but was around a small infant last week. Denies sore throat, cough, congestion. Endorses fatigue. States overall it is getting better. Had some crackers today and has not vomited yet. Has been taking an old script of Zofran. No diarrhea. Review of Systems   Review of Systems   Constitutional: Positive for chills and fever. HENT: Negative for congestion and sore throat. Respiratory: Negative for cough. Gastrointestinal: Positive for nausea and vomiting. Negative for abdominal pain and diarrhea.      Vitals/Objective:     General: alert, cooperative, no distress   Mental  status: mental status: alert, oriented to person, place, and time, normal mood, behavior, speech, dress, motor activity, and thought processes   Resp: resp: normal effort and no respiratory distress   Neuro: neuro: no gross deficits   Skin: skin: no discoloration or lesions of concern on visible areas   Due to this being a TeleHealth evaluation, many elements of the physical examination are unable to be assessed. Assessment and Plan:   Time-based coding, delete if not needed: I spent at least 5 minutes with this established patient, and >50% of the time was spent counseling and/or coordinating care regarding gastroenteritis    1. Gastroenteritis  2. Non-intractable vomiting with nausea, unspecified vomiting type  - ondansetron (ZOFRAN ODT) 4 mg disintegrating tablet; Take 1 Tablet by mouth every eight (8) hours as needed for Nausea or Vomiting. Dispense: 10 Tablet; Refill: 0    1. BRAT diet discussed  2. Zofran sent  3. ER precautions given - abd pain, lethargy, decreased PO intake/ UOP  4. Recommend COVID testing before returning to work given fever    Time spent in direct conversation with the patient to include medical condition(s) discussed, assessment and treatment plan: 8 min    We discussed the expected course, resolution and complications of the diagnosis(es) in detail. Medication risks, benefits, costs, interactions, and alternatives were discussed as indicated. I advised her to contact the office if her condition worsens, changes or fails to improve as anticipated. She expressed understanding with the diagnosis(es) and plan. Patient understands that this encounter was a temporary measure, and the importance of further follow up and examination was emphasized. Patient verbalized understanding. Electronically Signed: DO Jaime Paniagua is a 27 y.o. female who was evaluated by an audio-video encounter for concerns as above. Patient identification was verified prior to start of the visit. A caregiver was present when appropriate.  Due to this being a TeleHealth encounter (During SIYBL-90 public health emergency), evaluation of the following organ systems was limited: Vitals/Constitutional/EENT/Resp/CV/GI//MS/Neuro/Skin/Heme-Lymph-Imm. Pursuant to the emergency declaration under the [facility], [facility]5 waiver authority and the [employer] Act, this Virtual Visit was conducted, with patient's (and/or legal guardian's) consent, to reduce the patient's risk of exposure to COVID-19 and provide necessary medical care. Services were provided through a synchronous discussion virtually to substitute for in-person clinic visit. I was in the office. The patient was at home. History   Patients past medical, surgical and family histories were reviewed and updated. Past Medical History:   Diagnosis Date    Anemia     Anxiety     Arm fracture     Autism 12/27/2020    Bipolar 1 disorder (HCC)     Depression     Fibromyalgia     Hypothyroidism     IBS (irritable bowel syndrome)     Migraine      History reviewed. No pertinent surgical history.   Family History   Problem Relation Age of Onset    Depression Mother     Depression Father     Heart Disease Maternal Grandmother     Depression Maternal Grandmother     Cancer Maternal Grandfather         brain cancer    Diabetes Maternal Grandfather     Hypertension Maternal Grandfather     Stroke Maternal Grandfather     Cancer Paternal Grandfather      Social History     Socioeconomic History    Marital status: SINGLE     Spouse name: Not on file    Number of children: Not on file    Years of education: Not on file    Highest education level: Not on file   Occupational History    Not on file   Tobacco Use    Smoking status: Never Smoker    Smokeless tobacco: Never Used   Vaping Use    Vaping Use: Never used   Substance and Sexual Activity    Alcohol use: Never    Drug use: No    Sexual activity: Yes     Partners: Female     Birth control/protection: Pill Other Topics Concern    Not on file   Social History Narrative    Not on file     Social Determinants of Health     Financial Resource Strain:     Difficulty of Paying Living Expenses:    Food Insecurity:     Worried About Running Out of Food in the Last Year:     [address] in the Last Year:    Transportation Needs:     Lack of Transportation (Medical):  Lack of Transportation (Non-Medical):    Physical Activity:     Days of Exercise per Week:     Minutes of Exercise per Session:    Stress:     Feeling of Stress :    Social Connections:     Frequency of Communication with Friends and Family:     Frequency of Social Gatherings with Friends and Family:     Attends [employer] Services:     Active Member of Clubs or Organizations:     Attends Club or Organization Meetings:     Marital Status:    Intimate Partner Violence:     Fear of Current or Ex-Partner:     Emotionally Abused:     Physically Abused:     Sexually Abused:      Patient Active Problem List   Diagnosis Code    Low serum vitamin D R79.89    Iron deficiency anemia D50.9    Bipolar 1 disorder ([facility] [address] F31.9    Obesity (BMI 30-39. 9) E66.9    Fibromyalgia M79.7    Essential hypertension I10    Episode of heavy vaginal bleeding N93.9          Current Medications/Allergies   Medications and Allergies reviewed:    Current Outpatient Medications   Medication Sig Dispense Refill    ondansetron (ZOFRAN ODT) 4 mg disintegrating tablet Take 1 Tablet by mouth every eight (8) hours as needed for Nausea or Vomiting. 10 Tablet 0    aMILoride (MIDAMOR) 5 mg tablet Take 5 mg by mouth daily. 1/2 tab      ubrogepant [place]) 100 mg tablet Take 1 Tablet by mouth daily as needed for Migraine. 10 Tablet 5    lithium carbonate SR (LITHOBID) 300 mg CR tablet Take 600 mg by mouth two (2) times a day.  spironolactone (ALDACTONE) 25 mg tablet Take 1 Tablet by mouth daily.  90 Tablet 0    amLODIPine (NORVASC) 5 mg tablet Take 1 Tablet by mouth daily. 90 Tablet 0    acetaminophen (TylenoL) 325 mg tablet Take  by mouth every four (4) hours as needed for Pain.  vit B complex 100 no.2/herbs (VITAMIN B COMPLEX 100  2-HERBS PO) vitamin B complex      biotin 1 mg cap biotin      fexofenadine (Allegra Allergy) 180 mg tablet Allegra Allergy      cholecalciferol, vitamin d3, (Vitamin D3) 10 mcg (400 unit) cap Vitamin D3      levothyroxine (Unithroid) 50 mcg tablet Unithroid 50 mcg tablet   1 po qd      topiramate (TOPAMAX) 100 mg tablet TAKE 2 TABLETS EVERY DAY      QUEtiapine (SEROQUEL) 100 mg tablet TAKE 1 AND 1/2 TABLET BY MOUTH EVERY EVENING 45 Tab 2    DULoxetine (CYMBALTA) 60 mg capsule Take 1 Cap by mouth two (2) times a day. Takes 1 cap in the am and 1 cap at night. 60 Cap 2    conjugated estrogens (PREMARIN) 1.25 mg tablet Take 2 Tablets by mouth two (2) times a day.  (Patient not taking: Reported on 9/24/2021) 30 Tablet 0     Allergies   Allergen Reactions    Coconut Rash    Pcn [Penicillins] Hives    Soy Hives

## 2021-10-16 DIAGNOSIS — M25.473 ANKLE SWELLING, UNSPECIFIED LATERALITY: ICD-10-CM

## 2021-10-16 DIAGNOSIS — I10 ESSENTIAL HYPERTENSION: ICD-10-CM

## 2021-10-27 NOTE — TELEPHONE ENCOUNTER
Requested Prescriptions     Pending Prescriptions Disp Refills    spironolactone (ALDACTONE) 25 mg tablet [Pharmacy Med Name: SPIRONOLACTONE 25 MG TABLET] 90 Tablet 0     Sig: TAKE 1 TABLET BY MOUTH EVERY DAY        Last Visit 10/12/21  Last Refill 07/01/21

## 2021-10-28 ENCOUNTER — TELEPHONE (OUTPATIENT)
Dept: PRIMARY CARE CLINIC | Age: 26
End: 2021-10-28

## 2021-10-28 RX ORDER — SPIRONOLACTONE 25 MG/1
TABLET ORAL
Qty: 90 TABLET | Refills: 0 | OUTPATIENT
Start: 2021-10-28

## 2021-10-28 NOTE — TELEPHONE ENCOUNTER
----- Message from Rubens Bahena sent at 10/28/2021 10:08 AM EDT -----  Subject: Message to Provider    QUESTIONS  Information for Provider? Patient is returning phone call from [address].   ---------------------------------------------------------------------------  --------------  [address]  What is the best way for the office to contact you? OK to leave message on   voicemail  Preferred Call Back Phone Number? [phone]  ---------------------------------------------------------------------------  --------------  SCRIPT ANSWERS  Relationship to Patient?  Self

## 2021-10-28 NOTE — TELEPHONE ENCOUNTER
I was not aware that she was on Amiloride when I started on spironolactone on 5/19/2021. Amiloride was not listed in her med list.     It's not safe to take both med. Why is she taking Amiloride? She may ask her endo to increase the dose of the med if appropriate to help with both blood pressure as well as whatever the reason she is taking the med for. We can talk about different options in office if that does not work.

## 2021-10-28 NOTE — TELEPHONE ENCOUNTER
Please call patient:     I see that according to chart she is taking Amiloride. Spironolactone is similar type of med. She should not take both med together. Why did she start Amiloride and who started?

## 2021-10-28 NOTE — TELEPHONE ENCOUNTER
Spoke to patient to inform of information, pt states she takes the medication for her diabetes insipidus. Pt states she takes 1/2 5mg tablet. Pt informed that its not safe to take both medications because it can increase her potassium which can affect her heart. Pt  wanted to know what should she do about it because she has been taking the medication together for years. I informed patient that we were not aware of it because it looks like it wasn't on her med list until august. Pt states that she is not sure how we didn't know that she was not taking this, pt stated what are ya'll doing over there and that she did not want to reschedule with us because we aren't competent to not know this.  Patient hung up phone before I could say anything else

## 2021-10-28 NOTE — TELEPHONE ENCOUNTER
Called patient to ask her about her spironolactone and amiloride pt states she was given the amiloride from her Endocrinologist but she has been taking it together for months. Pt states she asked you about it before and was told it was fine to take. Pt states it was because she couldn't take any other BP meds and her norvasc was cut down.

## 2021-11-10 DIAGNOSIS — M25.473 ANKLE SWELLING, UNSPECIFIED LATERALITY: ICD-10-CM

## 2021-11-10 DIAGNOSIS — I10 ESSENTIAL HYPERTENSION: ICD-10-CM

## 2021-11-10 RX ORDER — AMLODIPINE BESYLATE 5 MG/1
TABLET ORAL
Qty: 90 TABLET | Refills: 0 | Status: SHIPPED | OUTPATIENT
Start: 2021-11-10 | End: 2022-01-29

## 2021-12-02 ENCOUNTER — TELEPHONE (OUTPATIENT)
Dept: NEUROLOGY | Age: 26
End: 2021-12-02

## 2021-12-02 NOTE — TELEPHONE ENCOUNTER
When I saw her in September she was having the occasional migraine maybe every other week. Did something change? How many days a week does she have symptoms now? Is she still taking topiramate and other other new stressors. I can certainly add or change something but I just want to make sure we understand what changed.

## 2021-12-02 NOTE — TELEPHONE ENCOUNTER
Patient has been having migraines and she doesn't feel like her preventative medication is working. She would like a call back.

## 2021-12-10 NOTE — TELEPHONE ENCOUNTER
Patient called back, M with Dr. Verna Cesar response and asked to call back with details or to send a message thru [address] [place] [address] [phone].

## 2021-12-23 LAB — SARS-COV-2, NAA: NOT DETECTED

## 2022-02-01 ENCOUNTER — APPOINTMENT (OUTPATIENT)
Dept: CT IMAGING | Age: 27
End: 2022-02-01
Attending: EMERGENCY MEDICINE
Payer: MEDICAID

## 2022-02-01 ENCOUNTER — APPOINTMENT (OUTPATIENT)
Dept: ULTRASOUND IMAGING | Age: 27
End: 2022-02-01
Attending: EMERGENCY MEDICINE
Payer: MEDICAID

## 2022-02-01 ENCOUNTER — HOSPITAL ENCOUNTER (EMERGENCY)
Age: 27
Discharge: HOME | End: 2022-02-01
Attending: STUDENT IN AN ORGANIZED HEALTH CARE EDUCATION/TRAINING PROGRAM
Payer: MEDICAID

## 2022-02-01 VITALS
DIASTOLIC BLOOD PRESSURE: 95 MMHG | BODY MASS INDEX: 28.52 KG/M2 | SYSTOLIC BLOOD PRESSURE: 129 MMHG | WEIGHT: 155 LBS | HEIGHT: 62 IN | OXYGEN SATURATION: 100 % | RESPIRATION RATE: 18 BRPM | HEART RATE: 102 BPM | TEMPERATURE: 98 F

## 2022-02-01 DIAGNOSIS — N83.202 LEFT OVARIAN CYST: ICD-10-CM

## 2022-02-01 DIAGNOSIS — R10.31 ABDOMINAL PAIN, RIGHT LOWER QUADRANT: Primary | ICD-10-CM

## 2022-02-01 LAB
ALBUMIN SERPL-MCNC: 4.1 G/DL (ref 3.5–5)
ALBUMIN/GLOB SERPL: 0.9 {RATIO} (ref 1.1–2.2)
ALP SERPL-CCNC: 91 U/L (ref 45–117)
ALT SERPL-CCNC: 20 U/L (ref 12–78)
ANION GAP SERPL CALC-SCNC: 13 MMOL/L (ref 5–15)
APPEARANCE UR: CLEAR
AST SERPL-CCNC: 16 U/L (ref 15–37)
BACTERIA URNS QL MICRO: NEGATIVE /HPF
BASOPHILS # BLD: 0.1 K/UL (ref 0–0.1)
BASOPHILS NFR BLD: 1 % (ref 0–1)
BILIRUB SERPL-MCNC: 0.4 MG/DL (ref 0.2–1)
BILIRUB UR QL: NEGATIVE
BUN SERPL-MCNC: 8 MG/DL (ref 6–20)
BUN/CREAT SERPL: 8 (ref 12–20)
CALCIUM SERPL-MCNC: 8.7 MG/DL (ref 8.5–10.1)
CHLORIDE SERPL-SCNC: 106 MMOL/L (ref 97–108)
CO2 SERPL-SCNC: 21 MMOL/L (ref 21–32)
COLOR UR: ABNORMAL
CREAT SERPL-MCNC: 1 MG/DL (ref 0.55–1.02)
DIFFERENTIAL METHOD BLD: ABNORMAL
EOSINOPHIL # BLD: 0.2 K/UL (ref 0–0.4)
EOSINOPHIL NFR BLD: 2 % (ref 0–7)
EPITH CASTS URNS QL MICRO: ABNORMAL /LPF
ERYTHROCYTE [DISTWIDTH] IN BLOOD BY AUTOMATED COUNT: 13.6 % (ref 11.5–14.5)
GLOBULIN SER CALC-MCNC: 4.5 G/DL (ref 2–4)
GLUCOSE SERPL-MCNC: 100 MG/DL (ref 65–100)
GLUCOSE UR STRIP.AUTO-MCNC: NEGATIVE MG/DL
HCG SERPL QL: NEGATIVE
HCT VFR BLD AUTO: 41.3 % (ref 35–47)
HGB BLD-MCNC: 12.9 G/DL (ref 11.5–16)
HGB UR QL STRIP: ABNORMAL
IMM GRANULOCYTES # BLD AUTO: 0 K/UL (ref 0–0.04)
IMM GRANULOCYTES NFR BLD AUTO: 0 % (ref 0–0.5)
KETONES UR QL STRIP.AUTO: NEGATIVE MG/DL
LEUKOCYTE ESTERASE UR QL STRIP.AUTO: NEGATIVE
LIPASE SERPL-CCNC: 104 U/L (ref 73–393)
LYMPHOCYTES # BLD: 1.5 K/UL (ref 0.8–3.5)
LYMPHOCYTES NFR BLD: 15 % (ref 12–49)
MCH RBC QN AUTO: 27.6 PG (ref 26–34)
MCHC RBC AUTO-ENTMCNC: 31.2 G/DL (ref 30–36.5)
MCV RBC AUTO: 88.2 FL (ref 80–99)
MONOCYTES # BLD: 0.5 K/UL (ref 0–1)
MONOCYTES NFR BLD: 5 % (ref 5–13)
NEUTS SEG # BLD: 7.4 K/UL (ref 1.8–8)
NEUTS SEG NFR BLD: 77 % (ref 32–75)
NITRITE UR QL STRIP.AUTO: NEGATIVE
NRBC # BLD: 0 K/UL (ref 0–0.01)
NRBC BLD-RTO: 0 PER 100 WBC
PH UR STRIP: 6.5 [PH] (ref 5–8)
PLATELET # BLD AUTO: 375 K/UL (ref 150–400)
PMV BLD AUTO: 9.4 FL (ref 8.9–12.9)
POTASSIUM SERPL-SCNC: 3.2 MMOL/L (ref 3.5–5.1)
PROT SERPL-MCNC: 8.6 G/DL (ref 6.4–8.2)
PROT UR STRIP-MCNC: NEGATIVE MG/DL
RBC # BLD AUTO: 4.68 M/UL (ref 3.8–5.2)
RBC #/AREA URNS HPF: ABNORMAL /HPF (ref 0–5)
SODIUM SERPL-SCNC: 140 MMOL/L (ref 136–145)
SP GR UR REFRACTOMETRY: <1.005 (ref 1–1.03)
UR CULT HOLD, URHOLD: NORMAL
UROBILINOGEN UR QL STRIP.AUTO: 0.2 EU/DL (ref 0.2–1)
WBC # BLD AUTO: 9.8 K/UL (ref 3.6–11)
WBC URNS QL MICRO: ABNORMAL /HPF (ref 0–4)

## 2022-02-01 PROCEDURE — 74177 CT ABD & PELVIS W/CONTRAST: CPT

## 2022-02-01 PROCEDURE — 96361 HYDRATE IV INFUSION ADD-ON: CPT

## 2022-02-01 PROCEDURE — 76856 US EXAM PELVIC COMPLETE: CPT

## 2022-02-01 PROCEDURE — 96374 THER/PROPH/DIAG INJ IV PUSH: CPT

## 2022-02-01 PROCEDURE — 83690 ASSAY OF LIPASE: CPT

## 2022-02-01 PROCEDURE — 99283 EMERGENCY DEPT VISIT LOW MDM: CPT

## 2022-02-01 PROCEDURE — 80053 COMPREHEN METABOLIC PANEL: CPT

## 2022-02-01 PROCEDURE — 81001 URINALYSIS AUTO W/SCOPE: CPT

## 2022-02-01 PROCEDURE — 36415 COLL VENOUS BLD VENIPUNCTURE: CPT

## 2022-02-01 PROCEDURE — 74011250636 HC RX REV CODE- 250/636: Performed by: EMERGENCY MEDICINE

## 2022-02-01 PROCEDURE — 85025 COMPLETE CBC W/AUTO DIFF WBC: CPT

## 2022-02-01 PROCEDURE — 74011000636 HC RX REV CODE- 636: Performed by: STUDENT IN AN ORGANIZED HEALTH CARE EDUCATION/TRAINING PROGRAM

## 2022-02-01 PROCEDURE — 76830 TRANSVAGINAL US NON-OB: CPT

## 2022-02-01 PROCEDURE — 84703 CHORIONIC GONADOTROPIN ASSAY: CPT

## 2022-02-01 RX ORDER — KETOROLAC TROMETHAMINE 30 MG/ML
15 INJECTION, SOLUTION INTRAMUSCULAR; INTRAVENOUS
Status: COMPLETED | OUTPATIENT
Start: 2022-02-01 | End: 2022-02-01

## 2022-02-01 RX ORDER — DICYCLOMINE HYDROCHLORIDE 20 MG/1
20 TABLET ORAL EVERY 6 HOURS
Qty: 20 TABLET | Refills: 0 | Status: SHIPPED | OUTPATIENT
Start: 2022-02-01

## 2022-02-01 RX ORDER — ONDANSETRON 4 MG/1
4 TABLET, FILM COATED ORAL
Qty: 15 TABLET | Refills: 0 | Status: SHIPPED | OUTPATIENT
Start: 2022-02-01

## 2022-02-01 RX ADMIN — KETOROLAC TROMETHAMINE 15 MG: 30 INJECTION, SOLUTION INTRAMUSCULAR; INTRAVENOUS at 15:37

## 2022-02-01 RX ADMIN — IOPAMIDOL 100 ML: 755 INJECTION, SOLUTION INTRAVENOUS at 16:23

## 2022-02-01 RX ADMIN — SODIUM CHLORIDE 1000 ML: 9 INJECTION, SOLUTION INTRAVENOUS at 15:37

## 2022-02-01 NOTE — ED PROVIDER NOTES
HPI patient is a 29-year-old female with past medical history significant for anemia, anxiety, autism, bipolar disorder, depression, fibromyalgia, migraines, hypothyroidism and irritable bowel syndrome who presents to the ED with c/o right lower quadrant abdominal pain for over 1 week. [facility] and was sent to rule out appendicitis. Denies any fever, difficulty breathing, difficulty swallowing, SOB or chest pain. Denies cold symptoms,headache, neck pain, visual changes, focal weakness or rash. Pt. Reports not had any food or medications today prior to arrival.  She states that she does repetitive pushing, pulling and lifting at work daily. Past Medical History:   Diagnosis Date    Anemia     Anxiety     Arm fracture     Autism 12/27/2020    Bipolar 1 disorder (HCC)     Depression     Fibromyalgia     Hypothyroidism     IBS (irritable bowel syndrome)     Migraine        History reviewed. No pertinent surgical history.       Family History:   Problem Relation Age of Onset    Depression Mother     Depression Father     Heart Disease Maternal Grandmother     Depression Maternal Grandmother     Cancer Maternal Grandfather         brain cancer    Diabetes Maternal Grandfather     Hypertension Maternal Grandfather     Stroke Maternal Grandfather     Cancer Paternal Grandfather        Social History     Socioeconomic History    Marital status: SINGLE     Spouse name: Not on file    Number of children: Not on file    Years of education: Not on file    Highest education level: Not on file   Occupational History    Not on file   Tobacco Use    Smoking status: Never Smoker    Smokeless tobacco: Never Used   Vaping Use    Vaping Use: Never used   Substance and Sexual Activity    Alcohol use: Never    Drug use: No    Sexual activity: Yes     Partners: Female     Birth control/protection: Pill   Other Topics Concern    Not on file   Social History Narrative    Not on file     Social Determinants of Health     Financial Resource Strain:     Difficulty of Paying Living Expenses: Not on file   Food Insecurity:     Worried About Running Out of Food in the Last Year: Not on file    Sang of Food in the Last Year: Not on file   Transportation Needs:     Lack of Transportation (Medical): Not on file    Lack of Transportation (Non-Medical): Not on file   Physical Activity:     Days of Exercise per Week: Not on file    Minutes of Exercise per Session: Not on file   Stress:     Feeling of Stress : Not on file   Social Connections:     Frequency of Communication with Friends and Family: Not on file    Frequency of Social Gatherings with Friends and Family: Not on file    Attends Faith Services: Not on file    Active Member of [address] [place] [address] [employer] or Organizations: Not on file    Attends Club or Organization Meetings: Not on file    Marital Status: Not on file   Intimate Partner Violence:     Fear of Current or Ex-Partner: Not on file    Emotionally Abused: Not on file    Physically Abused: Not on file    Sexually Abused: Not on file   Housing Stability:     Unable to Pay for Housing in the Last Year: Not on file    Number of [place] in the Last Year: Not on file    Unstable Housing in the Last Year: Not on file         ALLERGIES: Coconut, Pcn [penicillins], and Soy    Review of Systems   Constitutional: Positive for activity change and appetite change. Negative for fever and unexpected weight change. HENT: Negative for sore throat and trouble swallowing. Eyes: Negative for visual disturbance. Respiratory: Negative for cough and shortness of breath. Cardiovascular: Negative for chest pain, palpitations and leg swelling. Gastrointestinal: Positive for abdominal pain, nausea and vomiting. Negative for constipation and diarrhea. Genitourinary: Negative for dysuria. Musculoskeletal: Negative for back pain and neck pain.    Skin: Negative for rash.   Neurological: Negative for headaches. All other systems reviewed and are negative. Vitals:    02/01/22 1430   BP: (!) 150/102   Pulse: (!) 102   Resp: 18   Temp: 98 °F (36.7 °C)   SpO2: 100%   Weight: 70.3 kg (155 lb)   Height: 5' 2\" (1.575 m)            Physical Exam  Constitutional:       General: She is not in acute distress. Appearance: She is well-developed. She is not ill-appearing, toxic-appearing or diaphoretic. 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 Mouth/Throat:      Mouth: Mucous membranes are moist.   Eyes:      Extraocular Movements: Extraocular movements intact. Pupils: Pupils are equal, round, and reactive to light. Cardiovascular:      Rate and Rhythm: Normal rate and regular rhythm. Pulmonary:      Effort: Pulmonary effort is normal.      Breath sounds: Normal breath sounds. Abdominal:      General: Bowel sounds are normal. There is no distension. There are no signs of injury. Palpations: Abdomen is soft. Tenderness: There is abdominal tenderness in the right lower quadrant. There is no guarding or rebound. Hernia: No hernia is present. Skin:     General: Skin is warm and dry. Findings: No rash. Neurological:      General: No focal deficit present. Mental Status: She is alert and oriented to person, place, and time. Psychiatric:         Mood and Affect: Mood normal.         Behavior: Behavior normal.          MDM       Procedures      CT ABD PELV W CONT    Result Date: 2/1/2022  No evidence of acute process. US TRANSVAGINAL    Result Date: 6/5/6065  Small follicles in the left ovary. Otherwise unremarkable. US PELV NON OBS    Result Date: 9/3/8654  Small follicles in the left ovary. Otherwise unremarkable.      Labs Reviewed   CBC WITH AUTOMATED DIFF - Abnormal; Notable for the following components:       Result Value    NEUTROPHILS 77 (*)     All other components within normal limits   METABOLIC PANEL, COMPREHENSIVE - Abnormal; Notable for the following components:    Potassium 3.2 (*)     BUN/Creatinine ratio 8 (*)     Protein, total 8.6 (*)     Globulin 4.5 (*)     A-G Ratio 0.9 (*)     All other components within normal limits   URINE CULTURE HOLD SAMPLE   LIPASE   URINALYSIS W/MICROSCOPIC   HCG QL SERUM   SAMPLES BEING HELD     Patient has been reexamined and reports relief from IV fluids and medications given. Recommend close follow-up with OB/GYN and/or gastroenterology if symptoms persist.  We will try short course of Bentyl and Zofran for symptom control. 5:23 PM  Patient's results and plan of care have been reviewed with her. Patient has verbally conveyed her understanding and agreement of her signs, symptoms, diagnosis, treatment and prognosis and additionally agrees to follow up as recommended or return to the Emergency Room should her condition change prior to follow-up. Discharge instructions have also been provided to the patient with some educational information regarding her diagnosis as well a list of reasons why she would want to return to the ER prior to her follow-up appointment should her condition change. Fauzia Jain, DENI

## 2022-02-01 NOTE — ED TRIAGE NOTES
Pt states abd pain is intermittent for the last week. Pt ambulatory, NAD noted. Pt states she has a decrease in appetite lately.

## 2022-02-01 NOTE — ED TRIAGE NOTES
abd pain x3 days, HA x2 days, n/v/d noted, loss of appetite. Pt started new birth control beginning of December. States she has breakthrough bleeding. Pt states she has OBGYN appt 2/3/22.

## 2022-02-01 NOTE — Clinical Note
[facility]  [id number] [facility] [address]  [phone]    Work/School Note    Date: 2/1/2022    To Whom It May concern:    Collin Kim was seen and treated today in the emergency room by the following provider(s):  Attending Provider: Tonya Rehman DO  Nurse Practitioner: Joyce Rocha NP. Collin Kim is excused from work/school on 2/1/2022 through 2/3/2022. She is medically clear to return to work/school on 2/4/2022.          Sincerely,          Julius Chopra NP
[facility]  [id number] [facility] [address]  [phone]    Work/School Note    Date: 2/1/2022    To Whom It May concern:    Collin Kim was seen and treated today in the emergency room by the following provider(s):  Attending Provider: Tonya Rehman DO  Nurse Practitioner: Joyce Rocha NP. Collin Kim is excused from work/school on 2/1/2022 through 2/3/2022. She is medically clear to return to work/school on 2/4/2022.          Sincerely,          Julius Chopra NP
musculoskeletal

## 2022-03-18 PROBLEM — I10 ESSENTIAL HYPERTENSION: Status: ACTIVE | Noted: 2021-04-29

## 2022-03-18 PROBLEM — E66.9 OBESITY (BMI 30-39.9): Status: ACTIVE | Noted: 2018-01-26

## 2022-03-19 PROBLEM — N93.9 EPISODE OF HEAVY VAGINAL BLEEDING: Status: ACTIVE | Noted: 2021-07-13

## 2022-03-20 PROBLEM — M79.7 FIBROMYALGIA: Status: ACTIVE | Noted: 2018-07-31

## 2022-04-07 ENCOUNTER — TELEPHONE (OUTPATIENT)
Dept: NEUROLOGY | Age: 27
End: 2022-04-07

## 2022-04-07 NOTE — TELEPHONE ENCOUNTER
Pt is requesting sooner appt due to migraines being more intense. Pt currently sched for 6/15. Please call back. Pt states it is okay to leave a detailed msg if no answer.

## 2022-04-12 NOTE — TELEPHONE ENCOUNTER
Called and LVM for the patient advising of sooner appointment 05/12/2022 at 9:00 am, requested a call back to advise if they are not able to keep that appointment.

## 2022-05-04 DIAGNOSIS — I10 ESSENTIAL HYPERTENSION: ICD-10-CM

## 2022-05-04 DIAGNOSIS — M25.473 ANKLE SWELLING, UNSPECIFIED LATERALITY: ICD-10-CM

## 2022-05-05 RX ORDER — AMLODIPINE BESYLATE 5 MG/1
5 TABLET ORAL DAILY
Qty: 30 TABLET | Refills: 0 | Status: SHIPPED | OUTPATIENT
Start: 2022-05-05

## 2022-06-02 DIAGNOSIS — I10 ESSENTIAL HYPERTENSION: ICD-10-CM

## 2022-06-02 DIAGNOSIS — M25.473 ANKLE SWELLING, UNSPECIFIED LATERALITY: ICD-10-CM

## 2022-06-02 RX ORDER — AMLODIPINE BESYLATE 5 MG/1
5 TABLET ORAL DAILY
Qty: 30 TABLET | Refills: 0 | OUTPATIENT
Start: 2022-06-02

## 2022-06-02 NOTE — TELEPHONE ENCOUNTER
Left voicemail to call office regarding medication refill. She will need to be seen in office before any medication can be refilled.

## 2022-07-22 ENCOUNTER — TELEPHONE (OUTPATIENT)
Dept: NEUROLOGY | Age: 27
End: 2022-07-22

## 2022-07-27 NOTE — TELEPHONE ENCOUNTER
Re: Willie ARECHIGA request from pharmacy, located [facility] key# [id number], submitted and awaiting update. Per clinicals, pt must try 2 triptan, only Sumatriptan has been tried.

## 2022-08-04 NOTE — TELEPHONE ENCOUNTER
Patient calling to get an appeal on her Evonne Field which was recently denied until another triptan is tried. She wanted to appeal it.    [id number]

## 2022-08-05 NOTE — TELEPHONE ENCOUNTER
Re: Miesha ARECHIGA electronic denial. See triptan use did deny PA. Will fax out appeal and show last approval with old insurance plan as attempt.

## 2022-09-07 ENCOUNTER — TELEPHONE (OUTPATIENT)
Dept: NEUROLOGY | Age: 27
End: 2022-09-07

## 2022-09-09 NOTE — TELEPHONE ENCOUNTER
Called patient. Patient stated that her insurance wants to appeal her medication Ubrelvy. Faxed to [phone].

## 2022-09-20 NOTE — TELEPHONE ENCOUNTER
Re: Yolanda Qiu    At this point created new PA request and vd PA approval. Effective 09/20/22-09/20/23    Called pharmacy to process.

## 2022-12-19 RX ORDER — UBROGEPANT 100 MG/1
TABLET ORAL
Qty: 10 TABLET | Refills: 5 | Status: SHIPPED | OUTPATIENT
Start: 2022-12-19

## 2023-01-13 ENCOUNTER — OFFICE VISIT (OUTPATIENT)
Dept: NEUROLOGY | Age: 28
End: 2023-01-13
Payer: COMMERCIAL

## 2023-01-13 VITALS
SYSTOLIC BLOOD PRESSURE: 133 MMHG | WEIGHT: 170 LBS | OXYGEN SATURATION: 100 % | RESPIRATION RATE: 16 BRPM | HEART RATE: 100 BPM | HEIGHT: 62 IN | DIASTOLIC BLOOD PRESSURE: 81 MMHG | BODY MASS INDEX: 31.28 KG/M2

## 2023-01-13 DIAGNOSIS — H53.9 VISION CHANGES: Primary | ICD-10-CM

## 2023-01-13 DIAGNOSIS — G43.009 MIGRAINE WITHOUT AURA AND WITHOUT STATUS MIGRAINOSUS, NOT INTRACTABLE: ICD-10-CM

## 2023-01-13 DIAGNOSIS — E55.9 VITAMIN D DEFICIENCY, UNSPECIFIED: ICD-10-CM

## 2023-01-13 DIAGNOSIS — R41.3 MEMORY LOSS: ICD-10-CM

## 2023-01-13 DIAGNOSIS — R47.89 WORD FINDING PROBLEM: ICD-10-CM

## 2023-01-13 RX ORDER — TOPIRAMATE 50 MG/1
50 TABLET, FILM COATED ORAL 2 TIMES DAILY
Qty: 180 TABLET | Refills: 1 | Status: SHIPPED | OUTPATIENT
Start: 2023-01-13

## 2023-01-13 RX ORDER — MELOXICAM 15 MG/1
15 TABLET ORAL DAILY
COMMUNITY

## 2023-01-13 NOTE — PROGRESS NOTES
Chief Complaint   Patient presents with    Follow-up    Headache     Patient reports word finding issues. HPI        29-year-old woman following up. I saw her for migraine headaches initially and she is on topiramate and Cymbalta. Since her last visit there have been changes. She is present today with her mother who provided some additional history. It sounds like she missed both of her last appointments with me because of COVID and since then she has had weird symptoms of fatigue and joint pain for which she has seen rheumatology placed on meloxicam.  She was having additional symptoms of vision changes, imbalance, tremor in the hands and memory loss. She has word finding difficulty and feels like she is in a fog. Mother tells me she has had 3 concussions when she was younger. She does have bipolar disease on lithium which is stable and recently had lithium reduced. She is not working because of her symptoms. She states topiramate 100 mg twice daily. 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 She has had headaches for several years now, her mother has migraines. Since December 2020 she has had increasing severe headaches such that she is gone to the ER twice for medication management. Headaches are diffuse throbbing sometimes bifrontal with nausea light sensitivity worse with movement. She notices it possibly around her cycle but she is on birth control continuously. She is having some spotting. She has maybe 1 or 2 severe breakthrough per month lasting up to 2 days each. In December 2020 she had a 6-day event. She sees psychiatry long-term for bipolar disorder on multiple medications that have not changed. She is on topiramate now for several years. No unusual numbness weakness or vision changes.   She takes Imitrex acutely but was told not to drive while using it because it makes her tired. Review of Systems   Eyes:  Negative for double vision. Neurological:  Positive for tingling, tremors, sensory change and speech change. Psychiatric/Behavioral:  Positive for memory loss. All other systems reviewed and are negative.     Past Medical History:   Diagnosis Date    Anemia     Anxiety     Arm fracture     Autism 12/27/2020    Bipolar 1 disorder (HCC)     Depression     Fibromyalgia     Hypothyroidism     IBS (irritable bowel syndrome)     Migraine      Family History   Problem Relation Age of Onset    Depression Mother     Depression Father     Heart Disease Maternal Grandmother     Depression Maternal Grandmother     Cancer Maternal Grandfather         brain cancer    Diabetes Maternal Grandfather     Hypertension Maternal Grandfather     Stroke Maternal Grandfather     Cancer Paternal Grandfather      Social History     Socioeconomic History    Marital status: SINGLE     Spouse name: Not on file    Number of children: Not on file    Years of education: Not on file    Highest education level: Not on file   Occupational History    Not on file   Tobacco Use    Smoking status: Never    Smokeless tobacco: Never   Vaping Use    Vaping Use: Never used   Substance and Sexual Activity    Alcohol use: Never    Drug use: No    Sexual activity: Yes     Partners: Female     Birth control/protection: Pill   Other Topics Concern    Not on file   Social History Narrative    Not on file     Social Determinants of Health     Financial Resource Strain: Not on file   Food Insecurity: Not on file   Transportation Needs: Not on file   Physical Activity: Not on file   Stress: Not on file   Social Connections: Not on file   Intimate Partner Violence: Not on file   Housing Stability: Not on file     Allergies   Allergen Reactions    Coconut Rash    Pcn [Penicillins] Hives    Soy Hives         Current Outpatient Medications   Medication Sig    meloxicam (MOBIC) 15 mg tablet Take 15 mg by mouth daily. topiramate (TOPAMAX) 50 mg tablet Take 1 Tablet by mouth two (2) times a day. Ubrelvy 100 mg tablet TAKE 1 TABLET BY MOUTH DAILY AS NEEDED FOR MIGRAINE.    amLODIPine (NORVASC) 5 mg tablet Take 1 Tablet by mouth daily. APPOINTMENT REQUIRED BEFORE ANY MORE REFILLS WILL BE APPROVED    ondansetron (ZOFRAN ODT) 4 mg disintegrating tablet Take 1 Tablet by mouth every eight (8) hours as needed for Nausea or Vomiting. aMILoride (MIDAMOR) 5 mg tablet Take 5 mg by mouth daily. 1/2 tab    lithium carbonate SR (LITHOBID) 300 mg CR tablet Take 600 mg by mouth two (2) times a day. 1/2 tab in morning and 2 at night    acetaminophen (TYLENOL) 325 mg tablet Take  by mouth every four (4) hours as needed for Pain.    vit B complex 100 no.2/herbs (VITAMIN B COMPLEX 100  2-HERBS PO) vitamin B complex    cholecalciferol, vitamin d3, 10 mcg (400 unit) cap Vitamin D3    levothyroxine (SYNTHROID) 50 mcg tablet Unithroid 50 mcg tablet   1 po qd    QUEtiapine (SEROQUEL) 100 mg tablet TAKE 1 AND 1/2 TABLET BY MOUTH EVERY EVENING    DULoxetine (CYMBALTA) 60 mg capsule Take 1 Cap by mouth two (2) times a day. Takes 1 cap in the am and 1 cap at night. ondansetron hcl (Zofran) 4 mg tablet Take 1 Tablet by mouth every eight (8) hours as needed for Nausea or Vomiting. (Patient not taking: Reported on 1/13/2023)    dicyclomine (BENTYL) 20 mg tablet Take 1 Tablet by mouth every six (6) hours. (Patient not taking: Reported on 1/13/2023)    conjugated estrogens (PREMARIN) 1.25 mg tablet Take 2 Tablets by mouth two (2) times a day. (Patient not taking: No sig reported)    biotin 1 mg cap biotin (Patient not taking: Reported on 1/13/2023)    fexofenadine (ALLEGRA) 180 mg tablet Allegra Allergy (Patient not taking: Reported on 1/13/2023)     No current facility-administered medications for this visit.            Neurologic Exam     Mental Status   WD/WN adult in NAD, normal grooming  VSS  A&O x 3, awake and alert following commands. Speaking clearly. PERRL, nonicteric  Face is symmetric, tongue midline  Speech is fluent and clear  No limb ataxia. No abnl movements. Moving all extemities spontaneously and symmetric  Slight tremor in the distal hands moderate frequency low amplitude  Normal gait           Visit Vitals  /81 (BP 1 Location: Left arm, BP Patient Position: Sitting, BP Cuff Size: Adult)   Pulse 100   Resp 16   Ht 5' 2\" (1.575 m)   Wt 170 lb (77.1 kg)   SpO2 100%   BMI 31.09 kg/m²       Assessment and Plan   Diagnoses and all orders for this visit:    1. Vision changes  -     MRI BRAIN W WO CONT; Future    2. Word finding problem  -     MRI BRAIN W WO CONT; Future    3. Memory loss  -     MRI BRAIN W WO CONT; Future    4. Migraine without aura and without status migrainosus, not intractable  -     topiramate (TOPAMAX) 50 mg tablet; Take 1 Tablet by mouth two (2) times a day. 5. Vitamin D deficiency, unspecified  -     VITAMIN D, 25 HYDROXY; Future    64-year-old woman with a history of migraine headaches having various new issues that are concerning. I do have high suspicion for side effect of medication from topiramate so I am going to start by reducing her medication by half. Additionally we need to check an MRI to make sure were not dealing with any central issue such as MS. She has a known history of vitamin D deficiency with a level of 20 at 1 point so I am going to recheck that. Okay to continue her other medications. I spoke to her and her mom about the plan. She will check her MyChart once the imaging is done and we will be in touch sooner if anything acutely changes and for now she will follow-up with the neuro nurse practitioner in a few months. She understands the plan. I reviewed and decided to continue the current medications. This clinical note was dictated with an electronic dictation software that can make unintentional errors.   If there are any questions, please contact me directly for clarification.       2 [facility][note 3] [address]  Diplomate ABPN

## 2023-01-13 NOTE — PROGRESS NOTES
Chief Complaint   Patient presents with    Follow-up    Headache     Patient reports word finding issues.       Visit Vitals  /81 (BP 1 Location: Left arm, BP Patient Position: Sitting, BP Cuff Size: Adult)   Pulse 100   Resp 16   Ht 5' 2\" (1.575 m)   Wt 170 lb (77.1 kg)   SpO2 100%   BMI 31.09 kg/m²

## 2023-01-14 LAB — 25(OH)D3+25(OH)D2 SERPL-MCNC: 32.1 NG/ML (ref 30–100)

## 2023-01-16 NOTE — TELEPHONE ENCOUNTER
Inbox message sent to Geo Conley to schedule HFU appointment with Dr. Anish Evans and please call pt to verify date and time. Please call patient:    I see that according to chart she is taking Amiloride. Spironolactone is similar type of med. She should not take both med together. Why did she start Amiloride and who started?

## 2023-01-19 ENCOUNTER — HOSPITAL ENCOUNTER (OUTPATIENT)
Dept: MRI IMAGING | Age: 28
Discharge: HOME | End: 2023-01-19
Attending: PSYCHIATRY & NEUROLOGY
Payer: COMMERCIAL

## 2023-01-19 VITALS — BODY MASS INDEX: 31.09 KG/M2 | WEIGHT: 170 LBS

## 2023-01-19 DIAGNOSIS — H53.9 VISION CHANGES: ICD-10-CM

## 2023-01-19 DIAGNOSIS — R47.89 WORD FINDING PROBLEM: ICD-10-CM

## 2023-01-19 DIAGNOSIS — R41.3 MEMORY LOSS: ICD-10-CM

## 2023-01-19 PROCEDURE — 74011250636 HC RX REV CODE- 250/636: Performed by: PSYCHIATRY & NEUROLOGY

## 2023-01-19 PROCEDURE — A9576 INJ PROHANCE MULTIPACK: HCPCS | Performed by: PSYCHIATRY & NEUROLOGY

## 2023-01-19 PROCEDURE — 70553 MRI BRAIN STEM W/O & W/DYE: CPT

## 2023-01-19 RX ADMIN — GADOTERIDOL 15 ML: 279.3 INJECTION, SOLUTION INTRAVENOUS at 15:00

## 2023-01-20 ENCOUNTER — TELEPHONE (OUTPATIENT)
Dept: NEUROLOGY | Age: 28
End: 2023-01-20

## 2023-01-23 NOTE — PROGRESS NOTES
MRI brain looks great. MRI to make sure there was not anything unusual like a tumor or multiple sclerosis or stroke.   It is very normal.

## 2023-03-06 ENCOUNTER — TRANSCRIBE ORDER (OUTPATIENT)
Dept: SCHEDULING | Age: 28
End: 2023-03-06

## 2023-03-06 DIAGNOSIS — N17.9 ACUTE RENAL FAILURE SYNDROME (HCC): ICD-10-CM

## 2023-03-06 DIAGNOSIS — E03.9 HYPOTHYROIDISM: ICD-10-CM

## 2023-03-06 DIAGNOSIS — F31.9 BIPOLAR I DISORDER (HCC): ICD-10-CM

## 2023-03-06 DIAGNOSIS — I10 BENIGN ESSENTIAL HYPERTENSION: Primary | ICD-10-CM

## 2023-03-10 ENCOUNTER — HOSPITAL ENCOUNTER (OUTPATIENT)
Dept: ULTRASOUND IMAGING | Age: 28
Discharge: HOME | End: 2023-03-10
Attending: INTERNAL MEDICINE
Payer: COMMERCIAL

## 2023-03-10 DIAGNOSIS — E03.9 HYPOTHYROIDISM: ICD-10-CM

## 2023-03-10 DIAGNOSIS — F31.9 BIPOLAR I DISORDER (HCC): ICD-10-CM

## 2023-03-10 DIAGNOSIS — N17.9 ACUTE RENAL FAILURE SYNDROME (HCC): ICD-10-CM

## 2023-03-10 DIAGNOSIS — I10 BENIGN ESSENTIAL HYPERTENSION: ICD-10-CM

## 2023-03-10 PROCEDURE — 76770 US EXAM ABDO BACK WALL COMP: CPT

## 2023-03-23 NOTE — PROGRESS NOTES
Chief Complaint   Patient presents with    Follow-up    Headache     Patient reports fatigue, confusion, dizziness       HPI    Mrs Arcelia Almonte is a 25year old female here for follow up. She is a new patient for me. She was last seen by Dr Epifanio Dancer on 1/13/23 for migraines. She was taking Topamax 100 mg that her mental health provider was giving her but was having side effects of word finding problems and balance issues along with tremor in hands, so it was decreased to 50 mg at last visit. She is also on Cymbalta. MRI brain on 1/19 looked normal. She is here today with her mom. She is still reporting the same symptoms as before; dizziness, confusion, word finding problems. She is still on the 100 mg BID dose of Topamax. She had some health/kidney problems since last visit that she was dealing with and unable to switch to the recommended dosage. Her mom is worried the Topamax is causing this side effects. She is also wanting a work-up for POTS. Her migraines are doing great on the Cheri as needed. She rarely has to take it. Denies any worsening or new migrainous symptoms. 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 She has had headaches for several years now, her mother has migraines. Since December 2020 she has had increasing severe headaches such that she is gone to the ER twice for medication management. Headaches are diffuse throbbing sometimes bifrontal with nausea light sensitivity worse with movement. She notices it possibly around her cycle but she is on birth control continuously. She is having some spotting. She has maybe 1 or 2 severe breakthrough per month lasting up to 2 days each. In December 2020 she had a 6-day event. She sees psychiatry long-term for bipolar disorder on multiple medications that have not changed. She is on topiramate now for several years. No unusual numbness weakness or vision changes. She takes Imitrex acutely but was told not to drive while using it because it makes her tired. Review of Systems   Eyes:  Negative for double vision. Neurological:  Positive for dizziness. Negative for headaches. Psychiatric/Behavioral:  Positive for memory loss.       Past Medical History:   Diagnosis Date    Anemia     Anxiety     Arm fracture     Autism 12/27/2020    Bipolar 1 disorder (HCC)     Depression     Fibromyalgia     Hypothyroidism     IBS (irritable bowel syndrome)     Migraine      Family History   Problem Relation Age of Onset    Depression Mother     Depression Father     Heart Disease Maternal Grandmother     Depression Maternal Grandmother     Cancer Maternal Grandfather         brain cancer    Diabetes Maternal Grandfather     Hypertension Maternal Grandfather     Stroke Maternal Grandfather     Cancer Paternal Grandfather      Social History     Socioeconomic History    Marital status: SINGLE     Spouse name: Not on file    Number of children: Not on file    Years of education: Not on file    Highest education level: Not on file   Occupational History    Not on file   Tobacco Use    Smoking status: Never    Smokeless tobacco: Never   Vaping Use    Vaping Use: Never used   Substance and Sexual Activity    Alcohol use: Never    Drug use: No    Sexual activity: Yes     Partners: Female     Birth control/protection: Pill   Other Topics Concern    Not on file   Social History Narrative    Not on file     Social Determinants of Health     Financial Resource Strain: Not on file   Food Insecurity: Not on file   Transportation Needs: Not on file   Physical Activity: Not on file   Stress: Not on file   Social Connections: Not on file   Intimate Partner Violence: Not on file   Housing Stability: Not on file     Allergies   Allergen Reactions    Coconut Rash    Pcn [Penicillins] Hives    Soy Hives         Current Outpatient Medications   Medication Sig sulindac (CLINORIL) 150 mg tablet Take 25 mg by mouth daily. naltrexone (DEPADE) 50 mg tablet Take 4.5 mg by mouth daily. docusate sodium (COLACE) 50 mg capsule Take 50 mg by mouth two (2) times a day. ubrogepant Brianna Breaker) 100 mg tablet TAKE 1 TABLET BY MOUTH DAILY AS NEEDED FOR MIGRAINE.    topiramate (TOPAMAX) 50 mg tablet Take 1 Tablet by mouth two (2) times a day. (Patient taking differently: Take 100 mg by mouth two (2) times a day.)    amLODIPine (NORVASC) 5 mg tablet Take 1 Tablet by mouth daily. APPOINTMENT REQUIRED BEFORE ANY MORE REFILLS WILL BE APPROVED    ondansetron (ZOFRAN ODT) 4 mg disintegrating tablet Take 1 Tablet by mouth every eight (8) hours as needed for Nausea or Vomiting. aMILoride (MIDAMOR) 5 mg tablet Take 5 mg by mouth daily. 1/2 tab    lithium carbonate SR (LITHOBID) 300 mg CR tablet Take  by mouth daily. 1/2 tab in morning and 2 at night    acetaminophen (TYLENOL) 325 mg tablet Take  by mouth every four (4) hours as needed for Pain.    vit B complex 100 no.2/herbs (VITAMIN B COMPLEX 100  2-HERBS PO) vitamin B complex    cholecalciferol, vitamin d3, 10 mcg (400 unit) cap Vitamin D3    levothyroxine (SYNTHROID) 50 mcg tablet Unithroid 50 mcg tablet   1 po qd    QUEtiapine (SEROQUEL) 100 mg tablet TAKE 1 AND 1/2 TABLET BY MOUTH EVERY EVENING (Patient taking differently: Take 250 mg by mouth daily. TAKE 1 AND 1/2 TABLET BY MOUTH EVERY EVENING)    DULoxetine (CYMBALTA) 60 mg capsule Take 1 Cap by mouth two (2) times a day. Takes 1 cap in the am and 1 cap at night.    meloxicam (MOBIC) 15 mg tablet Take 15 mg by mouth daily. (Patient not taking: Reported on 3/24/2023)     No current facility-administered medications for this visit. Neurologic Exam     Mental Status   Level of consciousness: drowsy  Drowsy today when she isn't taking. Cranial Nerves   Cranial nerves II through XII intact. Motor Exam     Strength   Strength 5/5 throughout.      Sensory Exam Light touch normal.     Gait, Coordination, and Reflexes     Gait  Gait: normal    Visit Vitals  /69 (BP 1 Location: Left arm, BP Patient Position: Sitting, BP Cuff Size: Adult)   Pulse 90   Resp 17   Ht 5' 2\" (1.575 m)   Wt 170 lb (77.1 kg)   SpO2 98%   BMI 31.09 kg/m²         CT Results (maximum last 3): Results from Hospital Encounter encounter on 02/01/22    CT ABD PELV W CONT    Narrative  EXAM: CT ABD PELV W CONT    INDICATION: RLQ pain    COMPARISON: None    CONTRAST: 100 mL of Isovue-370. ORAL CONTRAST: None    TECHNIQUE:  Following the uneventful intravenous administration of contrast, thin axial  images were obtained through the abdomen and pelvis. Coronal and sagittal  reconstructions were generated. CT dose reduction was achieved through use of a  standardized protocol tailored for this examination and automatic exposure  control for dose modulation. FINDINGS:  LOWER THORAX: No significant abnormality in the incidentally imaged lower chest.  LIVER: No mass. The liver is mildly hypodense related to mild hepatic steatosis. BILIARY TREE: Gallbladder is within normal limits. CBD is not dilated. SPLEEN: within normal limits. PANCREAS: No mass or ductal dilatation. ADRENALS: Unremarkable. KIDNEYS: No mass, calculus, or hydronephrosis. STOMACH: Unremarkable. SMALL BOWEL: No dilatation or wall thickening. COLON: No dilatation or wall thickening. APPENDIX: Unremarkable  PERITONEUM: No ascites or pneumoperitoneum. RETROPERITONEUM: No lymphadenopathy or aortic aneurysm. REPRODUCTIVE ORGANS: Unremarkable  URINARY BLADDER: No mass or calculus. BONES: No destructive bone lesion. ABDOMINAL WALL: No mass or hernia. ADDITIONAL COMMENTS: N/A    Impression  No evidence of acute process. MRI Results (maximum last 3):   Results from [place] encounter on 01/19/23    MRI BRAIN W WO CONT    Narrative  EXAM:  MRI BRAIN W WO CONT    INDICATION:    Vision changes, word finding problems, memory loss. COMPARISON:  CT head 1/31/2008. CONTRAST: 15 ml ProHance. TECHNIQUE:  Multiplanar multisequence acquisition without and with contrast of the brain. FINDINGS:  The ventricles are normal in size and position. The brain parenchyma has normal  signal characteristics. There is no acute infarct, hemorrhage, extra-axial fluid  collection, or mass effect. There is no cerebellar tonsillar herniation. Expected arterial flow-voids are present. No evidence of abnormal enhancement. The paranasal sinuses, mastoid air cells, and middle ears are clear. The orbital  contents are within normal limits. No significant osseous or scalp lesions are  identified. Impression  1. Normal brain MRI. Follow-up and Dispositions    Return in about 6 months (around 9/24/2023). Assessment and Plan   Diagnoses and all orders for this visit:    1. Migraine without aura and without status migrainosus, not intractable  -     ubrogepant (Ubrelvy) 100 mg tablet; TAKE 1 TABLET BY MOUTH DAILY AS NEEDED FOR MIGRAINE. 2. Side effect of medication    3. Dizziness on standing  -     REFERRAL TO NEUROLOGY    25year old female with multiple medical conditions. We see her for migraines that are well controlled using just Ubrelvy as needed. Continue to use this. The symptoms that she is describing can be from the Topamax. She needs to follow up with her mental health provider who orders this for her. I don't feel comfortable stopping it due to the number of meds that she is on for her bi-polar. I do recommend that she try decreasing the dose and see how she does. I can refer for some ANS testing at our other location. She should also check with her PCP who can order some tests for POTS as well. I will see her back in office in 6 months.      I spent 45 minutes of time today reviewing the medical record and notes, imaging, examining the patient, and face-to-face time, patient/family teaching and medication side effects, and time spent completing documentation.         TORIE Beyer

## 2023-03-24 ENCOUNTER — OFFICE VISIT (OUTPATIENT)
Dept: NEUROLOGY | Age: 28
End: 2023-03-24

## 2023-03-24 VITALS
DIASTOLIC BLOOD PRESSURE: 69 MMHG | SYSTOLIC BLOOD PRESSURE: 125 MMHG | BODY MASS INDEX: 31.28 KG/M2 | OXYGEN SATURATION: 98 % | HEART RATE: 90 BPM | WEIGHT: 170 LBS | HEIGHT: 62 IN | RESPIRATION RATE: 17 BRPM

## 2023-03-24 DIAGNOSIS — T88.7XXA SIDE EFFECT OF MEDICATION: ICD-10-CM

## 2023-03-24 DIAGNOSIS — R42 DIZZINESS ON STANDING: ICD-10-CM

## 2023-03-24 DIAGNOSIS — G43.009 MIGRAINE WITHOUT AURA AND WITHOUT STATUS MIGRAINOSUS, NOT INTRACTABLE: Primary | ICD-10-CM

## 2023-03-24 RX ORDER — SULINDAC 150 MG/1
25 TABLET ORAL DAILY
COMMUNITY

## 2023-03-24 RX ORDER — NALTREXONE HYDROCHLORIDE 50 MG/1
4.5 TABLET, FILM COATED ORAL DAILY
COMMUNITY

## 2023-03-24 NOTE — PROGRESS NOTES
Chief Complaint   Patient presents with    Follow-up    Headache     Patient reports fatigue, confusion, dizziness    Visit Vitals  /69 (BP 1 Location: Left arm, BP Patient Position: Sitting, BP Cuff Size: Adult)   Pulse 90   Resp 17   Ht 5' 2\" (1.575 m)   Wt 170 lb (77.1 kg)   SpO2 98%   BMI 31.09 kg/m²

## 2023-04-05 ENCOUNTER — TELEPHONE (OUTPATIENT)
Dept: NEUROLOGY | Age: 28
End: 2023-04-05

## 2023-04-05 NOTE — TELEPHONE ENCOUNTER
Pt would like to discuss how it would impact ans results because she is on pshyciatric medication and her therapist has advised against discontinuing medication. Please call to advise.

## 2023-09-07 ENCOUNTER — TELEPHONE (OUTPATIENT)
Age: 28
End: 2023-09-07

## 2023-09-07 NOTE — TELEPHONE ENCOUNTER
Patient requesting a call to discuss a bill she received from her recent Tilt Table Test.    She has spoken with the insurance company and the will be reprocessing the bill for the correct amount. She just wanted to let the office know .     The insurance ref number is - [id number]

## 2023-09-25 ENCOUNTER — OFFICE VISIT (OUTPATIENT)
Age: 28
End: 2023-09-25
Payer: COMMERCIAL

## 2023-09-25 VITALS
RESPIRATION RATE: 16 BRPM | OXYGEN SATURATION: 98 % | SYSTOLIC BLOOD PRESSURE: 126 MMHG | WEIGHT: 170 LBS | BODY MASS INDEX: 31.28 KG/M2 | HEIGHT: 62 IN | DIASTOLIC BLOOD PRESSURE: 73 MMHG | HEART RATE: 93 BPM

## 2023-09-25 DIAGNOSIS — G43.009 MIGRAINE WITHOUT AURA, NOT INTRACTABLE, WITHOUT STATUS MIGRAINOSUS: Primary | ICD-10-CM

## 2023-09-25 DIAGNOSIS — G43.829 MENSTRUAL MIGRAINE WITHOUT STATUS MIGRAINOSUS, NOT INTRACTABLE: ICD-10-CM

## 2023-09-25 PROCEDURE — 3074F SYST BP LT 130 MM HG: CPT

## 2023-09-25 PROCEDURE — 99215 OFFICE O/P EST HI 40 MIN: CPT

## 2023-09-25 PROCEDURE — 3078F DIAST BP <80 MM HG: CPT

## 2023-09-25 RX ORDER — UBROGEPANT 100 MG/1
TABLET ORAL
Qty: 16 TABLET | Refills: 5 | Status: SHIPPED | OUTPATIENT
Start: 2023-09-25

## 2023-09-25 RX ORDER — SODIUM BICARBONATE 325 MG/1
325 TABLET ORAL 3 TIMES DAILY
COMMUNITY

## 2023-09-25 RX ORDER — CHLORAL HYDRATE 500 MG
1 CAPSULE ORAL DAILY
COMMUNITY

## 2023-09-25 RX ORDER — IRON,CARBONYL/ASCORBIC ACID 65MG-125MG
1 TABLET, DELAYED RELEASE (ENTERIC COATED) ORAL DAILY
COMMUNITY

## 2023-09-25 ASSESSMENT — PATIENT HEALTH QUESTIONNAIRE - PHQ9
1. LITTLE INTEREST OR PLEASURE IN DOING THINGS: 0
2. FEELING DOWN, DEPRESSED OR HOPELESS: 0
SUM OF ALL RESPONSES TO PHQ9 QUESTIONS 1 & 2: 0
SUM OF ALL RESPONSES TO PHQ QUESTIONS 1-9: 0

## 2023-09-25 NOTE — PROGRESS NOTES
Chief Complaint   Patient presents with    Follow-up    Migraine      Vitals:    09/25/23 1306   BP: 126/73   Pulse: 93   Resp: 16   SpO2: 98%

## 2023-09-25 NOTE — PROGRESS NOTES
Chief Complaint   Patient presents with    Follow-up    Migraine       HPI    Mrs Mihir Rubio is a 25year old female here for follow up. I saw her last on 3/24/23 for migraines. She was having side effects from Topamax last visit and wanted to stop it, but we were not prescribing it for her. She was using Ubrelvy as needed for her pain with good results. I did sent her for ANS testing for concern of POTS. She saw Dr Ellis Treviño on 4/13/23 for testing which was abnormal but thought it was due to medication effects. Cardiac portion was normal. She is here today and reporting that she is doing a lot better. Her migraines are well under control. She has not had a migraine since last visit. She will get menstrual headaches around the time of her cycle now. She stopped her OBC and noticed it more now. She is now seeing Nephrology due to some SHANA. No more side effects from the TPX. Dizziness is better as well. Naresh Meds:Topiramate, quetiapine, duloxetine, gabapentin, buspirone, Imitrex, Lillia Frisk      BACKGROUND  Mrs Mihir Rubio is a 25year old female here for follow up. She is a new patient for me. She was last seen by Dr Kadi French on 1/13/23 for migraines. She was taking Topamax 100 mg that her mental health provider was giving her but was having side effects of word finding problems and balance issues along with tremor in hands, so it was decreased to 50 mg at last visit. She is also on Cymbalta. MRI brain on 1/19 looked normal. She is here today with her mom. She is still reporting the same symptoms as before; dizziness, confusion, word finding problems. She is still on the 100 mg BID dose of Topamax. She had some health/kidney problems since last visit that she was dealing with and unable to switch to the recommended dosage. Her mom is worried the Topamax is causing this side effects. She is also wanting a work-up for POTS. Her migraines are doing great on the Lillia Frisk as needed. She rarely has to take it.  Denies any

## 2023-09-26 ENCOUNTER — TELEPHONE (OUTPATIENT)
Age: 28
End: 2023-09-26

## 2023-09-26 NOTE — TELEPHONE ENCOUNTER
Re: Celi Marrero    leobardo PA request in [facility] key# [id number], submitted and awaiting outcome.

## 2023-11-01 ENCOUNTER — TELEPHONE (OUTPATIENT)
Age: 28
End: 2023-11-01

## 2023-11-01 NOTE — TELEPHONE ENCOUNTER
Re: Hair ROSSI in [facility], key# [id number], approval [facility], auth# :[id number] effective 09/30/23-10/29/24, fyi to nurse.

## 2024-04-09 ENCOUNTER — OFFICE VISIT (OUTPATIENT)
Age: 29
End: 2024-04-09
Payer: COMMERCIAL

## 2024-04-09 VITALS
HEIGHT: 62 IN | WEIGHT: 170 LBS | HEART RATE: 91 BPM | SYSTOLIC BLOOD PRESSURE: 124 MMHG | BODY MASS INDEX: 31.28 KG/M2 | OXYGEN SATURATION: 98 % | DIASTOLIC BLOOD PRESSURE: 74 MMHG | RESPIRATION RATE: 16 BRPM

## 2024-04-09 DIAGNOSIS — G43.829 MENSTRUAL MIGRAINE WITHOUT STATUS MIGRAINOSUS, NOT INTRACTABLE: ICD-10-CM

## 2024-04-09 DIAGNOSIS — G43.009 MIGRAINE WITHOUT AURA, NOT INTRACTABLE, WITHOUT STATUS MIGRAINOSUS: ICD-10-CM

## 2024-04-09 PROCEDURE — 3078F DIAST BP <80 MM HG: CPT

## 2024-04-09 PROCEDURE — 3074F SYST BP LT 130 MM HG: CPT

## 2024-04-09 PROCEDURE — 99215 OFFICE O/P EST HI 40 MIN: CPT

## 2024-04-09 RX ORDER — EZETIMIBE 10 MG/1
10 TABLET ORAL DAILY
COMMUNITY

## 2024-04-09 RX ORDER — UBROGEPANT 100 MG/1
TABLET ORAL
Qty: 16 TABLET | Refills: 5 | Status: SHIPPED | OUTPATIENT
Start: 2024-04-09

## 2024-04-09 RX ORDER — TOPIRAMATE 50 MG/1
100 TABLET, FILM COATED ORAL 2 TIMES DAILY
Qty: 120 TABLET | Refills: 5 | Status: SHIPPED | OUTPATIENT
Start: 2024-04-09

## 2024-04-09 ASSESSMENT — PATIENT HEALTH QUESTIONNAIRE - PHQ9
1. LITTLE INTEREST OR PLEASURE IN DOING THINGS: NOT AT ALL
2. FEELING DOWN, DEPRESSED OR HOPELESS: NOT AT ALL
SUM OF ALL RESPONSES TO PHQ QUESTIONS 1-9: 0
SUM OF ALL RESPONSES TO PHQ9 QUESTIONS 1 & 2: 0

## 2024-04-09 ASSESSMENT — ENCOUNTER SYMPTOMS: PHOTOPHOBIA: 1

## 2024-04-09 NOTE — PROGRESS NOTES
Chief Complaint   Patient presents with    Follow-up    Migraine       HPI    Mrs Samuels is a 29 year old female here for follow up. I saw her last on 9/25/23 for migraines. She uses TPM and Ubrelvy as needed. She is reporting a slight increase in her migraines the last few months. She gets about 2-3 per month. Her last one was last weekend and was a sudden onset at work and severe. She had one prior to that one in the morning that was smaller. She took Ubrelvy which helped. This one lasted about an hour. She did report some visual disturbances with stars and tunnel vision.       Naresh Meds:Topiramate, quetiapine, duloxetine, gabapentin, buspirone, Imitrex, Ubrelvy     LAST VISIT  Mrs Samuels is a 28 year old female here for follow up. I saw her last on 3/24/23 for migraines. She was having side effects from Topamax last visit and wanted to stop it, but we were not prescribing it for her. She was using Ubrelvy as needed for her pain with good results. I did sent her for ANS testing for concern of POTS. She saw Dr Blum on 4/13/23 for testing which was abnormal but thought it was due to medication effects. Cardiac portion was normal. She is here today and reporting that she is doing a lot better. Her migraines are well under control. She has not had a migraine since last visit. She will get menstrual headaches around the time of her cycle now. She stopped her OBC and noticed it more now. She is now seeing Nephrology due to some SHANA. No more side effects from the TPX. Dizziness is better as well.     Review of Systems   Eyes:  Positive for photophobia and visual disturbance.   Neurological:  Positive for dizziness and headaches.         Past Medical History:   Diagnosis Date    Anemia     Anxiety     Arm fracture     Autism 12/27/2020    Bipolar 1 disorder (HCC)     Depression     Fibromyalgia     Hypothyroidism     IBS (irritable bowel syndrome)     Migraine      Family History   Problem Relation Age of Onset

## 2024-04-15 ENCOUNTER — TELEPHONE (OUTPATIENT)
Age: 29
End: 2024-04-15

## 2024-04-15 DIAGNOSIS — G43.009 MIGRAINE WITHOUT AURA, NOT INTRACTABLE, WITHOUT STATUS MIGRAINOSUS: Primary | ICD-10-CM

## 2024-04-15 NOTE — TELEPHONE ENCOUNTER
Patient is requesting Rx for samples migraine nasal spray (Zavzprep) you gave her, she said it worked great!        [facility]  [phone]

## 2024-04-29 ENCOUNTER — TELEPHONE (OUTPATIENT)
Age: 29
End: 2024-04-29

## 2024-04-29 NOTE — TELEPHONE ENCOUNTER
RE: Zavzpret    Key: [id number]  PA Case ID: [id number] RX # [id number]    Denied due to lack of triptan tried/failed or contraindicated. I see patient has tried Sumatriptan. She will need to try & fail before they will cover Zavzpret.    Denial scanned to chart    Fyi to nurse.

## 2024-05-01 RX ORDER — RIZATRIPTAN BENZOATE 10 MG/1
10 TABLET ORAL
Qty: 9 TABLET | Refills: 1 | Status: SHIPPED | OUTPATIENT
Start: 2024-05-01 | End: 2024-05-01

## 2024-05-01 NOTE — TELEPHONE ENCOUNTER
Call placed to patient.  LVM on identified line advising insurance denied Zavzpret.  Need to try and fail 2 triptans.  Advised the NP Elmer was putting in a script for Maxalt to be used as a rescue.

## 2025-02-10 ENCOUNTER — OFFICE VISIT (OUTPATIENT)
Age: 30
End: 2025-02-10
Payer: COMMERCIAL

## 2025-02-10 VITALS
RESPIRATION RATE: 17 BRPM | DIASTOLIC BLOOD PRESSURE: 74 MMHG | BODY MASS INDEX: 31.28 KG/M2 | HEIGHT: 62 IN | WEIGHT: 170 LBS | OXYGEN SATURATION: 98 % | SYSTOLIC BLOOD PRESSURE: 124 MMHG | HEART RATE: 93 BPM

## 2025-02-10 DIAGNOSIS — G43.009 MIGRAINE WITHOUT AURA, NOT INTRACTABLE, WITHOUT STATUS MIGRAINOSUS: ICD-10-CM

## 2025-02-10 DIAGNOSIS — G43.829 MENSTRUAL MIGRAINE WITHOUT STATUS MIGRAINOSUS, NOT INTRACTABLE: ICD-10-CM

## 2025-02-10 PROCEDURE — 99214 OFFICE O/P EST MOD 30 MIN: CPT

## 2025-02-10 PROCEDURE — 3078F DIAST BP <80 MM HG: CPT

## 2025-02-10 PROCEDURE — 3074F SYST BP LT 130 MM HG: CPT

## 2025-02-10 RX ORDER — TOPIRAMATE 50 MG/1
100 TABLET, FILM COATED ORAL 2 TIMES DAILY
Qty: 120 TABLET | Refills: 5 | Status: SHIPPED | OUTPATIENT
Start: 2025-02-10

## 2025-02-10 RX ORDER — NICOTINE 14MG/24HR
PATCH, TRANSDERMAL 24 HOURS TRANSDERMAL
COMMUNITY

## 2025-02-10 RX ORDER — UBROGEPANT 100 MG/1
TABLET ORAL
Qty: 16 TABLET | Refills: 5 | Status: ACTIVE | OUTPATIENT
Start: 2025-02-10

## 2025-02-10 ASSESSMENT — PATIENT HEALTH QUESTIONNAIRE - PHQ9
SUM OF ALL RESPONSES TO PHQ QUESTIONS 1-9: 0
SUM OF ALL RESPONSES TO PHQ QUESTIONS 1-9: 0
2. FEELING DOWN, DEPRESSED OR HOPELESS: NOT AT ALL
SUM OF ALL RESPONSES TO PHQ QUESTIONS 1-9: 0
1. LITTLE INTEREST OR PLEASURE IN DOING THINGS: NOT AT ALL
SUM OF ALL RESPONSES TO PHQ QUESTIONS 1-9: 0
SUM OF ALL RESPONSES TO PHQ9 QUESTIONS 1 & 2: 0

## 2025-02-10 ASSESSMENT — ENCOUNTER SYMPTOMS: PHOTOPHOBIA: 0

## 2025-02-10 NOTE — PROGRESS NOTES
Chief Complaint   Patient presents with    Follow-up    Migraine       HPI    Mrs Samuels is a 30 year old female here for FU. Seen last on 4/9/24 for migraines. She is on Topamax 100 mg BID with Ubrelvy as needed. She is reporting today that her migraines are doing great. No side effects with the topamax. She rarely needs to use the ubrelvy, but when she does it works well.           Naresh Meds:Topiramate, quetiapine, duloxetine, gabapentin, buspirone, Imitrex, Ubrelvy     Last visit  Mrs Samuels is a 29 year old female here for follow up. I saw her last on 9/25/23 for migraines. She uses TPM and Ubrelvy as needed. She is reporting a slight increase in her migraines the last few months. She gets about 2-3 per month. Her last one was last weekend and was a sudden onset at work and severe. She had one prior to that one in the morning that was smaller. She took Ubrelvy which helped. This one lasted about an hour. She did report some visual disturbances with stars and tunnel vision.     Review of Systems   Eyes:  Negative for photophobia and visual disturbance.   Neurological:  Positive for headaches.         Past Medical History:   Diagnosis Date    Anemia     Anxiety     Arm fracture     Autism 12/27/2020    Bipolar 1 disorder (HCC)     Depression     Fibromyalgia     Hypothyroidism     IBS (irritable bowel syndrome)     Migraine      Family History   Problem Relation Age of Onset    Depression Father     Stroke Maternal Grandfather     Hypertension Maternal Grandfather     Diabetes Maternal Grandfather     Cancer Maternal Grandfather         brain cancer    Depression Mother     Cancer Paternal Grandfather     Heart Disease Maternal Grandmother     Depression Maternal Grandmother      Social History     Socioeconomic History    Marital status: Single     Spouse name: Not on file    Number of children: Not on file    Years of education: Not on file    Highest education level: Not on file   Occupational History    Not

## 2025-05-16 ENCOUNTER — TELEPHONE (OUTPATIENT)
Age: 30
End: 2025-05-16

## 2025-05-16 NOTE — TELEPHONE ENCOUNTER
Libby representative called in along with the patient to state a prior authorization is needed for the patient's UBRELVY.    This can be completed by calling [phone].    Patient would like to pick this up at the [facility] on file.    Patient also as of April this year has [employer].    PSR has entered the patient's new insurance and removed the old one.